# Patient Record
Sex: FEMALE | Race: BLACK OR AFRICAN AMERICAN | URBAN - METROPOLITAN AREA
[De-identification: names, ages, dates, MRNs, and addresses within clinical notes are randomized per-mention and may not be internally consistent; named-entity substitution may affect disease eponyms.]

---

## 2017-11-15 RX ORDER — ALBUTEROL SULFATE 90 UG/1
2 AEROSOL, METERED RESPIRATORY (INHALATION) EVERY 6 HOURS PRN
Qty: 1 INHALER | Refills: 2 | Status: SHIPPED | OUTPATIENT
Start: 2017-11-15 | End: 2022-11-02

## 2021-05-03 ENCOUNTER — OFFICE VISIT (OUTPATIENT)
Dept: PRIMARY CARE CLINIC | Age: 66
End: 2021-05-03
Payer: MEDICARE

## 2021-05-03 VITALS
HEIGHT: 64 IN | DIASTOLIC BLOOD PRESSURE: 63 MMHG | WEIGHT: 143 LBS | OXYGEN SATURATION: 100 % | SYSTOLIC BLOOD PRESSURE: 96 MMHG | BODY MASS INDEX: 24.41 KG/M2 | TEMPERATURE: 97.8 F | HEART RATE: 63 BPM

## 2021-05-03 DIAGNOSIS — J30.2 SEASONAL ALLERGIES: ICD-10-CM

## 2021-05-03 DIAGNOSIS — Z23 NEED FOR DIPHTHERIA-TETANUS-PERTUSSIS (TDAP) VACCINE: ICD-10-CM

## 2021-05-03 DIAGNOSIS — J45.20 MILD INTERMITTENT ASTHMA WITHOUT COMPLICATION: Primary | ICD-10-CM

## 2021-05-03 DIAGNOSIS — Z23 NEED FOR 23-POLYVALENT PNEUMOCOCCAL POLYSACCHARIDE VACCINE: ICD-10-CM

## 2021-05-03 DIAGNOSIS — G20 PARKINSON DISEASE (HCC): ICD-10-CM

## 2021-05-03 DIAGNOSIS — Z87.440 HISTORY OF RECURRENT UTIS: ICD-10-CM

## 2021-05-03 PROCEDURE — 4040F PNEUMOC VAC/ADMIN/RCVD: CPT | Performed by: FAMILY MEDICINE

## 2021-05-03 PROCEDURE — 90471 IMMUNIZATION ADMIN: CPT | Performed by: FAMILY MEDICINE

## 2021-05-03 PROCEDURE — G8420 CALC BMI NORM PARAMETERS: HCPCS | Performed by: FAMILY MEDICINE

## 2021-05-03 PROCEDURE — 99203 OFFICE O/P NEW LOW 30 MIN: CPT | Performed by: FAMILY MEDICINE

## 2021-05-03 PROCEDURE — G8399 PT W/DXA RESULTS DOCUMENT: HCPCS | Performed by: FAMILY MEDICINE

## 2021-05-03 PROCEDURE — 1090F PRES/ABSN URINE INCON ASSESS: CPT | Performed by: FAMILY MEDICINE

## 2021-05-03 PROCEDURE — 3017F COLORECTAL CA SCREEN DOC REV: CPT | Performed by: FAMILY MEDICINE

## 2021-05-03 PROCEDURE — 90715 TDAP VACCINE 7 YRS/> IM: CPT | Performed by: FAMILY MEDICINE

## 2021-05-03 PROCEDURE — 1123F ACP DISCUSS/DSCN MKR DOCD: CPT | Performed by: FAMILY MEDICINE

## 2021-05-03 PROCEDURE — G0009 ADMIN PNEUMOCOCCAL VACCINE: HCPCS | Performed by: FAMILY MEDICINE

## 2021-05-03 PROCEDURE — 1036F TOBACCO NON-USER: CPT | Performed by: FAMILY MEDICINE

## 2021-05-03 PROCEDURE — G8427 DOCREV CUR MEDS BY ELIG CLIN: HCPCS | Performed by: FAMILY MEDICINE

## 2021-05-03 PROCEDURE — 90732 PPSV23 VACC 2 YRS+ SUBQ/IM: CPT | Performed by: FAMILY MEDICINE

## 2021-05-03 RX ORDER — LORATADINE 10 MG/1
10 TABLET ORAL DAILY
COMMUNITY

## 2021-05-03 RX ORDER — ASCORBIC ACID 500 MG
500 TABLET ORAL DAILY
COMMUNITY

## 2021-05-03 SDOH — ECONOMIC STABILITY: FOOD INSECURITY: WITHIN THE PAST 12 MONTHS, YOU WORRIED THAT YOUR FOOD WOULD RUN OUT BEFORE YOU GOT MONEY TO BUY MORE.: NOT ASKED

## 2021-05-03 SDOH — ECONOMIC STABILITY: FOOD INSECURITY: WITHIN THE PAST 12 MONTHS, THE FOOD YOU BOUGHT JUST DIDN'T LAST AND YOU DIDN'T HAVE MONEY TO GET MORE.: NOT ASKED

## 2021-05-03 ASSESSMENT — ENCOUNTER SYMPTOMS
NAUSEA: 0
ABDOMINAL PAIN: 0
SORE THROAT: 0
COUGH: 0
SHORTNESS OF BREATH: 0

## 2021-05-03 ASSESSMENT — PATIENT HEALTH QUESTIONNAIRE - PHQ9
1. LITTLE INTEREST OR PLEASURE IN DOING THINGS: 0
SUM OF ALL RESPONSES TO PHQ9 QUESTIONS 1 & 2: 0

## 2021-05-03 NOTE — PROGRESS NOTES
[address] [facility] [address] PRIMARY CARE  [address] [place] [id number]  Dept: [phone]  Dept Fax: [phone]     5/3/2021      [facility]   1955     Chief Complaint   Patient presents with    New Patient     new to provider       HPI  Pt comes in today to establish care. She was previously followed by provider within our practice but it has been over 3 years since she has been seen here. She does not have any acute concerns or questions today. She is followed regularly by her neurologist twice yearly for a diagnosis of Parkinson's disease. She reports her parkinsonian symptoms remaining stable. She is compliant with her medications. Patient does reportedly have a history of recurrent UTIs, which her previous PCP in the practice was prescribing her ciprofloxacin to use as needed. PHQ Scores 5/3/2021   PHQ2 Score 0   PHQ9 Score 0     Interpretation of Total Score Depression Severity: 1-4 = Minimal depression, 5-9 = Mild depression, 10-14 = Moderate depression, 15-19 = Moderately severe depression, 20-27 = Severe depression     Prior to Visit Medications    Medication Sig Taking? Authorizing Provider   loratadine (CLARITIN) 10 MG tablet Take 10 mg by mouth daily Yes Historical Provider, MD   ascorbic acid (VITAMIN C) 500 MG tablet Take 500 mg by mouth daily Yes Historical Provider, MD   carbidopa-levodopa (SINEMET)  MG per tablet Take 1 tablet by mouth Yes Historical Provider, MD   albuterol sulfate HFA (VENTOLIN HFA) 108 (90 Base) MCG/ACT inhaler Inhale 2 puffs into the lungs every 6 hours as needed for Wheezing Yes Essie Romero MD   PRAMIPEXOLE DIHYDROCHLORIDE PO Take by mouth Dr Jose Melvin Yes Historical Provider, MD   gabapentin (NEURONTIN) 300 MG capsule Take 100 mg by mouth 3 times daily. Yes Historical Provider, MD   Multiple Vitamins-Minerals (MULTIVITAMIN PO) Take  by mouth.  Yes Historical Provider, MD   Calcium Carb-Cholecalciferol (CALCIUM 1000 + D PO) Take  by mouth. Yes Historical Provider, MD   ciprofloxacin (CIPRO) 250 MG tablet Take one tablet after intercourse to prevent bladder infection. Take 1 tablet twice a day for three days if an infection occurs. Christian Payan MD       Past Medical History:   Diagnosis Date    Asthma     Mild, seasonal    History of recurrent UTIs 5/4/2021    Parkinson disease [facility]) - diagnosed age 62 9/12/2016        Social History     Tobacco Use    Smoking status: Never Smoker    Smokeless tobacco: Never Used   Substance Use Topics    Alcohol use: Yes     Alcohol/week: 0.0 standard drinks     Comment: 1 drink per week    Drug use: No        Past Surgical History:   Procedure Laterality Date    COLONOSCOPY  11/06    Normal, repeat 11/11 due to (+) FH  [place]    COLONOSCOPY  7/15    Dr. Hazel Paniagua; one polyp, benign; repeat 7/20    FINGER SURGERY Bilateral     Trigger finger repair    LAPAROSCOPY      TONSILLECTOMY          Allergies   Allergen Reactions    Latex Itching    Penicillins Hives    Codeine Nausea And Vomiting        Family History   Problem Relation Age of Onset    Asthma Mother     Cancer Mother         Colon    Heart Attack Father     High Blood Pressure Father     Stroke Sister         Patient's past medical history, surgical history, family history, medications, and allergies  were all reviewed and updated as appropriate today. Review of Systems   Constitutional: Negative for fatigue, fever and unexpected weight change. HENT: Negative for congestion, ear pain and sore throat. Eyes: Negative for pain, itching and visual disturbance. Respiratory: Negative for cough, shortness of breath and wheezing. Cardiovascular: Negative for chest pain, palpitations and leg swelling. Gastrointestinal: Negative for abdominal pain, constipation, diarrhea, nausea and vomiting. Endocrine: Negative for cold intolerance, heat intolerance, polydipsia and polyuria.    Genitourinary: Negative for dysuria, frequency and hematuria. Musculoskeletal: Negative for arthralgias and joint swelling. Skin: Negative for rash. Neurological: Negative for dizziness and headaches. Hematological: Negative for adenopathy. BP 96/63   Pulse 63   Temp 97.8 °F (36.6 °C)   Ht 5' 4\" (1.626 m)   Wt 143 lb (64.9 kg)   SpO2 100%   BMI 24.55 kg/m²      Physical Exam  Vitals signs reviewed. Constitutional:       General: She is not in acute distress. Appearance: Normal appearance. She is well-developed and normal weight. HENT:      Head: Normocephalic and atraumatic. Right Ear: Tympanic membrane and ear canal normal. No drainage. No middle ear effusion. Tympanic membrane is not erythematous. Left Ear: Tympanic membrane and ear canal normal. No drainage. No middle ear effusion. Tympanic membrane is not erythematous. Nose: Nose normal. No rhinorrhea. Mouth/Throat:      Mouth: Mucous membranes are moist.      Pharynx: No oropharyngeal exudate or posterior oropharyngeal erythema. Eyes:      Extraocular Movements: Extraocular movements intact. Pupils: Pupils are equal, round, and reactive to light. Neck:      Musculoskeletal: Neck supple. Thyroid: No thyromegaly. Cardiovascular:      Rate and Rhythm: Normal rate and regular rhythm. Heart sounds: No murmur. Pulmonary:      Effort: Pulmonary effort is normal.      Breath sounds: Normal breath sounds. No wheezing. Abdominal:      General: Bowel sounds are normal.      Palpations: Abdomen is soft. There is no mass. Tenderness: There is no abdominal tenderness. Musculoskeletal: Normal range of motion. General: No swelling or deformity. Lymphadenopathy:      Cervical: No cervical adenopathy. Skin:     General: Skin is warm and dry. Findings: No rash. Neurological:      General: No focal deficit present. Mental Status: She is alert and oriented to person, place, and time.       Cranial Nerves: No cranial nerve deficit. Psychiatric:         Mood and Affect: Mood normal.         Behavior: Behavior is cooperative. Assessment:  Encounter Diagnoses   Name Primary?  Mild intermittent asthma without complication Yes    Seasonal allergies     Parkinson disease ([facility] - diagnosed age 62     Need for diphtheria-tetanus-pertussis (Tdap) vaccine     Need for 23-polyvalent pneumococcal polysaccharide vaccine     History of recurrent UTIs        Plan:  1. Mild intermittent asthma without complication  Chronic, stable. Continue albuterol use prn.    2. Seasonal allergies  Stable. 3. Parkinson disease ([facility] - diagnosed age 62  Reports q6 month follow up with Neuro - continue per their recs. 4. Need for diphtheria-tetanus-pertussis (Tdap) vaccine  Due today.  - Tdap (age 6y and older) IM (Boostrix)    5. Need for 23-polyvalent pneumococcal polysaccharide vaccine  Due today. - Pneumococcal polysaccharide vaccine 23-valent greater than or equal to 3yo subcutaneous/IM    6. History of recurrent UTIs    Chart review and discussion for wellness planning had with pt today. Return in about 6 months (around 11/3/2021) for [address] [place] [address]. Jose Luis Ferris, DO     Please note that this chart was generated using dragon dictation software. Although every effort was made to ensure the accuracy of this automated transcription, some errors in transcription may have occurred.

## 2021-05-04 PROBLEM — Z87.440 HISTORY OF RECURRENT UTIS: Status: ACTIVE | Noted: 2021-05-04

## 2021-05-04 ASSESSMENT — ENCOUNTER SYMPTOMS
VOMITING: 0
WHEEZING: 0
DIARRHEA: 0
EYE ITCHING: 0
CONSTIPATION: 0
EYE PAIN: 0

## 2021-06-07 RX ORDER — CIPROFLOXACIN 250 MG/1
TABLET, FILM COATED ORAL
Qty: 30 TABLET | Refills: 1 | Status: SHIPPED | OUTPATIENT
Start: 2021-06-07

## 2021-06-07 NOTE — TELEPHONE ENCOUNTER
LAST 5/3/21  NEXT 11/1/21      Susan FOLEY [facility] Support  Subject: Refill Request     QUESTIONS   Name of Medication? ciprofloxacin (CIPRO) 250 MG tablet   Patient-reported dosage and instructions? 250 mg / as needed with   infection   How many days do you have left? 0   [id number]   Pharmacy phone number (if available)? [phone]   Additional Information for Provider? URGENT OUT OF MEDS   ---------------------------------------------------------------------------   --------------   CALL BACK INFO   What is the best way for the office to contact you? OK to leave message on   voicemail   Preferred Call Back Phone Number?  [phone]

## 2021-10-25 ASSESSMENT — LIFESTYLE VARIABLES
HOW OFTEN DURING THE LAST YEAR HAVE YOU FOUND THAT YOU WERE NOT ABLE TO STOP DRINKING ONCE YOU HAD STARTED: NEVER
HOW OFTEN DURING THE LAST YEAR HAVE YOU NEEDED AN ALCOHOLIC DRINK FIRST THING IN THE MORNING TO GET YOURSELF GOING AFTER A NIGHT OF HEAVY DRINKING: 0
HOW OFTEN DURING THE LAST YEAR HAVE YOU FAILED TO DO WHAT WAS NORMALLY EXPECTED FROM YOU BECAUSE OF DRINKING: 0
HOW OFTEN DURING THE LAST YEAR HAVE YOU FAILED TO DO WHAT WAS NORMALLY EXPECTED FROM YOU BECAUSE OF DRINKING: NEVER
HOW OFTEN DURING THE LAST YEAR HAVE YOU BEEN UNABLE TO REMEMBER WHAT HAPPENED THE NIGHT BEFORE BECAUSE YOU HAD BEEN DRINKING: 0
HOW OFTEN DURING THE LAST YEAR HAVE YOU BEEN UNABLE TO REMEMBER WHAT HAPPENED THE NIGHT BEFORE BECAUSE YOU HAD BEEN DRINKING: NEVER
HOW OFTEN DO YOU HAVE SIX OR MORE DRINKS ON ONE OCCASION: NEVER
HAVE YOU OR SOMEONE ELSE BEEN INJURED AS A RESULT OF YOUR DRINKING: NO
HOW OFTEN DURING THE LAST YEAR HAVE YOU NEEDED AN ALCOHOLIC DRINK FIRST THING IN THE MORNING TO GET YOURSELF GOING AFTER A NIGHT OF HEAVY DRINKING: NEVER
HOW MANY STANDARD DRINKS CONTAINING ALCOHOL DO YOU HAVE ON A TYPICAL DAY: 0
HOW OFTEN DO YOU HAVE A DRINK CONTAINING ALCOHOL: 1
AUDIT-C TOTAL SCORE: 1
HAVE YOU OR SOMEONE ELSE BEEN INJURED AS A RESULT OF YOUR DRINKING: 0
HOW OFTEN DURING THE LAST YEAR HAVE YOU FOUND THAT YOU WERE NOT ABLE TO STOP DRINKING ONCE YOU HAD STARTED: 0
HAS A RELATIVE, FRIEND, DOCTOR, OR ANOTHER HEALTH PROFESSIONAL EXPRESSED CONCERN ABOUT YOUR DRINKING OR SUGGESTED YOU CUT DOWN: NO
AUDIT TOTAL SCORE: 1
HOW MANY STANDARD DRINKS CONTAINING ALCOHOL DO YOU HAVE ON A TYPICAL DAY: ONE OR TWO
HOW OFTEN DO YOU HAVE SIX OR MORE DRINKS ON ONE OCCASION: 0
HAS A RELATIVE, FRIEND, DOCTOR, OR ANOTHER HEALTH PROFESSIONAL EXPRESSED CONCERN ABOUT YOUR DRINKING OR SUGGESTED YOU CUT DOWN: 0
AUDIT-C TOTAL SCORE: 0
HOW OFTEN DURING THE LAST YEAR HAVE YOU HAD A FEELING OF GUILT OR REMORSE AFTER DRINKING: NEVER
AUDIT TOTAL SCORE: 0
HOW OFTEN DURING THE LAST YEAR HAVE YOU HAD A FEELING OF GUILT OR REMORSE AFTER DRINKING: 0
HOW OFTEN DO YOU HAVE A DRINK CONTAINING ALCOHOL: MONTHLY OR LESS

## 2021-10-25 ASSESSMENT — PATIENT HEALTH QUESTIONNAIRE - PHQ9
2. FEELING DOWN, DEPRESSED OR HOPELESS: 0
SUM OF ALL RESPONSES TO PHQ9 QUESTIONS 1 & 2: 0
SUM OF ALL RESPONSES TO PHQ QUESTIONS 1-9: 0
SUM OF ALL RESPONSES TO PHQ QUESTIONS 1-9: 0
1. LITTLE INTEREST OR PLEASURE IN DOING THINGS: 0
SUM OF ALL RESPONSES TO PHQ QUESTIONS 1-9: 0

## 2021-11-01 ENCOUNTER — OFFICE VISIT (OUTPATIENT)
Dept: PRIMARY CARE CLINIC | Age: 66
End: 2021-11-01
Payer: MEDICARE

## 2021-11-01 VITALS
WEIGHT: 137 LBS | SYSTOLIC BLOOD PRESSURE: 138 MMHG | HEART RATE: 52 BPM | HEIGHT: 64 IN | TEMPERATURE: 97.2 F | BODY MASS INDEX: 23.39 KG/M2 | DIASTOLIC BLOOD PRESSURE: 77 MMHG | OXYGEN SATURATION: 100 %

## 2021-11-01 DIAGNOSIS — M85.89 OSTEOPENIA OF MULTIPLE SITES: ICD-10-CM

## 2021-11-01 DIAGNOSIS — Z23 FLU VACCINE NEED: ICD-10-CM

## 2021-11-01 DIAGNOSIS — M17.12 PRIMARY OSTEOARTHRITIS OF LEFT KNEE: ICD-10-CM

## 2021-11-01 DIAGNOSIS — G20 PARKINSON DISEASE (HCC): ICD-10-CM

## 2021-11-01 DIAGNOSIS — Z78.0 POST-MENOPAUSAL: ICD-10-CM

## 2021-11-01 DIAGNOSIS — Z80.0 FAMILY HISTORY OF COLON CANCER IN MOTHER: ICD-10-CM

## 2021-11-01 DIAGNOSIS — Z00.00 ROUTINE GENERAL MEDICAL EXAMINATION AT A HEALTH CARE FACILITY: ICD-10-CM

## 2021-11-01 DIAGNOSIS — Z12.4 SCREENING FOR CERVICAL CANCER: ICD-10-CM

## 2021-11-01 DIAGNOSIS — Z00.00 ROUTINE GENERAL MEDICAL EXAMINATION AT A HEALTH CARE FACILITY: Primary | ICD-10-CM

## 2021-11-01 DIAGNOSIS — Z87.440 HISTORY OF RECURRENT UTIS: ICD-10-CM

## 2021-11-01 DIAGNOSIS — J45.20 MILD INTERMITTENT ASTHMA WITHOUT COMPLICATION: ICD-10-CM

## 2021-11-01 LAB
A/G RATIO: 2.1 (ref 1.1–2.2)
ALBUMIN SERPL-MCNC: 4.7 G/DL (ref 3.4–5)
ALP BLD-CCNC: 94 U/L (ref 40–129)
ALT SERPL-CCNC: 6 U/L (ref 10–40)
ANION GAP SERPL CALCULATED.3IONS-SCNC: 12 MMOL/L (ref 3–16)
AST SERPL-CCNC: 23 U/L (ref 15–37)
BASOPHILS ABSOLUTE: 0 K/UL (ref 0–0.2)
BASOPHILS RELATIVE PERCENT: 1.1 %
BILIRUB SERPL-MCNC: 0.5 MG/DL (ref 0–1)
BUN BLDV-MCNC: 16 MG/DL (ref 7–20)
CALCIUM SERPL-MCNC: 9.7 MG/DL (ref 8.3–10.6)
CHLORIDE BLD-SCNC: 103 MMOL/L (ref 99–110)
CHOLESTEROL, FASTING: 191 MG/DL (ref 0–199)
CO2: 25 MMOL/L (ref 21–32)
CREAT SERPL-MCNC: 0.6 MG/DL (ref 0.6–1.2)
EOSINOPHILS ABSOLUTE: 0.1 K/UL (ref 0–0.6)
EOSINOPHILS RELATIVE PERCENT: 4.5 %
GFR AFRICAN AMERICAN: >60
GFR NON-AFRICAN AMERICAN: >60
GLUCOSE BLD-MCNC: 83 MG/DL (ref 70–99)
HCT VFR BLD CALC: 40.4 % (ref 36–48)
HDLC SERPL-MCNC: 74 MG/DL (ref 40–60)
HEMOGLOBIN: 13.1 G/DL (ref 12–16)
LDL CHOLESTEROL CALCULATED: 110 MG/DL
LYMPHOCYTES ABSOLUTE: 1.4 K/UL (ref 1–5.1)
LYMPHOCYTES RELATIVE PERCENT: 45.2 %
MCH RBC QN AUTO: 30.9 PG (ref 26–34)
MCHC RBC AUTO-ENTMCNC: 32.6 G/DL (ref 31–36)
MCV RBC AUTO: 94.9 FL (ref 80–100)
MONOCYTES ABSOLUTE: 0.2 K/UL (ref 0–1.3)
MONOCYTES RELATIVE PERCENT: 6.6 %
NEUTROPHILS ABSOLUTE: 1.3 K/UL (ref 1.7–7.7)
NEUTROPHILS RELATIVE PERCENT: 42.6 %
PDW BLD-RTO: 13.5 % (ref 12.4–15.4)
PLATELET # BLD: 172 K/UL (ref 135–450)
PMV BLD AUTO: 8.1 FL (ref 5–10.5)
POTASSIUM SERPL-SCNC: 4.3 MMOL/L (ref 3.5–5.1)
RBC # BLD: 4.25 M/UL (ref 4–5.2)
SODIUM BLD-SCNC: 140 MMOL/L (ref 136–145)
TOTAL PROTEIN: 6.9 G/DL (ref 6.4–8.2)
TRIGLYCERIDE, FASTING: 37 MG/DL (ref 0–150)
VLDLC SERPL CALC-MCNC: 7 MG/DL
WBC # BLD: 3 K/UL (ref 4–11)

## 2021-11-01 PROCEDURE — G8484 FLU IMMUNIZE NO ADMIN: HCPCS | Performed by: FAMILY MEDICINE

## 2021-11-01 PROCEDURE — G0438 PPPS, INITIAL VISIT: HCPCS | Performed by: FAMILY MEDICINE

## 2021-11-01 PROCEDURE — 1123F ACP DISCUSS/DSCN MKR DOCD: CPT | Performed by: FAMILY MEDICINE

## 2021-11-01 PROCEDURE — 3017F COLORECTAL CA SCREEN DOC REV: CPT | Performed by: FAMILY MEDICINE

## 2021-11-01 PROCEDURE — G0008 ADMIN INFLUENZA VIRUS VAC: HCPCS | Performed by: FAMILY MEDICINE

## 2021-11-01 PROCEDURE — 90694 VACC AIIV4 NO PRSRV 0.5ML IM: CPT | Performed by: FAMILY MEDICINE

## 2021-11-01 PROCEDURE — 4040F PNEUMOC VAC/ADMIN/RCVD: CPT | Performed by: FAMILY MEDICINE

## 2021-11-01 NOTE — PROGRESS NOTES
Vaccine Information Sheet, \"Influenza - Inactivated\"  given to [employer], or parent/legal guardian of  [employer] and verbalized understanding. Patient responses:    Have you ever had a reaction to a flu vaccine? No  Do you have any current illness? No  Have you ever had Guillian Charlotte Syndrome? No  Do you have a serious allergy to any of the follow: Neomycin, Polymyxin, Thimerosal, eggs or egg products? No    Flu vaccine given per order. Please see immunization tab. Risks and benefits explained. Current VIS given.

## 2021-11-01 NOTE — PROGRESS NOTES
Medicare Annual Wellness Visit  Name: Deborah Raymond Date: 2021   MRN: <[id number]> Sex: Female   Age: 73 y.o. Ethnicity: Non- / Non    : 1955 Race: [facility] / Bobby Griffith MENA Hernandez is here for Medicare AWV    Screenings for behavioral, psychosocial and functional/safety risks, and cognitive dysfunction are all negative except as indicated below. These results, as well as other patient data from the [address] form, are documented in Flowsheets linked to this Encounter. Has been taking Meloxicam daily prn for 3-4 months. Started by [facility] for arthritis - L knee. Given injection at that time as well. Mild improvements. No acute concerns. Overall, doing well. Allergies   Allergen Reactions    Latex Itching    Penicillins Hives    Codeine Nausea And Vomiting         Prior to Visit Medications    Medication Sig Taking? Authorizing Provider   ciprofloxacin (CIPRO) 250 MG tablet Take one tablet after intercourse to prevent bladder infection. Take 1 tablet twice a day for three days if an infection occurs. Yes Hussein Munroe,    loratadine (CLARITIN) 10 MG tablet Take 10 mg by mouth daily Yes Historical Provider, MD   ascorbic acid (VITAMIN C) 500 MG tablet Take 500 mg by mouth daily Yes Historical Provider, MD   carbidopa-levodopa (SINEMET)  MG per tablet Take 1 tablet by mouth Yes Historical Provider, MD   albuterol sulfate HFA (VENTOLIN HFA) 108 (90 Base) MCG/ACT inhaler Inhale 2 puffs into the lungs every 6 hours as needed for Wheezing Yes Bruno Mayer MD   PRAMIPEXOLE DIHYDROCHLORIDE PO Take by mouth Dr Mary Valladraes Yes Historical Provider, MD   gabapentin (NEURONTIN) 300 MG capsule Take 100 mg by mouth 3 times daily. Yes Historical Provider, MD   Multiple Vitamins-Minerals (MULTIVITAMIN PO) Take  by mouth. Yes Historical Provider, MD   Calcium Carb-Cholecalciferol (CALCIUM 1000 + D PO) Take  by mouth.  Yes Historical Provider, MD         Past Medical History:   Diagnosis Date    Asthma     Mild, seasonal    Degenerative arthritis of left knee 11/1/2021    History of recurrent UTIs 5/4/2021    Parkinson disease [facility]) - diagnosed age 62 9/12/2016       Past Surgical History:   Procedure Laterality Date    COLONOSCOPY  11/06    Normal, repeat 11/11 due to (+) FH  [place]    COLONOSCOPY  7/15    Dr. Ioana Fernandez; one polyp, benign; repeat 7/20    FINGER SURGERY Bilateral     Trigger finger repair    LAPAROSCOPY      TONSILLECTOMY           Family History   Problem Relation Age of Onset    Asthma Mother     Cancer Mother         Colon    Heart Attack Father     High Blood Pressure Father     Stroke Sister        CareTeam (Including outside providers/suppliers regularly involved in providing care):   Patient Care Team:  Amrita Ford DO as PCP - General (Family Medicine)  Amrita Ford DO as PCP - [facility] Empaneled Provider  Francesco Angeles (Neurology)  Jayden Menon MD as Surgeon (Gastroenterology)    Wt Readings from Last 3 Encounters:   11/01/21 137 lb (62.1 kg)   05/03/21 143 lb (64.9 kg)   09/12/16 143 lb 12.8 oz (65.2 kg)     Vitals:    11/01/21 0925   BP: 138/77   Pulse: 52   Temp: 97.2 °F (36.2 °C)   SpO2: 100%   Weight: 137 lb (62.1 kg)   Height: 5' 4\" (1.626 m)     Body mass index is 23.52 kg/m². Based upon direct observation of the patient, evaluation of cognition reveals recent and remote memory intact.     General Appearance: alert and oriented to person, place and time, well developed and well- nourished, in no acute distress  Skin: warm and dry, no rash or erythema  Head: normocephalic and atraumatic  Eyes: pupils equal, round, and reactive to light, extraocular eye movements intact, conjunctivae normal  ENT: tympanic membrane, external ear and ear canal normal bilaterally, nose without deformity, nasal mucosa and turbinates normal without polyps  Neck: supple and non-tender without mass, no 11/01/2021    Pneumococcal Polysaccharide ([id number]) 05/03/2021    Tdap (Boostrix, Adacel) 11/15/2010, 05/03/2021        Health Maintenance   Topic Date Due    Annual Wellness Visit (AWV)  Never done    COVID-19 Vaccine (3 - Pfizer booster) 09/17/2021    Breast cancer screen  02/05/2023    Colon cancer screen colonoscopy  07/17/2025    Lipid screen  11/01/2026    Cervical cancer screen  11/01/2026    DTaP/Tdap/Td vaccine (3 - Td or Tdap) 05/03/2031    DEXA (modify frequency per FRAX score)  Completed    Flu vaccine  Completed    Pneumococcal 65+ years Vaccine  Completed    Hepatitis C screen  Completed    HIV screen  Completed    Hepatitis A vaccine  Aged Out    Hepatitis B vaccine  Aged Out    Hib vaccine  Aged Out    Meningococcal (ACWY) vaccine  Aged Out     Recommendations for [place] Due: see orders and patient instructions/AVS.  . Recommended screening schedule for the next 5-10 years is provided to the patient in written form: see Patient Instructions/AVS.    Jayson Watts was seen today for medicare awv. Diagnoses and all orders for this visit:    Routine general medical examination at a health care facility  General wellness exam. Reviewed chart for past hx and updated today. Counseled on age appropriate health guidance and discussed screening recommendations. Vaccinations reviewed and discussed. All questions answered  -     CBC Auto Differential; Future  -     Comprehensive Metabolic Panel, Fasting; Future  -     Lipid, Fasting; Future    Post-menopausal  Postmenopausal female who is due for routine DEXA screening. Discussed reasoning for screening and why she is indicated. All questions answered regarding this. Patient would like to move forward with order now. I have counseled patient to maintain good physical activity and use over-the-counter vitamin D/calcium supplements.   -     DEXA BONE DENSITY AXIAL SKELETON; Future    Osteopenia of multiple sites    Screening for cervical cancer  -     PAP SMEAR  -     Human papillomavirus (HPV) DNA probe thin prep high risk    Flu vaccine need  -     INFLUENZA, QUADV, ADJUVANTED, 72 YRS =, IM, PF, PREFILL SYR, 0.5ML (FLUAD)    Family history of colon cancer in mother    Parkinson disease [facility]) - diagnosed age 62    History of recurrent UTIs    Mild intermittent asthma without complication    Primary osteoarthritis of left knee              Alan Marks, DO    Please note that this chart was generated using dragon dictation software. Although every effort was made to ensure the accuracy of this automated transcription, some errors in transcription may have occurred.

## 2021-11-01 NOTE — PATIENT INSTRUCTIONS
Personalized Preventive Plan for [place] [facility] - 11/1/2021  Medicare offers a range of preventive health benefits. Some of the tests and screenings are paid in full while other may be subject to a deductible, co-insurance, and/or copay. Some of these benefits include a comprehensive review of your medical history including lifestyle, illnesses that may run in your family, and various assessments and screenings as appropriate. After reviewing your medical record and screening and assessments performed today your provider may have ordered immunizations, labs, imaging, and/or referrals for you. A list of these orders (if applicable) as well as your Preventive Care list are included within your After Visit Summary for your review. Other Preventive Recommendations:    · A preventive eye exam performed by an eye specialist is recommended every 1-2 years to screen for glaucoma; cataracts, macular degeneration, and other eye disorders. · A preventive dental visit is recommended every 6 months. · Try to get at least 150 minutes of exercise per week or 10,000 steps per day on a pedometer . · Order or download the FREE \"Exercise & Physical Activity: Your Everyday Guide\" from [employer]. Call 5-510.183.6076 or search [employer] online. · You need 5420-0770 mg of calcium and 6201-9382 IU of vitamin D per day. It is possible to meet your calcium requirement with diet alone, but a vitamin D supplement is usually necessary to meet this goal.  · When exposed to the sun, use a sunscreen that protects against both UVA and UVB radiation with an SPF of 30 or greater. Reapply every 2 to 3 hours or after sweating, drying off with a towel, or swimming. · Always wear a seat belt when traveling in a car. Always wear a helmet when riding a bicycle or motorcycle.

## 2021-11-03 LAB
HPV COMMENT: NORMAL
HPV TYPE 16: NOT DETECTED
HPV TYPE 18: NOT DETECTED
HPVOH (OTHER TYPES): NOT DETECTED

## 2022-09-26 ENCOUNTER — OFFICE VISIT (OUTPATIENT)
Dept: PRIMARY CARE CLINIC | Age: 67
End: 2022-09-26
Payer: MEDICARE

## 2022-09-26 VITALS
BODY MASS INDEX: 21.51 KG/M2 | SYSTOLIC BLOOD PRESSURE: 113 MMHG | HEART RATE: 58 BPM | OXYGEN SATURATION: 99 % | DIASTOLIC BLOOD PRESSURE: 54 MMHG | TEMPERATURE: 97.4 F | HEIGHT: 64 IN | WEIGHT: 126 LBS

## 2022-09-26 DIAGNOSIS — M79.18 LEFT BUTTOCK PAIN: ICD-10-CM

## 2022-09-26 DIAGNOSIS — Z23 FLU VACCINE NEED: ICD-10-CM

## 2022-09-26 DIAGNOSIS — G44.209 ACUTE NON INTRACTABLE TENSION-TYPE HEADACHE: Primary | ICD-10-CM

## 2022-09-26 DIAGNOSIS — S13.4XXA WHIPLASH INJURY TO NECK, INITIAL ENCOUNTER: ICD-10-CM
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
## 2022-09-26 PROCEDURE — 1036F TOBACCO NON-USER: CPT | Performed by: FAMILY MEDICINE

## 2022-09-26 PROCEDURE — G8420 CALC BMI NORM PARAMETERS: HCPCS | Performed by: FAMILY MEDICINE

## 2022-09-26 PROCEDURE — 1090F PRES/ABSN URINE INCON ASSESS: CPT | Performed by: FAMILY MEDICINE

## 2022-09-26 PROCEDURE — 99213 OFFICE O/P EST LOW 20 MIN: CPT | Performed by: FAMILY MEDICINE

## 2022-09-26 PROCEDURE — 90694 VACC AIIV4 NO PRSRV 0.5ML IM: CPT | Performed by: FAMILY MEDICINE

## 2022-09-26 PROCEDURE — G0008 ADMIN INFLUENZA VIRUS VAC: HCPCS | Performed by: FAMILY MEDICINE

## 2022-09-26 PROCEDURE — G8427 DOCREV CUR MEDS BY ELIG CLIN: HCPCS | Performed by: FAMILY MEDICINE

## 2022-09-26 PROCEDURE — 3017F COLORECTAL CA SCREEN DOC REV: CPT | Performed by: FAMILY MEDICINE

## 2022-09-26 PROCEDURE — 1123F ACP DISCUSS/DSCN MKR DOCD: CPT | Performed by: FAMILY MEDICINE

## 2022-09-26 PROCEDURE — G8399 PT W/DXA RESULTS DOCUMENT: HCPCS | Performed by: FAMILY MEDICINE

## 2022-09-26 RX ORDER — MELOXICAM 15 MG/1
15 TABLET ORAL DAILY
Qty: 14 TABLET | Refills: 0 | Status: SHIPPED | OUTPATIENT
Start: 2022-09-26 | End: 2022-11-02 | Stop reason: ALTCHOICE

## 2022-09-26 SDOH — ECONOMIC STABILITY: FOOD INSECURITY: WITHIN THE PAST 12 MONTHS, YOU WORRIED THAT YOUR FOOD WOULD RUN OUT BEFORE YOU GOT MONEY TO BUY MORE.: NEVER TRUE

## 2022-09-26 SDOH — ECONOMIC STABILITY: FOOD INSECURITY: WITHIN THE PAST 12 MONTHS, THE FOOD YOU BOUGHT JUST DIDN'T LAST AND YOU DIDN'T HAVE MONEY TO GET MORE.: NEVER TRUE

## 2022-09-26 ASSESSMENT — PATIENT HEALTH QUESTIONNAIRE - PHQ9
SUM OF ALL RESPONSES TO PHQ9 QUESTIONS 1 & 2: 0
SUM OF ALL RESPONSES TO PHQ QUESTIONS 1-9: 0
1. LITTLE INTEREST OR PLEASURE IN DOING THINGS: 0
SUM OF ALL RESPONSES TO PHQ QUESTIONS 1-9: 0
SUM OF ALL RESPONSES TO PHQ QUESTIONS 1-9: 0
2. FEELING DOWN, DEPRESSED OR HOPELESS: 0
SUM OF ALL RESPONSES TO PHQ QUESTIONS 1-9: 0

## 2022-09-26 ASSESSMENT — SOCIAL DETERMINANTS OF HEALTH (SDOH): HOW HARD IS IT FOR YOU TO PAY FOR THE VERY BASICS LIKE FOOD, HOUSING, MEDICAL CARE, AND HEATING?: NOT HARD AT ALL
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
## 2022-09-27 ASSESSMENT — ENCOUNTER SYMPTOMS
SHORTNESS OF BREATH: 0
CONSTIPATION: 0
WHEEZING: 0
ABDOMINAL PAIN: 0
DIARRHEA: 0
EYE ITCHING: 0
SORE THROAT: 0
VOMITING: 0
NAUSEA: 0
BACK PAIN: 1
COUGH: 0
EYE PAIN: 0

## 2022-11-01 SDOH — HEALTH STABILITY: PHYSICAL HEALTH: ON AVERAGE, HOW MANY MINUTES DO YOU ENGAGE IN EXERCISE AT THIS LEVEL?: 60 MIN

## 2022-11-01 SDOH — HEALTH STABILITY: PHYSICAL HEALTH: ON AVERAGE, HOW MANY DAYS PER WEEK DO YOU ENGAGE IN MODERATE TO STRENUOUS EXERCISE (LIKE A BRISK WALK)?: 6 DAYS

## 2022-11-01 ASSESSMENT — PATIENT HEALTH QUESTIONNAIRE - PHQ9
SUM OF ALL RESPONSES TO PHQ9 QUESTIONS 1 & 2: 1
SUM OF ALL RESPONSES TO PHQ QUESTIONS 1-9: 1
2. FEELING DOWN, DEPRESSED OR HOPELESS: 1
SUM OF ALL RESPONSES TO PHQ QUESTIONS 1-9: 1
1. LITTLE INTEREST OR PLEASURE IN DOING THINGS: 0

## 2022-11-01 ASSESSMENT — LIFESTYLE VARIABLES
HOW OFTEN DO YOU HAVE A DRINK CONTAINING ALCOHOL: MONTHLY OR LESS
HOW MANY STANDARD DRINKS CONTAINING ALCOHOL DO YOU HAVE ON A TYPICAL DAY: 1 OR 2
HOW OFTEN DO YOU HAVE SIX OR MORE DRINKS ON ONE OCCASION: 1
HOW OFTEN DO YOU HAVE A DRINK CONTAINING ALCOHOL: 2
HOW MANY STANDARD DRINKS CONTAINING ALCOHOL DO YOU HAVE ON A TYPICAL DAY: 1

## 2022-11-02 ENCOUNTER — OFFICE VISIT (OUTPATIENT)
Dept: PRIMARY CARE CLINIC | Age: 67
End: 2022-11-02
Payer: MEDICARE

## 2022-11-02 VITALS
HEIGHT: 64 IN | BODY MASS INDEX: 21.31 KG/M2 | TEMPERATURE: 97 F | HEART RATE: 48 BPM | SYSTOLIC BLOOD PRESSURE: 130 MMHG | WEIGHT: 124.8 LBS | DIASTOLIC BLOOD PRESSURE: 65 MMHG

## 2022-11-02 DIAGNOSIS — M85.89 OSTEOPENIA OF MULTIPLE SITES: ICD-10-CM

## 2022-11-02 DIAGNOSIS — Z00.00 MEDICARE ANNUAL WELLNESS VISIT, SUBSEQUENT: Primary | ICD-10-CM

## 2022-11-02 DIAGNOSIS — Z80.0 FAMILY HISTORY OF COLON CANCER IN MOTHER: ICD-10-CM

## 2022-11-02 DIAGNOSIS — G44.209 ACUTE NON INTRACTABLE TENSION-TYPE HEADACHE: ICD-10-CM

## 2022-11-02 DIAGNOSIS — Z78.0 POST-MENOPAUSE: ICD-10-CM

## 2022-11-02 DIAGNOSIS — Z00.00 MEDICARE ANNUAL WELLNESS VISIT, SUBSEQUENT: ICD-10-CM

## 2022-11-02 DIAGNOSIS — R00.1 BRADYCARDIA: ICD-10-CM

## 2022-11-02 DIAGNOSIS — G20 PARKINSON DISEASE (HCC): ICD-10-CM

## 2022-11-02 PROCEDURE — G8427 DOCREV CUR MEDS BY ELIG CLIN: HCPCS | Performed by: FAMILY MEDICINE

## 2022-11-02 PROCEDURE — G0439 PPPS, SUBSEQ VISIT: HCPCS | Performed by: FAMILY MEDICINE

## 2022-11-02 PROCEDURE — 93000 ELECTROCARDIOGRAM COMPLETE: CPT | Performed by: FAMILY MEDICINE

## 2022-11-02 PROCEDURE — 1123F ACP DISCUSS/DSCN MKR DOCD: CPT | Performed by: FAMILY MEDICINE

## 2022-11-02 PROCEDURE — 1090F PRES/ABSN URINE INCON ASSESS: CPT | Performed by: FAMILY MEDICINE

## 2022-11-02 PROCEDURE — 99213 OFFICE O/P EST LOW 20 MIN: CPT | Performed by: FAMILY MEDICINE

## 2022-11-02 PROCEDURE — 3017F COLORECTAL CA SCREEN DOC REV: CPT | Performed by: FAMILY MEDICINE

## 2022-11-02 PROCEDURE — G8399 PT W/DXA RESULTS DOCUMENT: HCPCS | Performed by: FAMILY MEDICINE

## 2022-11-02 PROCEDURE — G8420 CALC BMI NORM PARAMETERS: HCPCS | Performed by: FAMILY MEDICINE

## 2022-11-02 PROCEDURE — G8484 FLU IMMUNIZE NO ADMIN: HCPCS | Performed by: FAMILY MEDICINE

## 2022-11-02 PROCEDURE — 1036F TOBACCO NON-USER: CPT | Performed by: FAMILY MEDICINE

## 2022-11-02 NOTE — PATIENT INSTRUCTIONS
Personalized Preventive Plan for Mary Wilson [id number] - 11/2/2022  Medicare offers a range of preventive health benefits. Some of the tests and screenings are paid in full while other may be subject to a deductible, co-insurance, and/or copay. Some of these benefits include a comprehensive review of your medical history including lifestyle, illnesses that may run in your family, and various assessments and screenings as appropriate. After reviewing your medical record and screening and assessments performed today your provider may have ordered immunizations, labs, imaging, and/or referrals for you. A list of these orders (if applicable) as well as your Preventive Care list are included within your After Visit Summary for your review. Other Preventive Recommendations:    A preventive eye exam performed by an eye specialist is recommended every 1-2 years to screen for glaucoma; cataracts, macular degeneration, and other eye disorders. A preventive dental visit is recommended every 6 months. Try to get at least 150 minutes of exercise per week or 10,000 steps per day on a pedometer . Order or download the FREE \"Exercise & Physical Activity: Your Everyday Guide\" from [employer]. Call 9-749.628.5416 or search [employer] online. You need 4481-5451 mg of calcium and 6553-7628 IU of vitamin D per day. It is possible to meet your calcium requirement with diet alone, but a vitamin D supplement is usually necessary to meet this goal.  When exposed to the sun, use a sunscreen that protects against both UVA and UVB radiation with an SPF of 30 or greater. Reapply every 2 to 3 hours or after sweating, drying off with a towel, or swimming. Always wear a seat belt when traveling in a car. Always wear a helmet when riding a bicycle or motorcycle.

## 2022-11-02 NOTE — PROGRESS NOTES
[facility]     Outpatient Cardiology         Patient Name:  Briseida BELLE  Requesting Physician: Janene Amaya DO  Primary Care Physician: Janene Amaya DO    Reason for Consultation/Chief Complaint:   Chief Complaint   Patient presents with    New Patient         HPI:     78year old female with  Parkinson's Disease referred by PCP for evaluation of bradycardia. Patient denies chest pain, shortness of breath, orthopnea, PND, syncope and presyncope. She is fairly active. She states that she occasionally feels bad with taking Parkinson's medications. Reviewing her medical records she has been chronically bradycardic with heart rates in the 50s. EKG today reveals sinus bradycardia with heart rate in the 40s. Histories:     Past Medical History:   has a past medical history of Asthma, Bradycardia, Degenerative arthritis of left knee, History of recurrent UTIs, Parkinson disease ([facility] [address] - diagnosed age 62, and Urinary incontinence. Surgical History:   has a past surgical history that includes laparoscopy; Tonsillectomy; Colonoscopy (11/06); Colonoscopy (7/15); and Finger surgery (Bilateral). Social History:   reports that she has never smoked. She has never used smokeless tobacco. She reports current alcohol use. She reports that she does not use drugs. Family History:  No evidence for sudden cardiac death or premature CAD    Medications:     Home Medications:  Were reviewed and are listed in nursing record. and/or listed below    Prior to Admission medications    Medication Sig Start Date End Date Taking? Authorizing Provider   ciprofloxacin (CIPRO) 250 MG tablet Take one tablet after intercourse to prevent bladder infection. Take 1 tablet twice a day for three days if an infection occurs.  6/7/21  Yes Edenilson Munroe DO   loratadine (CLARITIN) 10 MG tablet Take 10 mg by mouth daily   Yes Historical Provider, MD   ascorbic acid (VITAMIN C) 500 MG tablet Take 500 mg by mouth daily   Yes Historical Provider, MD   carbidopa-levodopa (SINEMET)  MG per tablet Take 1 tablet by mouth 12/17/20  Yes Historical Provider, MD   PRAMIPEXOLE DIHYDROCHLORIDE PO Take by mouth Dr Prosper Balderas   Yes Historical Provider, MD   gabapentin (NEURONTIN) 300 MG capsule Take 100 mg by mouth 3 times daily. Yes Historical Provider, MD   Multiple Vitamins-Minerals (MULTIVITAMIN PO) Take  by mouth. Yes Historical Provider, MD   Calcium Carb-Cholecalciferol (CALCIUM 1000 + D PO) Take  by mouth. Yes Historical Provider, MD   albuterol sulfate HFA (VENTOLIN HFA) 108 (90 Base) MCG/ACT inhaler Inhale 2 puffs into the lungs every 6 hours as needed for Wheezing 11/15/17 11/2/22  Corky Severin, MD        Allergy:     Latex, Penicillins, and Codeine     Review of Systems:     Review of Systems   Constitutional:  Negative for chills and fever. Respiratory:          See HPI   Cardiovascular:         See HPI. Gastrointestinal:  Negative for abdominal pain and vomiting. Endocrine: Negative. Genitourinary: Negative. Skin: Negative. Psychiatric/Behavioral: Negative. All other systems reviewed and are negative. Physical Examination:     Vitals:    11/03/22 1000   BP: 128/76   Pulse: 58   SpO2: 99%   Weight: 127 lb 6.4 oz (57.8 kg)   Height: 5' 4\" (1.626 m)       Wt Readings from Last 3 Encounters:   11/03/22 127 lb 6.4 oz (57.8 kg)   11/02/22 124 lb 12.8 oz (56.6 kg)   09/26/22 126 lb (57.2 kg)       Physical Exam  Constitutional:       Appearance: Normal appearance. HENT:      Head: Normocephalic and atraumatic. Nose: Nose normal.   Eyes:      Conjunctiva/sclera: Conjunctivae normal.   Cardiovascular:      Rate and Rhythm: Regular rhythm. Bradycardia present. Heart sounds: Normal heart sounds. Pulmonary:      Effort: Pulmonary effort is normal.      Breath sounds: Normal breath sounds. Abdominal:      Palpations: Abdomen is soft. Musculoskeletal:      Cervical back: Neck supple. Skin:     General: Skin is warm and dry. Neurological:      General: No focal deficit present. Mental Status: She is alert.          Labs:     Lab Results   Component Value Date    WBC 3.0 (L) 11/01/2021    HGB 13.1 11/01/2021    HCT 40.4 11/01/2021    MCV 94.9 11/01/2021     11/01/2021     Lab Results   Component Value Date     11/01/2021    K 4.3 11/01/2021     11/01/2021    CO2 25 11/01/2021    BUN 16 11/01/2021    CREATININE 0.6 11/01/2021    GLUCOSE 83 11/01/2021    CALCIUM 9.7 11/01/2021    PROT 6.9 11/01/2021    LABALBU 4.7 11/01/2021    BILITOT 0.5 11/01/2021    ALKPHOS 94 11/01/2021    AST 23 11/01/2021    ALT 6 (L) 11/01/2021    LABGLOM >60 11/01/2021    GFRAA >60 11/01/2021    AGRATIO 2.1 11/01/2021         Lab Results   Component Value Date    CHOL 178 08/29/2016    CHOL 184 08/24/2015    CHOL 161 04/14/2014     Lab Results   Component Value Date    TRIG 55 08/29/2016    TRIG 35 08/24/2015    TRIG 32 04/14/2014     Lab Results   Component Value Date    HDL 74 (H) 11/01/2021    HDL 65 (H) 08/29/2016    HDL 66 (H) 08/24/2015     Lab Results   Component Value Date    LDLCALC 110 (H) 11/01/2021    LDLCALC 102 (H) 08/29/2016    LDLCALC 111 (H) 08/24/2015     Lab Results   Component Value Date    LABVLDL 7 11/01/2021    LABVLDL 11 08/29/2016    LABVLDL 7 08/24/2015     No results found for: CHOLHDLRATIO    No results found for: INR, PROTIME    The 10-year ASCVD risk score (Nel ROSE, et al., 2019) is: 7.9%    Values used to calculate the score:      Age: 78 years      Sex: Female      Is Non- : Yes      Diabetic: No      Tobacco smoker: No      Systolic Blood Pressure: 827 mmHg      Is BP treated: No      HDL Cholesterol: 74 mg/dL      Total Cholesterol: 191 mg/dL      Imaging:       ECG (if available, Personally interpreted):    Last Monitor/Holter (if available):    Last Stress (if available):    Last Cath (if available):    Last TTE/FAMILIA(if available):    Last CMR  (if available):    Last Coronary Artery Calcium Score: Ankle-brachial index:    Carotid ultrasound screening:    Abdominal aortic aneurysm screening:    Assessment / Plan:     1. Bradycardia    2. Parkinson disease [facility]) - diagnosed age 64       27 -day event monitor to exclude significant bradyarrhythmias  Echocardiogram to evaluate for structural abnormalities  Follow-up after testing complete      Orders Placed This Encounter   Procedures    Cardiac event monitor    ECHO Complete 2D W Doppler W Color         The note was completed using EMR and Dragon dictation system. Every effort was made to ensure accuracy; however, inadvertent computerized transcription errors may be present. All questions and concerns were addressed to the patient. I would like to thank you for providing me the opportunity to participate in the care of your patient. If you have any questions, please do not hesitate to contact me.      Evan Hastings MD, [facility]  [facility] [address]  [address] [place] [id number]  Main Office Phone: [phone]  Fax: [phone]
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
## 2022-11-03 ENCOUNTER — OFFICE VISIT (OUTPATIENT)
Dept: CARDIOLOGY CLINIC | Age: 67
End: 2022-11-03
Payer: MEDICARE

## 2022-11-03 VITALS
HEIGHT: 64 IN | BODY MASS INDEX: 21.75 KG/M2 | HEART RATE: 58 BPM | OXYGEN SATURATION: 99 % | WEIGHT: 127.4 LBS | DIASTOLIC BLOOD PRESSURE: 76 MMHG | SYSTOLIC BLOOD PRESSURE: 128 MMHG

## 2022-11-03 DIAGNOSIS — R00.1 BRADYCARDIA: Primary | ICD-10-CM

## 2022-11-03 DIAGNOSIS — G20 PARKINSON DISEASE (HCC): ICD-10-CM

## 2022-11-03 PROCEDURE — G8484 FLU IMMUNIZE NO ADMIN: HCPCS | Performed by: INTERNAL MEDICINE

## 2022-11-03 PROCEDURE — 99204 OFFICE O/P NEW MOD 45 MIN: CPT | Performed by: INTERNAL MEDICINE

## 2022-11-03 PROCEDURE — G8420 CALC BMI NORM PARAMETERS: HCPCS | Performed by: INTERNAL MEDICINE

## 2022-11-03 PROCEDURE — 1090F PRES/ABSN URINE INCON ASSESS: CPT | Performed by: INTERNAL MEDICINE

## 2022-11-03 PROCEDURE — G8427 DOCREV CUR MEDS BY ELIG CLIN: HCPCS | Performed by: INTERNAL MEDICINE

## 2022-11-03 ASSESSMENT — ENCOUNTER SYMPTOMS
ABDOMINAL PAIN: 0
VOMITING: 0

## 2022-11-07 ENCOUNTER — PROCEDURE VISIT (OUTPATIENT)
Dept: CARDIOLOGY CLINIC | Age: 67
End: 2022-11-07
Payer: MEDICARE

## 2022-11-07 DIAGNOSIS — R00.1 BRADYCARDIA: ICD-10-CM

## 2022-11-07 LAB
LV EF: 58 %
LVEF MODALITY: NORMAL

## 2022-11-07 PROCEDURE — 93306 TTE W/DOPPLER COMPLETE: CPT | Performed by: INTERNAL MEDICINE

## 2022-11-17 ENCOUNTER — HOSPITAL ENCOUNTER (OUTPATIENT)
Dept: GENERAL RADIOLOGY | Age: 67
Discharge: HOME | End: 2022-11-17
Payer: MEDICARE

## 2022-11-17 DIAGNOSIS — M85.89 OSTEOPENIA OF MULTIPLE SITES: ICD-10-CM

## 2022-11-17 DIAGNOSIS — Z78.0 POST-MENOPAUSE: ICD-10-CM

## 2022-11-17 PROCEDURE — 77080 DXA BONE DENSITY AXIAL: CPT

## 2022-11-18 DIAGNOSIS — S13.4XXA WHIPLASH INJURY TO NECK, INITIAL ENCOUNTER: ICD-10-CM

## 2022-11-18 DIAGNOSIS — M79.18 LEFT BUTTOCK PAIN: ICD-10-CM

## 2022-11-18 DIAGNOSIS — M81.0 AGE-RELATED OSTEOPOROSIS WITHOUT CURRENT PATHOLOGICAL FRACTURE: ICD-10-CM

## 2022-11-18 DIAGNOSIS — G44.209 ACUTE NON INTRACTABLE TENSION-TYPE HEADACHE: ICD-10-CM

## 2022-11-18 RX ORDER — MELOXICAM 15 MG/1
15 TABLET ORAL DAILY PRN
Qty: 30 TABLET | Refills: 0 | Status: SHIPPED | OUTPATIENT
Start: 2022-11-18 | End: 2022-12-18
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
## 2022-11-18 NOTE — RESULT ENCOUNTER NOTE
Let pt know her bone health has worsened significantly. I want to have her see our osteoporosis specialist - referral is in. Provide number to call and get scheduled. Lets also just have her set a f/u with me in about 3 months so we can catch up on everything.      [facility]opor[facility] - MD Jami Jorge, [address]   [place], [address]   Ph: [phone]

## 2022-11-20 DIAGNOSIS — M79.18 LEFT BUTTOCK PAIN: ICD-10-CM

## 2022-11-20 DIAGNOSIS — G44.209 ACUTE NON INTRACTABLE TENSION-TYPE HEADACHE: ICD-10-CM

## 2022-11-20 DIAGNOSIS — S13.4XXA WHIPLASH INJURY TO NECK, INITIAL ENCOUNTER: ICD-10-CM

## 2022-11-21 ENCOUNTER — TELEPHONE (OUTPATIENT)
Dept: PRIMARY CARE CLINIC | Age: 67
End: 2022-11-21

## 2022-11-21 ENCOUNTER — TELEPHONE (OUTPATIENT)
Dept: ENDOCRINOLOGY | Age: 67
End: 2022-11-21

## 2022-11-21 RX ORDER — MELOXICAM 15 MG/1
15 TABLET ORAL DAILY PRN
Qty: 90 TABLET | OUTPATIENT
Start: 2022-11-21 | End: 2022-12-21

## 2022-11-21 NOTE — TELEPHONE ENCOUNTER
Patient had asked last week about her blood tests results. They were not in Epic  I had the lab fax them   They are scanned into media. Patient would like the results.

## 2022-11-21 NOTE — TELEPHONE ENCOUNTER
Medication:   Requested Prescriptions     Pending Prescriptions Disp Refills    meloxicam (MOBIC) 15 MG tablet [Pharmacy Med Name: MELOXICAM 15MG TABLETS] 90 tablet      Sig: TAKE 1 TABLET BY MOUTH DAILY AS NEEDED FOR PAIN         Last appt: 11/2/2022   Next appt: 2/16/2023  Patient is requesting a 90 day supply for medication.

## 2022-12-09 NOTE — PROGRESS NOTES
[address] [facility]     Outpatient Cardiology         Patient Name:  Mary TREVINOYULCHEMO  Requesting Physician: Merrick Santana DO  Primary Care Physician: Merrick Santana DO    Reason for Consultation/Chief Complaint:   F/u Bradycardia    HPI:     78year old female with  Parkinson's Disease here for 1 month follow up. Chronically bradycardic with heart rates in the 50s. Reviewed results from 28 day Event monitor, SB, no significant pauses; remains asymptomatic    Echo 11/7/22, EF 55-60%  28 day Event Monitor, 11/03/22-12/01/22  SR, Avg HR 57 bpm, Max HR 99 bpm, Min HR 35 bpm  0 AF, 0 AV block, 0 pauses, 0 VT  16 episodes SVT, longest episode SVT 9 beats. PVC burden at 1.74%  PSVC burden at 0.27%         Histories:     Past Medical History:   has a past medical history of Age-related osteoporosis without current pathological fracture, Asthma, Bradycardia, Degenerative arthritis of left knee, History of recurrent UTIs, Parkinson disease ([facility] [address] - diagnosed age 62, and Urinary incontinence. Surgical History:   has a past surgical history that includes laparoscopy; Tonsillectomy; Colonoscopy (11/06); Colonoscopy (7/15); and Finger surgery (Bilateral). Social History:   reports that she has never smoked. She has never used smokeless tobacco. She reports current alcohol use. She reports that she does not use drugs. Family History:  No evidence for sudden cardiac death or premature CAD    Medications:     Home Medications:  Were reviewed and are listed in nursing record. and/or listed below    Prior to Admission medications    Medication Sig Start Date End Date Taking? Authorizing Provider   meloxicam (MOBIC) 15 MG tablet Take 1 tablet by mouth daily as needed for Pain 11/18/22 12/18/22 Yes Merrick Santana DO   ciprofloxacin (CIPRO) 250 MG tablet Take one tablet after intercourse to prevent bladder infection. Take 1 tablet twice a day for three days if an infection occurs. 6/7/21  Yes David Munroe DO   loratadine (CLARITIN) 10 MG tablet Take 10 mg by mouth daily   Yes Historical Provider, MD   ascorbic acid (VITAMIN C) 500 MG tablet Take 500 mg by mouth daily   Yes Historical Provider, MD   carbidopa-levodopa (SINEMET)  MG per tablet Take 1 tablet by mouth 12/17/20  Yes Historical Provider, MD   albuterol sulfate HFA (VENTOLIN HFA) 108 (90 Base) MCG/ACT inhaler Inhale 2 puffs into the lungs every 6 hours as needed for Wheezing 11/15/17 12/15/22 Yes Hayley Briceno MD   PRAMIPEXOLE DIHYDROCHLORIDE PO Take by mouth Dr Al Dunham   Yes Historical Provider, MD   gabapentin (NEURONTIN) 300 MG capsule Take 100 mg by mouth 3 times daily. Yes Historical Provider, MD   Multiple Vitamins-Minerals (MULTIVITAMIN PO) Take  by mouth. Yes Historical Provider, MD   Calcium Carb-Cholecalciferol (CALCIUM 1000 + D PO) Take  by mouth. Yes Historical Provider, MD        Allergy:     Latex, Penicillins, and Codeine     Review of Systems:     Review of Systems    Physical Examination:     Vitals:    12/15/22 0918   BP: 124/68   Pulse: 62   SpO2: 97%   Weight: 129 lb 3.2 oz (58.6 kg)   Height: 5' 4\" (1.626 m)         Wt Readings from Last 3 Encounters:   12/15/22 129 lb 3.2 oz (58.6 kg)   11/03/22 127 lb 6.4 oz (57.8 kg)   11/02/22 124 lb 12.8 oz (56.6 kg)       Physical Exam  Constitutional:       Appearance: Normal appearance. HENT:      Head: Normocephalic and atraumatic. Nose: Nose normal.   Eyes:      Conjunctiva/sclera: Conjunctivae normal.   Cardiovascular:      Rate and Rhythm: Regular rhythm. Bradycardia present. Heart sounds: Normal heart sounds. Pulmonary:      Effort: Pulmonary effort is normal.      Breath sounds: Normal breath sounds. Abdominal:      Palpations: Abdomen is soft. Musculoskeletal:      Cervical back: Neck supple. Skin:     General: Skin is warm and dry. Neurological:      General: No focal deficit present.       Mental Status: She is alert. Labs:     Lab Results   Component Value Date    WBC 3.0 (L) 11/01/2021    HGB 13.1 11/01/2021    HCT 40.4 11/01/2021    MCV 94.9 11/01/2021     11/01/2021     Lab Results   Component Value Date     11/01/2021    K 4.3 11/01/2021     11/01/2021    CO2 25 11/01/2021    BUN 16 11/01/2021    CREATININE 0.6 11/01/2021    GLUCOSE 83 11/01/2021    CALCIUM 9.7 11/01/2021    PROT 6.9 11/01/2021    LABALBU 4.7 11/01/2021    BILITOT 0.5 11/01/2021    ALKPHOS 94 11/01/2021    AST 23 11/01/2021    ALT 6 (L) 11/01/2021    LABGLOM >60 11/01/2021    GFRAA >60 11/01/2021    AGRATIO 2.1 11/01/2021         Lab Results   Component Value Date    CHOL 178 08/29/2016    CHOL 184 08/24/2015    CHOL 161 04/14/2014     Lab Results   Component Value Date    TRIG 55 08/29/2016    TRIG 35 08/24/2015    TRIG 32 04/14/2014     Lab Results   Component Value Date    HDL 74 (H) 11/01/2021    HDL 65 (H) 08/29/2016    HDL 66 (H) 08/24/2015     Lab Results   Component Value Date    LDLCALC 110 (H) 11/01/2021    LDLCALC 102 (H) 08/29/2016    LDLCALC 111 (H) 08/24/2015     Lab Results   Component Value Date    LABVLDL 7 11/01/2021    LABVLDL 11 08/29/2016    LABVLDL 7 08/24/2015     No results found for: CHOLHDLRATIO    No results found for: INR, PROTIME    The 10-year ASCVD risk score (Nel ROSE, et al., 2019) is: 7.3%    Values used to calculate the score:      Age: 78 years      Sex: Female      Is Non- : Yes      Diabetic: No      Tobacco smoker: No      Systolic Blood Pressure: 649 mmHg      Is BP treated: No      HDL Cholesterol: 74 mg/dL      Total Cholesterol: 191 mg/dL      Imaging:       ECG (if available, Personally interpreted): 11/2/22  SB, 44 - Borderline rhythm    Last Monitor/Holter : 28 day event monitor, 11/03/22-12/01/22  SR, Avg HR 57 bpm, Max HR 99 bpm, Min HR 35 bpm  0 AF, 0 AV block, 0 pauses, 0 VT  16 episodes SVT, longest episode SVT 9 beats.   PVC burden at 1.74%  PSVC burden at 0.27%      Last Stress (if available):    Last Cath (if available):    Last TTE: 11/7/22   Summary   Normal left ventricle size, wall thickness, and systolic function with an estimated ejection fraction of 55-60%. No regional wall motion abnormalities are seen. Diastolic filling parameters suggests normal diastolic function. Mild mitral valve   Mild-moderate tricuspid valve regurgitation present. Estimated pulmonary artery systolic pressure is 34 mmHg assuming a right   atrial pressure of 3 mmHg. Findings   Left Ventricle   Normal left ventricle size, wall thickness, and systolic function with an   estimated ejection fraction of 55-60%. No regional wall motion abnormalities   are seen. Diastolic filling parameters suggests normal diastolic function. Mitral Valve   The mitral valve is normal in structure and function. Mild mitral regurgitation is present. No evidence of mitral valve stenosis. Left Atrium   Left atrium is of normal size. Aortic Valve   The aortic valve appears tricuspid. The aortic valve is structurally normal.   No aortic valve regurgitation. No evidence of aortic valve stenosis. Aorta   The aortic root is normal in size. Right Ventricle   Normal right ventricular size and function. TAPSE 2.46 cm   RV S velocity 14.5 cm/s   Tricuspid Valve   Tricuspid valve is structurally normal.   Mild=moderate tricuspid regurgitation. Estimated pulmonary artery systolic pressure is 34 mmHg assuming a right   atrial pressure of 3 mmHg. No evidence of tricuspid stenosis. Right Atrium   The right atrium is normal in size. Pulmonic Valve   The pulmonic valve is not well visualized. Mild pulmonic regurgitation present. No evidence of pulmonic valve stenosis. Pericardial Effusion   No evidence of any pericardial effusion. Pleural Effusion   There is no pleural effusion.    Miscellaneous   Inferior vena cava appears normal .    Last CMR  (if available):    Last Coronary Artery Calcium Score: Ankle-brachial index:    Carotid ultrasound screening:    Abdominal aortic aneurysm screening:    Assessment / Plan:     1. Bradycardia    2. Parkinson disease [facility]) - diagnosed age 62       Reviewed 29 Day event monitor; remains asymptomatic  LV function normal   Mild  moderate TR by echo  Continue to follow clinically  RTC 1 year      The note was completed using EMR and Dragon dictation system. Every effort was made to ensure accuracy; however, inadvertent computerized transcription errors may be present. All questions and concerns were addressed to the patient. I would like to thank you for providing me the opportunity to participate in the care of your patient. If you have any questions, please do not hesitate to contact me. Robert Burns MD, [facility] - [address] Office Phone: [phone]  Fax: [phone]    Physician Attestation:  The scribes documentation has been prepared under my direction and personally reviewed by me in its entirety. I confirm the note above accurately reflects all work, treatment, procedures, and medical decision making performed by me.     Electronically signed by Robert Burns MD on 12/15/2022 at 8:14 PM

## 2022-12-15 ENCOUNTER — OFFICE VISIT (OUTPATIENT)
Dept: CARDIOLOGY CLINIC | Age: 67
End: 2022-12-15

## 2022-12-15 VITALS
SYSTOLIC BLOOD PRESSURE: 124 MMHG | HEART RATE: 62 BPM | OXYGEN SATURATION: 97 % | HEIGHT: 64 IN | DIASTOLIC BLOOD PRESSURE: 68 MMHG | WEIGHT: 129.2 LBS | BODY MASS INDEX: 22.06 KG/M2

## 2022-12-15 DIAGNOSIS — G20 PARKINSON DISEASE (HCC): ICD-10-CM

## 2022-12-15 DIAGNOSIS — R00.1 BRADYCARDIA: Primary | ICD-10-CM

## 2022-12-20 DIAGNOSIS — G44.209 ACUTE NON INTRACTABLE TENSION-TYPE HEADACHE: ICD-10-CM

## 2022-12-20 DIAGNOSIS — S13.4XXA WHIPLASH INJURY TO NECK, INITIAL ENCOUNTER: ICD-10-CM

## 2022-12-20 DIAGNOSIS — M79.18 LEFT BUTTOCK PAIN: ICD-10-CM

## 2022-12-22 DIAGNOSIS — R00.1 BRADYCARDIA: Primary | ICD-10-CM

## 2022-12-22 RX ORDER — MELOXICAM 15 MG/1
15 TABLET ORAL DAILY PRN
Qty: 30 TABLET | Refills: 0 | OUTPATIENT
Start: 2022-12-22 | End: 2023-01-21

## 2023-02-16 ENCOUNTER — OFFICE VISIT (OUTPATIENT)
Dept: PRIMARY CARE CLINIC | Age: 68
End: 2023-02-16
Payer: MEDICARE

## 2023-02-16 VITALS
TEMPERATURE: 97.7 F | WEIGHT: 128.4 LBS | BODY MASS INDEX: 22.04 KG/M2 | DIASTOLIC BLOOD PRESSURE: 61 MMHG | SYSTOLIC BLOOD PRESSURE: 114 MMHG | HEART RATE: 50 BPM

## 2023-02-16 DIAGNOSIS — S13.4XXA NECK PAIN WITH NECK STIFFNESS AFTER WHIPLASH INJURY TO NECK: ICD-10-CM

## 2023-02-16 DIAGNOSIS — Z80.0 FAMILY HISTORY OF COLON CANCER IN MOTHER: ICD-10-CM

## 2023-02-16 DIAGNOSIS — R00.1 BRADYCARDIA: ICD-10-CM

## 2023-02-16 DIAGNOSIS — G44.209 ACUTE NON INTRACTABLE TENSION-TYPE HEADACHE: Primary | ICD-10-CM

## 2023-02-16 DIAGNOSIS — M81.0 AGE-RELATED OSTEOPOROSIS WITHOUT CURRENT PATHOLOGICAL FRACTURE: ICD-10-CM

## 2023-02-16 PROCEDURE — G8484 FLU IMMUNIZE NO ADMIN: HCPCS | Performed by: FAMILY MEDICINE

## 2023-02-16 PROCEDURE — 3017F COLORECTAL CA SCREEN DOC REV: CPT | Performed by: FAMILY MEDICINE

## 2023-02-16 PROCEDURE — 99213 OFFICE O/P EST LOW 20 MIN: CPT | Performed by: FAMILY MEDICINE

## 2023-02-16 PROCEDURE — G8420 CALC BMI NORM PARAMETERS: HCPCS | Performed by: FAMILY MEDICINE

## 2023-02-16 PROCEDURE — 1090F PRES/ABSN URINE INCON ASSESS: CPT | Performed by: FAMILY MEDICINE

## 2023-02-16 PROCEDURE — G8427 DOCREV CUR MEDS BY ELIG CLIN: HCPCS | Performed by: FAMILY MEDICINE

## 2023-02-16 PROCEDURE — 1036F TOBACCO NON-USER: CPT | Performed by: FAMILY MEDICINE

## 2023-02-16 PROCEDURE — G8399 PT W/DXA RESULTS DOCUMENT: HCPCS | Performed by: FAMILY MEDICINE

## 2023-02-16 PROCEDURE — 1123F ACP DISCUSS/DSCN MKR DOCD: CPT | Performed by: FAMILY MEDICINE

## 2023-02-16 SDOH — ECONOMIC STABILITY: FOOD INSECURITY: WITHIN THE PAST 12 MONTHS, YOU WORRIED THAT YOUR FOOD WOULD RUN OUT BEFORE YOU GOT MONEY TO BUY MORE.: NEVER TRUE

## 2023-02-16 SDOH — ECONOMIC STABILITY: FOOD INSECURITY: WITHIN THE PAST 12 MONTHS, THE FOOD YOU BOUGHT JUST DIDN'T LAST AND YOU DIDN'T HAVE MONEY TO GET MORE.: NEVER TRUE

## 2023-02-16 SDOH — ECONOMIC STABILITY: INCOME INSECURITY: HOW HARD IS IT FOR YOU TO PAY FOR THE VERY BASICS LIKE FOOD, HOUSING, MEDICAL CARE, AND HEATING?: NOT HARD AT ALL

## 2023-02-16 SDOH — ECONOMIC STABILITY: HOUSING INSECURITY
IN THE LAST 12 MONTHS, WAS THERE A TIME WHEN YOU DID NOT HAVE A STEADY PLACE TO SLEEP OR SLEPT IN A SHELTER (INCLUDING NOW)?: NO

## 2023-02-16 ASSESSMENT — ENCOUNTER SYMPTOMS
CONSTIPATION: 0
EYE PAIN: 0
ABDOMINAL PAIN: 0
DIARRHEA: 0
EYE ITCHING: 0
NAUSEA: 0
VOMITING: 0
COUGH: 0
SORE THROAT: 0
SHORTNESS OF BREATH: 0
WHEEZING: 0

## 2023-02-16 ASSESSMENT — PATIENT HEALTH QUESTIONNAIRE - PHQ9
SUM OF ALL RESPONSES TO PHQ QUESTIONS 1-9: 0
1. LITTLE INTEREST OR PLEASURE IN DOING THINGS: 0
SUM OF ALL RESPONSES TO PHQ QUESTIONS 1-9: 0
SUM OF ALL RESPONSES TO PHQ QUESTIONS 1-9: 0
SUM OF ALL RESPONSES TO PHQ9 QUESTIONS 1 & 2: 0
SUM OF ALL RESPONSES TO PHQ QUESTIONS 1-9: 0
2. FEELING DOWN, DEPRESSED OR HOPELESS: 0
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
## 2023-03-06 ENCOUNTER — TELEPHONE (OUTPATIENT)
Dept: ENDOCRINOLOGY | Age: 68
End: 2023-03-06

## 2023-05-18 ENCOUNTER — TELEPHONE (OUTPATIENT)
Dept: ENDOCRINOLOGY | Age: 68
End: 2023-05-18

## 2023-06-05 ENCOUNTER — TELEPHONE (OUTPATIENT)
Dept: CARDIOLOGY CLINIC | Age: 68
End: 2023-06-05

## 2023-06-05 NOTE — TELEPHONE ENCOUNTER
Patient scheduled for appt 6/14/23    CARDIAC CLEARANCE     What type of procedure are you having? Left knee replacement     Which physician is performing your procedure? Dr. Belen Reardon    When is your procedure scheduled for? Unknown until seen    Where are you having this procedure? [facility]     Are you taking Blood Thinners? no   If so what? (Name/dose/frequesncy)    Does the surgeon want you to stop your blood thinner? If so for how long?     Phone Number and Contact Name for Physicians office: Mini Rose [phone]    Fax number to send information: [phone]

## 2023-06-14 PROBLEM — R94.31 ABNORMAL EKG: Status: ACTIVE | Noted: 2023-06-14

## 2023-06-14 PROBLEM — Z01.818 PRE-OP EXAM: Status: ACTIVE | Noted: 2023-06-14

## 2023-06-14 PROBLEM — Z01.810 PRE-OPERATIVE CARDIOVASCULAR EXAMINATION: Status: ACTIVE | Noted: 2023-06-14

## 2023-06-23 ENCOUNTER — HOSPITAL ENCOUNTER (OUTPATIENT)
Dept: NON INVASIVE DIAGNOSTICS | Age: 68
Discharge: HOME | End: 2023-06-23
Payer: MEDICARE

## 2023-06-23 DIAGNOSIS — Z01.818 PREOPERATIVE CLEARANCE: ICD-10-CM

## 2023-06-23 DIAGNOSIS — R94.31 ABNORMAL EKG: ICD-10-CM

## 2023-06-23 LAB
LV EF: 69 %
LVEF MODALITY: NORMAL

## 2023-06-23 PROCEDURE — 3430000000 HC RX DIAGNOSTIC RADIOPHARMACEUTICAL: Performed by: INTERNAL MEDICINE

## 2023-06-23 PROCEDURE — 78452 HT MUSCLE IMAGE SPECT MULT: CPT

## 2023-06-23 PROCEDURE — 93017 CV STRESS TEST TRACING ONLY: CPT

## 2023-06-23 PROCEDURE — A9502 TC99M TETROFOSMIN: HCPCS | Performed by: INTERNAL MEDICINE

## 2023-06-23 PROCEDURE — 6360000002 HC RX W HCPCS: Performed by: INTERNAL MEDICINE

## 2023-06-23 RX ORDER — REGADENOSON 0.08 MG/ML
0.4 INJECTION, SOLUTION INTRAVENOUS
Status: COMPLETED | OUTPATIENT
Start: 2023-06-23 | End: 2023-06-23

## 2023-06-23 RX ADMIN — TETROFOSMIN 30 MILLICURIE: 1.38 INJECTION, POWDER, LYOPHILIZED, FOR SOLUTION INTRAVENOUS at 11:06

## 2023-06-23 RX ADMIN — TETROFOSMIN 10 MILLICURIE: 1.38 INJECTION, POWDER, LYOPHILIZED, FOR SOLUTION INTRAVENOUS at 09:51

## 2023-06-23 RX ADMIN — REGADENOSON 0.4 MG: 0.08 INJECTION, SOLUTION INTRAVENOUS at 11:06

## 2023-06-28 ENCOUNTER — TELEPHONE (OUTPATIENT)
Dept: CARDIOLOGY CLINIC | Age: 68
End: 2023-06-28

## 2023-07-05 ENCOUNTER — OFFICE VISIT (OUTPATIENT)
Dept: PRIMARY CARE CLINIC | Age: 68
End: 2023-07-05

## 2023-07-05 VITALS
HEART RATE: 52 BPM | WEIGHT: 122.2 LBS | SYSTOLIC BLOOD PRESSURE: 130 MMHG | DIASTOLIC BLOOD PRESSURE: 70 MMHG | BODY MASS INDEX: 20.98 KG/M2

## 2023-07-05 DIAGNOSIS — M19.90 ARTHRITIS: ICD-10-CM

## 2023-07-05 DIAGNOSIS — Z01.818 PRE-OP EVALUATION: ICD-10-CM

## 2023-07-05 DIAGNOSIS — R00.1 BRADYCARDIA: ICD-10-CM

## 2023-07-05 DIAGNOSIS — H25.9 AGE-RELATED CATARACT OF BOTH EYES, UNSPECIFIED AGE-RELATED CATARACT TYPE: ICD-10-CM

## 2023-07-05 DIAGNOSIS — H25.9 AGE-RELATED CATARACT OF BOTH EYES, UNSPECIFIED AGE-RELATED CATARACT TYPE: Primary | ICD-10-CM

## 2023-07-05 ASSESSMENT — ENCOUNTER SYMPTOMS
SHORTNESS OF BREATH: 0
COUGH: 0
ABDOMINAL PAIN: 0

## 2023-07-05 NOTE — PROGRESS NOTES
[address] [facility] PRIMARY CARE  [address] [facility] [address]  Dept: [phone]  Dept Fax: [phone]     7/5/2023      Martin Lady Juan Ramon Domingo   1955     Chief Complaint   Patient presents with    Pre-op Exam     Cataract surgery on 7/13/23 at [facility] with Dr. Mily Loo       HPI     Patient presents for pre-op. Will have cataract surgery for right eye on 7/13/23 at [facility]. She will also have a left total knee replacement on 8/1/23 at [facility]. History of OA.      PHQ Scores 2/16/2023 11/1/2022 9/26/2022 10/25/2021 5/3/2021   PHQ2 Score 0 1 0 0 0   PHQ9 Score 0 1 0 0 0     Interpretation of Total Score Depression Severity: 1-4 = Minimal depression, 5-9 = Mild depression, 10-14 = Moderate depression, 15-19 = Moderately severe depression, 20-27 = Severe depression     Prior to Visit Medications    Medication Sig Taking? Authorizing Provider   meloxicam (MOBIC) 15 MG tablet Take 1 tablet by mouth daily as needed for Pain  Nevin Trevino,    ciprofloxacin (CIPRO) 250 MG tablet Take one tablet after intercourse to prevent bladder infection. Take 1 tablet twice a day for three days if an infection occurs. Chuy Munroe,    loratadine (CLARITIN) 10 MG tablet Take 1 tablet by mouth daily  Historical Provider, MD   ascorbic acid (VITAMIN C) 500 MG tablet Take 1 tablet by mouth daily  Historical Provider, MD   carbidopa-levodopa (SINEMET)  MG per tablet Take 1 tablet by mouth  Historical Provider, MD   albuterol sulfate HFA (VENTOLIN HFA) 108 (90 Base) MCG/ACT inhaler Inhale 2 puffs into the lungs every 6 hours as needed for Livan Betancourt MD   PRAMIPEXOLE DIHYDROCHLORIDE PO Take by mouth Dr Glaser Doing Provider, MD   gabapentin (NEURONTIN) 300 MG capsule Take 100 mg by mouth 3 times daily. Historical Provider, MD   Multiple Vitamins-Minerals (MULTIVITAMIN PO) Take  by mouth.   Historical Provider, MD   Calcium Carb-Cholecalciferol (CALCIUM

## 2023-07-06 LAB
ANION GAP SERPL CALCULATED.3IONS-SCNC: 14 MMOL/L (ref 3–16)
APTT BLD: 28.8 SEC (ref 22.7–35.9)
BASOPHILS # BLD: 0 K/UL (ref 0–0.2)
BASOPHILS NFR BLD: 0.5 %
BUN SERPL-MCNC: 16 MG/DL (ref 7–20)
CALCIUM SERPL-MCNC: 9.5 MG/DL (ref 8.3–10.6)
CHLORIDE SERPL-SCNC: 104 MMOL/L (ref 99–110)
CO2 SERPL-SCNC: 24 MMOL/L (ref 21–32)
CREAT SERPL-MCNC: 0.7 MG/DL (ref 0.6–1.2)
DEPRECATED RDW RBC AUTO: 13.6 % (ref 12.4–15.4)
EOSINOPHIL # BLD: 0.3 K/UL (ref 0–0.6)
EOSINOPHIL NFR BLD: 6.7 %
GFR SERPLBLD CREATININE-BSD FMLA CKD-EPI: >60 ML/MIN/{1.73_M2}
GLUCOSE SERPL-MCNC: 107 MG/DL (ref 70–99)
HCT VFR BLD AUTO: 37.1 % (ref 36–48)
HGB BLD-MCNC: 12.3 G/DL (ref 12–16)
INR PPP: 1.12 (ref 0.84–1.16)
LYMPHOCYTES # BLD: 1.6 K/UL (ref 1–5.1)
LYMPHOCYTES NFR BLD: 35.2 %
MCH RBC QN AUTO: 30.5 PG (ref 26–34)
MCHC RBC AUTO-ENTMCNC: 33.2 G/DL (ref 31–36)
MCV RBC AUTO: 92.1 FL (ref 80–100)
MONOCYTES # BLD: 0.4 K/UL (ref 0–1.3)
MONOCYTES NFR BLD: 8.2 %
NEUTROPHILS # BLD: 2.3 K/UL (ref 1.7–7.7)
NEUTROPHILS NFR BLD: 49.4 %
PLATELET # BLD AUTO: 203 K/UL (ref 135–450)
PMV BLD AUTO: 7.8 FL (ref 5–10.5)
POTASSIUM SERPL-SCNC: 3.9 MMOL/L (ref 3.5–5.1)
PROTHROMBIN TIME: 14.4 SEC (ref 11.5–14.8)
RBC # BLD AUTO: 4.03 M/UL (ref 4–5.2)
SODIUM SERPL-SCNC: 142 MMOL/L (ref 136–145)
WBC # BLD AUTO: 4.7 K/UL (ref 4–11)

## 2023-07-14 PROBLEM — Z01.810 PRE-OPERATIVE CARDIOVASCULAR EXAMINATION: Status: RESOLVED | Noted: 2023-06-14 | Resolved: 2023-07-14

## 2023-07-14 PROBLEM — Z01.818 PRE-OP EXAM: Status: RESOLVED | Noted: 2023-06-14 | Resolved: 2023-07-14

## 2023-08-01 ENCOUNTER — TELEPHONE (OUTPATIENT)
Dept: PRIMARY CARE CLINIC | Age: 68
End: 2023-08-01

## 2023-10-13 ENCOUNTER — OFFICE VISIT (OUTPATIENT)
Dept: PRIMARY CARE CLINIC | Age: 68
End: 2023-10-13

## 2023-10-13 VITALS
WEIGHT: 119 LBS | OXYGEN SATURATION: 96 % | HEART RATE: 69 BPM | DIASTOLIC BLOOD PRESSURE: 53 MMHG | TEMPERATURE: 97.8 F | SYSTOLIC BLOOD PRESSURE: 108 MMHG | BODY MASS INDEX: 20.43 KG/M2

## 2023-10-13 DIAGNOSIS — J20.9 ACUTE BRONCHITIS, UNSPECIFIED ORGANISM: Primary | ICD-10-CM

## 2023-10-13 DIAGNOSIS — Z96.652 S/P TOTAL KNEE REPLACEMENT, LEFT: ICD-10-CM

## 2023-10-13 RX ORDER — AZITHROMYCIN 250 MG/1
250 TABLET, FILM COATED ORAL SEE ADMIN INSTRUCTIONS
Qty: 6 TABLET | Refills: 0 | Status: SHIPPED | OUTPATIENT
Start: 2023-10-13 | End: 2023-10-18

## 2023-10-13 RX ORDER — PREDNISONE 20 MG/1
40 TABLET ORAL DAILY
Qty: 10 TABLET | Refills: 0 | Status: SHIPPED | OUTPATIENT
Start: 2023-10-13 | End: 2023-10-18

## 2023-10-13 RX ORDER — RIBOFLAVIN (VITAMIN B2) 400 MG
400 TABLET ORAL DAILY
COMMUNITY
Start: 2023-03-09

## 2023-10-13 RX ORDER — LEVODOPA AND CARBIDOPA 195; 48.75 MG/1; MG/1
1 CAPSULE, EXTENDED RELEASE ORAL
COMMUNITY
Start: 2023-09-18

## 2023-10-13 NOTE — PATIENT INSTRUCTIONS
[address] [facility] Laboratory Locations - No appointment necessary. ? indicates the location is open Saturdays in addition to Monday through Friday. Call your preferred location for test preparation, business hours and other information you need. [employer] accepts [employer]. [facility]    ? [place] [id number] [facility]. [facility], [address]    Ph: [address] [facility], [address]    Ph: [phone]   ? [address]    [place], [address] [facility]    Ph: [address] Liliane Link, [address]    Ph: [phone] ? [place]   [address]   Ph: [phone]  ? [address] [facility], [address] [facility]    Ph: [place] [address] [facility], [address] [facility]   Ph: [phone]    NORTH    ? [place], [place] [address]    Ph: [phone]  [facility] [place]   [address] [facility], [facility] [place], [place] [address]e   Ph: Daniel Crawley. [place], [address] [facility]: [address]    Ph: [address] [facility].  [facility] [place], [place] [address]    Ph: [phone]

## 2023-11-03 ENCOUNTER — OFFICE VISIT (OUTPATIENT)
Dept: PRIMARY CARE CLINIC | Age: 68
End: 2023-11-03

## 2023-11-03 VITALS
TEMPERATURE: 97.8 F | BODY MASS INDEX: 20.01 KG/M2 | WEIGHT: 117.2 LBS | HEART RATE: 60 BPM | SYSTOLIC BLOOD PRESSURE: 107 MMHG | HEIGHT: 64 IN | DIASTOLIC BLOOD PRESSURE: 45 MMHG

## 2023-11-03 DIAGNOSIS — G20.A1 PARKINSON'S DISEASE, UNSPECIFIED WHETHER DYSKINESIA PRESENT, UNSPECIFIED WHETHER MANIFESTATIONS FLUCTUATE: ICD-10-CM

## 2023-11-03 DIAGNOSIS — Z12.31 BREAST CANCER SCREENING BY MAMMOGRAM: ICD-10-CM

## 2023-11-03 DIAGNOSIS — Z12.11 SCREENING FOR COLON CANCER: ICD-10-CM

## 2023-11-03 DIAGNOSIS — M81.0 AGE-RELATED OSTEOPOROSIS WITHOUT CURRENT PATHOLOGICAL FRACTURE: ICD-10-CM

## 2023-11-03 DIAGNOSIS — Z80.0 FAMILY HISTORY OF COLON CANCER IN MOTHER: ICD-10-CM

## 2023-11-03 DIAGNOSIS — Z23 NEED FOR PNEUMOCOCCAL VACCINE: ICD-10-CM

## 2023-11-03 DIAGNOSIS — Z23 FLU VACCINE NEED: ICD-10-CM

## 2023-11-03 DIAGNOSIS — Z00.00 MEDICARE ANNUAL WELLNESS VISIT, SUBSEQUENT: Primary | ICD-10-CM

## 2023-11-03 SDOH — HEALTH STABILITY: PHYSICAL HEALTH: ON AVERAGE, HOW MANY DAYS PER WEEK DO YOU ENGAGE IN MODERATE TO STRENUOUS EXERCISE (LIKE A BRISK WALK)?: 5 DAYS

## 2023-11-03 SDOH — HEALTH STABILITY: PHYSICAL HEALTH: ON AVERAGE, HOW MANY MINUTES DO YOU ENGAGE IN EXERCISE AT THIS LEVEL?: 30 MIN

## 2023-11-03 ASSESSMENT — PATIENT HEALTH QUESTIONNAIRE - PHQ9
SUM OF ALL RESPONSES TO PHQ QUESTIONS 1-9: 1
SUM OF ALL RESPONSES TO PHQ QUESTIONS 1-9: 1
2. FEELING DOWN, DEPRESSED OR HOPELESS: 1
SUM OF ALL RESPONSES TO PHQ QUESTIONS 1-9: 1
SUM OF ALL RESPONSES TO PHQ QUESTIONS 1-9: 1
1. LITTLE INTEREST OR PLEASURE IN DOING THINGS: 0
SUM OF ALL RESPONSES TO PHQ9 QUESTIONS 1 & 2: 1

## 2023-11-03 ASSESSMENT — LIFESTYLE VARIABLES
HOW OFTEN DO YOU HAVE A DRINK CONTAINING ALCOHOL: NEVER
HOW OFTEN DO YOU HAVE A DRINK CONTAINING ALCOHOL: 1
HOW MANY STANDARD DRINKS CONTAINING ALCOHOL DO YOU HAVE ON A TYPICAL DAY: 0
HOW MANY STANDARD DRINKS CONTAINING ALCOHOL DO YOU HAVE ON A TYPICAL DAY: PATIENT DOES NOT DRINK
HOW OFTEN DO YOU HAVE SIX OR MORE DRINKS ON ONE OCCASION: 1

## 2023-11-03 NOTE — PATIENT INSTRUCTIONS
information. Personalized Preventive Plan for Domingo DAILYXXUVN - 11/3/2023  Medicare offers a range of preventive health benefits. Some of the tests and screenings are paid in full while other may be subject to a deductible, co-insurance, and/or copay. Some of these benefits include a comprehensive review of your medical history including lifestyle, illnesses that may run in your family, and various assessments and screenings as appropriate. After reviewing your medical record and screening and assessments performed today your provider may have ordered immunizations, labs, imaging, and/or referrals for you. A list of these orders (if applicable) as well as your Preventive Care list are included within your After Visit Summary for your review. Other Preventive Recommendations:    A preventive eye exam performed by an eye specialist is recommended every 1-2 years to screen for glaucoma; cataracts, macular degeneration, and other eye disorders. A preventive dental visit is recommended every 6 months. Try to get at least 150 minutes of exercise per week or 10,000 steps per day on a pedometer . Order or download the FREE \"Exercise & Physical Activity: Your Everyday Guide\" from [employer]. Call 1-902.540.7234 or search [employer] online. You need 6437-6864 mg of calcium and 7596-8626 IU of vitamin D per day. It is possible to meet your calcium requirement with diet alone, but a vitamin D supplement is usually necessary to meet this goal.  When exposed to the sun, use a sunscreen that protects against both UVA and UVB radiation with an SPF of 30 or greater. Reapply every 2 to 3 hours or after sweating, drying off with a towel, or swimming. Always wear a seat belt when traveling in a car. Always wear a helmet when riding a bicycle or motorcycle.

## 2023-11-03 NOTE — PROGRESS NOTES
Medicare Annual Wellness Visit    Lisa Ackerman is here for Medicare AWV (Fasting )    Assessment & Plan   Medicare annual wellness visit, subsequent  General wellness exam. Reviewed chart for past hx and updated today. Counseled on age appropriate health guidance and discussed screening recommendations. Vaccinations reviewed and discussed. All questions answered    Age-related osteoporosis without current pathological fracture  Reviewed past DEXA and referral that was placed. Pt never saw specialist. Has hesitation with meds for this. Recommend a discussion with specialist.  -     Mariah Barron MD, Endocrinology, [facility]    Screening for colon cancer  Discussed colon cancer screening options relating to patient's personal and family risk. Patient is interested in getting a referral for colonoscopy. This information has been provided and he is to call to get that visit set up. -     Justin Alvarado MD, Gastroenterology, [facility]  Family history of colon cancer in mother  -     Justin Alvarado MD, Gastroenterology, [facility]    Breast cancer screening by mammogram  Pt of appropriate age/risk for breast cancer screening. Discussion had today of mammogram screening and order placed. Discussed risks/benefits of screening. All questions answered. -     BRYCE DIGITAL SCREEN W OR WO CAD BILATERAL; Future  Flu vaccine need  -     Influenza, FLUAD, (age 72 y+), IM, PF, 0.5 mL  Need for pneumococcal vaccine  -     Pneumococcal, PCV20, PREVNAR 20, (age 6w+), IM, PF  Parkinson's disease, unspecified whether dyskinesia present, unspecified whether manifestations fluctuate    Recommendations for Preventive Services Due: see orders and patient instructions/AVS.  Recommended screening schedule for the next 5-10 years is provided to the patient in written form: see Patient Instructions/AVS.     Return in about 1 year (around 11/3/2024) for [employer].      Subjective   The following acute

## 2023-12-14 ENCOUNTER — TELEPHONE (OUTPATIENT)
Dept: ENDOCRINOLOGY | Age: 68
End: 2023-12-14

## 2024-01-03 ENCOUNTER — OFFICE VISIT (OUTPATIENT)
Dept: PRIMARY CARE CLINIC | Age: 69
End: 2024-01-03
Payer: MEDICARE

## 2024-01-03 VITALS
WEIGHT: 111.2 LBS | DIASTOLIC BLOOD PRESSURE: 72 MMHG | SYSTOLIC BLOOD PRESSURE: 126 MMHG | TEMPERATURE: 98 F | OXYGEN SATURATION: 99 % | BODY MASS INDEX: 19.22 KG/M2 | HEART RATE: 67 BPM

## 2024-01-03 DIAGNOSIS — J20.9 ACUTE BRONCHITIS, UNSPECIFIED ORGANISM: Primary | ICD-10-CM

## 2024-01-03 DIAGNOSIS — G20.A1 PARKINSON'S DISEASE, UNSPECIFIED WHETHER DYSKINESIA PRESENT, UNSPECIFIED WHETHER MANIFESTATIONS FLUCTUATE: ICD-10-CM

## 2024-01-03 DIAGNOSIS — R09.82 POST-NASAL DRIP: ICD-10-CM

## 2024-01-03 DIAGNOSIS — J45.21 MILD INTERMITTENT ASTHMA WITH ACUTE EXACERBATION: ICD-10-CM

## 2024-01-03 PROCEDURE — 99214 OFFICE O/P EST MOD 30 MIN: CPT | Performed by: FAMILY MEDICINE

## 2024-01-03 PROCEDURE — G8420 CALC BMI NORM PARAMETERS: HCPCS | Performed by: FAMILY MEDICINE

## 2024-01-03 PROCEDURE — G8399 PT W/DXA RESULTS DOCUMENT: HCPCS | Performed by: FAMILY MEDICINE

## 2024-01-03 PROCEDURE — 3017F COLORECTAL CA SCREEN DOC REV: CPT | Performed by: FAMILY MEDICINE

## 2024-01-03 PROCEDURE — 1123F ACP DISCUSS/DSCN MKR DOCD: CPT | Performed by: FAMILY MEDICINE

## 2024-01-03 PROCEDURE — 1090F PRES/ABSN URINE INCON ASSESS: CPT | Performed by: FAMILY MEDICINE

## 2024-01-03 PROCEDURE — 1036F TOBACCO NON-USER: CPT | Performed by: FAMILY MEDICINE

## 2024-01-03 PROCEDURE — G8427 DOCREV CUR MEDS BY ELIG CLIN: HCPCS | Performed by: FAMILY MEDICINE

## 2024-01-03 PROCEDURE — G8484 FLU IMMUNIZE NO ADMIN: HCPCS | Performed by: FAMILY MEDICINE

## 2024-01-03 RX ORDER — PREDNISONE 20 MG/1
40 TABLET ORAL DAILY
Qty: 10 TABLET | Refills: 0 | Status: SHIPPED | OUTPATIENT
Start: 2024-01-03 | End: 2024-01-08

## 2024-01-03 RX ORDER — FLUTICASONE PROPIONATE 50 MCG
1 SPRAY, SUSPENSION (ML) NASAL DAILY
Qty: 16 G | Refills: 0 | COMMUNITY
Start: 2024-01-03

## 2024-01-03 RX ORDER — ALBUTEROL SULFATE 90 UG/1
2 AEROSOL, METERED RESPIRATORY (INHALATION) EVERY 6 HOURS PRN
Qty: 1 EACH | Refills: 2 | Status: SHIPPED | OUTPATIENT
Start: 2024-01-03 | End: 2025-01-02

## 2024-01-03 ASSESSMENT — PATIENT HEALTH QUESTIONNAIRE - PHQ9
SUM OF ALL RESPONSES TO PHQ9 QUESTIONS 1 & 2: 1
SUM OF ALL RESPONSES TO PHQ QUESTIONS 1-9: 1
2. FEELING DOWN, DEPRESSED OR HOPELESS: 1
SUM OF ALL RESPONSES TO PHQ QUESTIONS 1-9: 1
1. LITTLE INTEREST OR PLEASURE IN DOING THINGS: 0
SUM OF ALL RESPONSES TO PHQ QUESTIONS 1-9: 1
SUM OF ALL RESPONSES TO PHQ QUESTIONS 1-9: 1

## 2024-01-03 NOTE — PROGRESS NOTES
inhaler Inhale 2 puffs into the lungs every 6 hours as needed for Wheezing Yes Nurys Bourne MD   PRAMIPEXOLE DIHYDROCHLORIDE PO Take by mouth Dr Goins Yes Alise Shaw MD   gabapentin (NEURONTIN) 300 MG capsule Take 100 mg by mouth 3 times daily. Yes Alise Shaw MD   Multiple Vitamins-Minerals (MULTIVITAMIN PO) Take  by mouth. Yes Alise hSaw MD   Calcium Carb-Cholecalciferol (CALCIUM 1000 + D PO) Take  by mouth. Yes Alise Shaw MD       Past Medical History:   Diagnosis Date    Age-related osteoporosis without current pathological fracture 03/20/2012    Asthma     Mild, seasonal    Bradycardia     Degenerative arthritis of left knee 11/01/2021    History of recurrent UTIs 05/04/2021    Parkinson disease (HCC) - diagnosed age 60 09/12/2016    Postmenopausal     Urinary incontinence 2016    Bladder leaks coughing laughing etc        Social History     Tobacco Use    Smoking status: Never    Smokeless tobacco: Never   Substance Use Topics    Alcohol use: Yes     Comment: Drink a little wine once in a while    Drug use: No        Past Surgical History:   Procedure Laterality Date    COLONOSCOPY  11/2006    Normal, repeat 11/11 due to (+)   Hilario    COLONOSCOPY  07/2015    Dr. Kennedy; one polyp, benign; repeat 7/20    FINGER SURGERY Bilateral     Trigger finger repair    JOINT REPLACEMENT Left     LAPAROSCOPY      TONSILLECTOMY          Allergies   Allergen Reactions    Latex Itching    Penicillins Hives    Codeine Nausea And Vomiting        Family History   Problem Relation Age of Onset    Asthma Mother     Cancer Mother         Colon    Arthritis Mother     Colon Cancer Mother     Heart Attack Father     High Blood Pressure Father     Arthritis Father     Stroke Sister         Patient's past medical history, surgical history, family history, medications, and allergies  were all reviewed and updated as appropriate today.    Review of systems per HPI above, otherwise

## 2024-01-03 NOTE — PATIENT INSTRUCTIONS
[facility] Laboratory Locations - No appointment necessary.  ? indicates the location is open Saturdays in addition to Monday through Friday.   Call your preferred location for test preparation, business hours and other information you need.   [facility] accepts all insurances.  [facility]  [place]  [place]    ? [place]   [address]   [address]   [place] [address]    Ph: [phone]  [facility]   [address] [place] [address]    Ph: [phone]   ? [place]   [address]    [place] [address]    Ph: [phone]     [facility]   [address] [place] [address]    Ph: [phone] ? [address] [place] [address]   Ph: [phone]  ? [facility]   [address] [place] [address]    Ph: [phone]      [place]   [address] [place] [address]   Ph: [phone]    [place]    ? [facility]   [address] [place] [address]    Ph: [phone]  [facility]   [address] [place] [address]   Ph: [phone]  [place]   [address], [address]    PH: [phone]    [facility]   [address] [place] [address]    Ph: [phone]    [facility]   [address] [place] [address]    Ph: [phone]

## 2024-01-11 NOTE — PROGRESS NOTES
(Nel ROSE, et al., 2019) is: 8.4%    Values used to calculate the score:      Age: 68 years      Sex: Female      Is Non- : Yes      Diabetic: No      Tobacco smoker: No      Systolic Blood Pressure: 126 mmHg      Is BP treated: No      HDL Cholesterol: 74 mg/dL      Total Cholesterol: 191 mg/dL      Imaging:       ECG (if available, Personally interpreted): 11/2/22  SB, 44 - Borderline rhythm    Last Monitor/Holter : 28 day event monitor, 11/03/22-12/01/22  SR, Avg HR 57 bpm, Max HR 99 bpm, Min HR 35 bpm  0 AF, 0 AV block, 0 pauses, 0 VT  16 episodes SVT, longest episode SVT 9 beats.  PVC burden at 1.74%  PSVC burden at 0.27%      Last Stress 6/2023:   Summary   There is normal isotope uptake at stress and rest. There is no evidence of   myocardial ischemia or scar.   Normal LV size and systolic function.   Left ventricular ejection fraction of 69 %.   There are no regional wall motion abnormalities.   Overall findings represent a low risk scan.    Last Cath (if available):    Last TTE: 11/7/22   Summary   Normal left ventricle size, wall thickness, and systolic function with an estimated ejection fraction of 55-60%.   No regional wall motion abnormalities are seen.   Diastolic filling parameters suggests normal diastolic function.   Mild mitral valve   Mild-moderate tricuspid valve regurgitation present.   Estimated pulmonary artery systolic pressure is 34 mmHg assuming a right   atrial pressure of 3 mmHg.    Findings   Left Ventricle   Normal left ventricle size, wall thickness, and systolic function with an   estimated ejection fraction of 55-60%. No regional wall motion abnormalities   are seen. Diastolic filling parameters suggests normal diastolic function.   Mitral Valve   The mitral valve is normal in structure and function.   Mild mitral regurgitation is present.   No evidence of mitral valve stenosis.   Left Atrium   Left atrium is of normal size.   Aortic Valve   The aortic valve

## 2024-01-12 ENCOUNTER — HOSPITAL ENCOUNTER (OUTPATIENT)
Dept: GENERAL RADIOLOGY | Age: 69
Discharge: HOME | End: 2024-01-12
Payer: MEDICARE

## 2024-01-12 ENCOUNTER — HOSPITAL ENCOUNTER (EMERGENCY)
Age: 69
Discharge: HOME | End: 2024-01-12
Attending: EMERGENCY MEDICINE
Payer: MEDICARE

## 2024-01-12 ENCOUNTER — APPOINTMENT (OUTPATIENT)
Dept: CT IMAGING | Age: 69
End: 2024-01-12
Payer: MEDICARE

## 2024-01-12 ENCOUNTER — OFFICE VISIT (OUTPATIENT)
Dept: CARDIOLOGY CLINIC | Age: 69
End: 2024-01-12
Payer: MEDICARE

## 2024-01-12 VITALS
TEMPERATURE: 98 F | WEIGHT: 113.54 LBS | BODY MASS INDEX: 20.12 KG/M2 | OXYGEN SATURATION: 97 % | HEIGHT: 63 IN | SYSTOLIC BLOOD PRESSURE: 173 MMHG | HEART RATE: 71 BPM | DIASTOLIC BLOOD PRESSURE: 93 MMHG | RESPIRATION RATE: 16 BRPM

## 2024-01-12 VITALS
BODY MASS INDEX: 19.67 KG/M2 | SYSTOLIC BLOOD PRESSURE: 126 MMHG | HEART RATE: 71 BPM | WEIGHT: 113.8 LBS | DIASTOLIC BLOOD PRESSURE: 74 MMHG | OXYGEN SATURATION: 94 %

## 2024-01-12 DIAGNOSIS — R00.1 BRADYCARDIA: ICD-10-CM

## 2024-01-12 DIAGNOSIS — R05.1 ACUTE COUGH: Primary | ICD-10-CM

## 2024-01-12 DIAGNOSIS — R06.00 DYSPNEA, UNSPECIFIED TYPE: ICD-10-CM

## 2024-01-12 DIAGNOSIS — R91.8 LUNG MASS: Primary | ICD-10-CM

## 2024-01-12 DIAGNOSIS — R05.1 ACUTE COUGH: ICD-10-CM

## 2024-01-12 LAB
ALBUMIN SERPL-MCNC: 3.7 G/DL (ref 3.4–5)
ALBUMIN/GLOB SERPL: 1.2 {RATIO} (ref 1.1–2.2)
ALP SERPL-CCNC: 105 U/L (ref 40–129)
ALT SERPL-CCNC: <5 U/L (ref 10–40)
ANION GAP SERPL CALCULATED.3IONS-SCNC: 13 MMOL/L (ref 3–16)
ANION GAP SERPL CALCULATED.3IONS-SCNC: 13 MMOL/L (ref 3–16)
AST SERPL-CCNC: 11 U/L (ref 15–37)
BASOPHILS # BLD: 0.1 K/UL (ref 0–0.2)
BASOPHILS # BLD: 0.1 K/UL (ref 0–0.2)
BASOPHILS NFR BLD: 0.9 %
BASOPHILS NFR BLD: 1.3 %
BILIRUB SERPL-MCNC: 0.5 MG/DL (ref 0–1)
BUN SERPL-MCNC: 12 MG/DL (ref 7–20)
BUN SERPL-MCNC: 13 MG/DL (ref 7–20)
CALCIUM SERPL-MCNC: 8.8 MG/DL (ref 8.3–10.6)
CALCIUM SERPL-MCNC: 9.3 MG/DL (ref 8.3–10.6)
CHLORIDE SERPL-SCNC: 102 MMOL/L (ref 99–110)
CHLORIDE SERPL-SCNC: 103 MMOL/L (ref 99–110)
CO2 SERPL-SCNC: 23 MMOL/L (ref 21–32)
CO2 SERPL-SCNC: 24 MMOL/L (ref 21–32)
CREAT SERPL-MCNC: 0.5 MG/DL (ref 0.6–1.2)
CREAT SERPL-MCNC: 0.6 MG/DL (ref 0.6–1.2)
DEPRECATED RDW RBC AUTO: 14.7 % (ref 12.4–15.4)
DEPRECATED RDW RBC AUTO: 15 % (ref 12.4–15.4)
EOSINOPHIL # BLD: 0.5 K/UL (ref 0–0.6)
EOSINOPHIL # BLD: 0.6 K/UL (ref 0–0.6)
EOSINOPHIL NFR BLD: 4.3 %
EOSINOPHIL NFR BLD: 5.2 %
GFR SERPLBLD CREATININE-BSD FMLA CKD-EPI: >60 ML/MIN/{1.73_M2}
GFR SERPLBLD CREATININE-BSD FMLA CKD-EPI: >60 ML/MIN/{1.73_M2}
GLUCOSE SERPL-MCNC: 100 MG/DL (ref 70–99)
GLUCOSE SERPL-MCNC: 106 MG/DL (ref 70–99)
HCT VFR BLD AUTO: 32.7 % (ref 36–48)
HCT VFR BLD AUTO: 34.4 % (ref 36–48)
HGB BLD-MCNC: 10.7 G/DL (ref 12–16)
HGB BLD-MCNC: 11 G/DL (ref 12–16)
LYMPHOCYTES # BLD: 1.3 K/UL (ref 1–5.1)
LYMPHOCYTES # BLD: 1.6 K/UL (ref 1–5.1)
LYMPHOCYTES NFR BLD: 12.3 %
LYMPHOCYTES NFR BLD: 15.3 %
MCH RBC QN AUTO: 28.2 PG (ref 26–34)
MCH RBC QN AUTO: 28.8 PG (ref 26–34)
MCHC RBC AUTO-ENTMCNC: 32 G/DL (ref 31–36)
MCHC RBC AUTO-ENTMCNC: 32.7 G/DL (ref 31–36)
MCV RBC AUTO: 88 FL (ref 80–100)
MCV RBC AUTO: 88.2 FL (ref 80–100)
MONOCYTES # BLD: 1 K/UL (ref 0–1.3)
MONOCYTES # BLD: 1 K/UL (ref 0–1.3)
MONOCYTES NFR BLD: 9.4 %
MONOCYTES NFR BLD: 9.5 %
NEUTROPHILS # BLD: 7.3 K/UL (ref 1.7–7.7)
NEUTROPHILS # BLD: 7.7 K/UL (ref 1.7–7.7)
NEUTROPHILS NFR BLD: 68.7 %
NEUTROPHILS NFR BLD: 73.1 %
PLATELET # BLD AUTO: 311 K/UL (ref 135–450)
PLATELET # BLD AUTO: 315 K/UL (ref 135–450)
PMV BLD AUTO: 6.8 FL (ref 5–10.5)
PMV BLD AUTO: 6.9 FL (ref 5–10.5)
POTASSIUM SERPL-SCNC: 3.8 MMOL/L (ref 3.5–5.1)
POTASSIUM SERPL-SCNC: 4 MMOL/L (ref 3.5–5.1)
PROT SERPL-MCNC: 6.8 G/DL (ref 6.4–8.2)
RBC # BLD AUTO: 3.72 M/UL (ref 4–5.2)
RBC # BLD AUTO: 3.91 M/UL (ref 4–5.2)
SODIUM SERPL-SCNC: 138 MMOL/L (ref 136–145)
SODIUM SERPL-SCNC: 140 MMOL/L (ref 136–145)
WBC # BLD AUTO: 10.6 K/UL (ref 4–11)
WBC # BLD AUTO: 10.6 K/UL (ref 4–11)

## 2024-01-12 PROCEDURE — G8399 PT W/DXA RESULTS DOCUMENT: HCPCS | Performed by: INTERNAL MEDICINE

## 2024-01-12 PROCEDURE — 80048 BASIC METABOLIC PNL TOTAL CA: CPT

## 2024-01-12 PROCEDURE — 1090F PRES/ABSN URINE INCON ASSESS: CPT | Performed by: INTERNAL MEDICINE

## 2024-01-12 PROCEDURE — G8484 FLU IMMUNIZE NO ADMIN: HCPCS | Performed by: INTERNAL MEDICINE

## 2024-01-12 PROCEDURE — 6360000004 HC RX CONTRAST MEDICATION: Performed by: EMERGENCY MEDICINE

## 2024-01-12 PROCEDURE — 36415 COLL VENOUS BLD VENIPUNCTURE: CPT

## 2024-01-12 PROCEDURE — 71275 CT ANGIOGRAPHY CHEST: CPT

## 2024-01-12 PROCEDURE — 99214 OFFICE O/P EST MOD 30 MIN: CPT | Performed by: INTERNAL MEDICINE

## 2024-01-12 PROCEDURE — 85025 COMPLETE CBC W/AUTO DIFF WBC: CPT

## 2024-01-12 PROCEDURE — 1036F TOBACCO NON-USER: CPT | Performed by: INTERNAL MEDICINE

## 2024-01-12 PROCEDURE — 71048 X-RAY EXAM CHEST 4+ VIEWS: CPT

## 2024-01-12 PROCEDURE — G8420 CALC BMI NORM PARAMETERS: HCPCS | Performed by: INTERNAL MEDICINE

## 2024-01-12 PROCEDURE — 3017F COLORECTAL CA SCREEN DOC REV: CPT | Performed by: INTERNAL MEDICINE

## 2024-01-12 PROCEDURE — 93005 ELECTROCARDIOGRAM TRACING: CPT | Performed by: EMERGENCY MEDICINE

## 2024-01-12 PROCEDURE — 99285 EMERGENCY DEPT VISIT HI MDM: CPT

## 2024-01-12 PROCEDURE — 1123F ACP DISCUSS/DSCN MKR DOCD: CPT | Performed by: INTERNAL MEDICINE

## 2024-01-12 PROCEDURE — G8427 DOCREV CUR MEDS BY ELIG CLIN: HCPCS | Performed by: INTERNAL MEDICINE

## 2024-01-12 RX ADMIN — IOPAMIDOL 75 ML: 755 INJECTION, SOLUTION INTRAVENOUS at 18:57

## 2024-01-12 ASSESSMENT — PAIN - FUNCTIONAL ASSESSMENT: PAIN_FUNCTIONAL_ASSESSMENT: NONE - DENIES PAIN

## 2024-01-12 NOTE — ED PROVIDER NOTES
[facility]  EMERGENCY DEPARTMENT ENCOUNTER          Attending Physician Note     Date of evaluation: 1/12/2024    Chief Complaint     Abnormal Test Results (Pt had outpatient CXR with Cardiology today. Sent for further eval. Results of CXR: \"1. Multifocal areas of nodular consolidation throughout the lung parenchyma. Primary differential consideration includes metastatic disease. Other infectious etiologies are also possible. Recommend chest CT with intravenous contrast forfurther assessment.\"/)      History of Present Illness     Nabor Valadez is a 68 y.o. female who comes in after abnormal chest x-ray was done earlier today.  The patient saw a primary care physician for increasing dyspnea on exertion especially going up stairs.  A chest x-ray was done which showed multiple spots on her lungs of unclear etiology.  The patient that she has had some shortness of breath, no fevers, no bloody sputum.  No history of cancer, not a smoker.      Review of Systems     Pertinent positive and negative findings as documented above in HPI, otherwise all other systems were reviewed and negative     Past Medical, Surgical, Family, and Social History     Medical History:   Past Medical History:   Diagnosis Date    Age-related osteoporosis without current pathological fracture 03/20/2012    Asthma     Mild, seasonal    Bradycardia     Degenerative arthritis of left knee 11/01/2021    History of recurrent UTIs 05/04/2021    Parkinson disease (HCC) - diagnosed age 60 09/12/2016    Postmenopausal     Urinary incontinence 2016    Bladder leaks coughing laughing etc       Surgical History:   Past Surgical History:   Procedure Laterality Date    COLONOSCOPY  11/2006    Normal, repeat 11/11 due to (+) FH  Hilario    COLONOSCOPY  07/2015    Dr. Kennedy; one polyp, benign; repeat 7/20    FINGER SURGERY Bilateral     Trigger finger repair    JOINT REPLACEMENT Left     LAPAROSCOPY      TONSILLECTOMY         Social History:

## 2024-01-13 LAB
EKG ATRIAL RATE: 73 BPM
EKG DIAGNOSIS: NORMAL
EKG P AXIS: 62 DEGREES
EKG P-R INTERVAL: 130 MS
EKG Q-T INTERVAL: 392 MS
EKG QRS DURATION: 86 MS
EKG QTC CALCULATION (BAZETT): 431 MS
EKG R AXIS: 37 DEGREES
EKG T AXIS: 33 DEGREES
EKG VENTRICULAR RATE: 73 BPM

## 2024-01-13 NOTE — DISCHARGE INSTRUCTIONS
Please contact [facility] for follow-up and Dr. Cotton who is pulmonary for follow-up and further testing

## 2024-01-17 ENCOUNTER — OFFICE VISIT (OUTPATIENT)
Dept: PULMONOLOGY | Age: 69
End: 2024-01-17
Payer: MEDICARE

## 2024-01-17 ENCOUNTER — TELEPHONE (OUTPATIENT)
Dept: PULMONOLOGY | Age: 69
End: 2024-01-17

## 2024-01-17 VITALS
WEIGHT: 110.4 LBS | HEIGHT: 63 IN | SYSTOLIC BLOOD PRESSURE: 116 MMHG | HEART RATE: 65 BPM | BODY MASS INDEX: 19.56 KG/M2 | OXYGEN SATURATION: 96 % | DIASTOLIC BLOOD PRESSURE: 60 MMHG

## 2024-01-17 DIAGNOSIS — R59.0 MEDIASTINAL LYMPHADENOPATHY: ICD-10-CM

## 2024-01-17 DIAGNOSIS — R91.8 MASS OF LOWER LOBE OF LEFT LUNG: Primary | ICD-10-CM

## 2024-01-17 DIAGNOSIS — R91.8 PULMONARY NODULES/LESIONS, MULTIPLE: ICD-10-CM

## 2024-01-17 PROCEDURE — 3017F COLORECTAL CA SCREEN DOC REV: CPT | Performed by: INTERNAL MEDICINE

## 2024-01-17 PROCEDURE — 99203 OFFICE O/P NEW LOW 30 MIN: CPT | Performed by: INTERNAL MEDICINE

## 2024-01-17 PROCEDURE — G8427 DOCREV CUR MEDS BY ELIG CLIN: HCPCS | Performed by: INTERNAL MEDICINE

## 2024-01-17 PROCEDURE — G8399 PT W/DXA RESULTS DOCUMENT: HCPCS | Performed by: INTERNAL MEDICINE

## 2024-01-17 PROCEDURE — G8420 CALC BMI NORM PARAMETERS: HCPCS | Performed by: INTERNAL MEDICINE

## 2024-01-17 PROCEDURE — 1123F ACP DISCUSS/DSCN MKR DOCD: CPT | Performed by: INTERNAL MEDICINE

## 2024-01-17 PROCEDURE — 1036F TOBACCO NON-USER: CPT | Performed by: INTERNAL MEDICINE

## 2024-01-17 PROCEDURE — 1090F PRES/ABSN URINE INCON ASSESS: CPT | Performed by: INTERNAL MEDICINE

## 2024-01-17 PROCEDURE — G8484 FLU IMMUNIZE NO ADMIN: HCPCS | Performed by: INTERNAL MEDICINE

## 2024-01-17 NOTE — TELEPHONE ENCOUNTER
Spoke to Mrs. Valadez and gave her the date and time of Bronch with EBUS (1/23/24 @ 1:00 pm) She was give instructions to stop all blood thinners 5 days to procedure, She is not to drink or eat after midnight the night before procedure, She knows to arrive 2 hours before the procedure (11:00 am), she is aware that she needs a  to and from this procedure. She will sign in with admitting/registration.  She gave a verbal understanding of these instuctions.  These instr has also been mailed to her.

## 2024-01-17 NOTE — PROGRESS NOTES
Nabor Valadez (:  1955) is a 68 y.o. female,New patient, here for evaluation of the following chief complaint(s):  New Patient (Ref by ER for lung mass)         ASSESSMENT/PLAN:  1. Mass of lower lobe of left lung  -     Bronchoscopy  2. Mediastinal lymphadenopathy  -     Bronchoscopy  3. Pulmonary nodules/lesions, multiple  Large left lower lobe mass lesion with obstruction of left lower lobe bronchus is associated with mediastinal lymphadenopathy, and numerous nodular opacities throughout both lungs.  This is most consistent with a bronchogenic carcinoma.  The extent of nodular opacities that likely represent metastatic disease, is quite unusual.  The question is raised whether primary source is elsewhere, such as ovarian or renal.  The left lower lobe mass and mediastinal lymph nodes are accessible for biopsy and we will proceed with bronchoscopy with EBUS under general anesthesia to obtain tissue diagnosis.  This process was discussed at length, including risks and benefits.  Ms. Minor Valadez agrees to proceed with this.    Return in about 1 week (around 2024).         Subjective   SUBJECTIVE/OBJECTIVE:  HPI   Nabor Valadez is referred by Dr. Edenilson Munroe for abnormal chest CT scan.  She had an episode of bronchitis in October, with mild cough and increased shortness of breath, and improved with a course of antibiotic and steroid therapy.  She felt reasonably well but in December began having more shortness of breath.  She did not respond particularly to a repeat course of prednisone.  She was seen by her cardiologist for a more routine basis, and was sent to the ED for evaluation and chest imaging because of her dyspnea.  This included a chest x-ray and a CTPA.  No pulmonary emboli were seen.  There were no indications for hospital admission, but follow-up care was arranged.  She has a history of asthma, but this has been easily managed with as needed short acting beta agonist

## 2024-01-22 ENCOUNTER — TELEPHONE (OUTPATIENT)
Dept: PULMONOLOGY | Age: 69
End: 2024-01-22

## 2024-01-22 NOTE — TELEPHONE ENCOUNTER
Patient called to confirm date and time of bronchoscopy, as appt had to be moved from one date to another.  Bronchoscopy is scheduled for Wed. 01/24/2024 at [facility]. Patient was instructed to arrive at Main Entrance at 10 AM for 12 noon procedure, nothing by mouth after midnight, and provide transportation home.  She expressed understanding.

## 2024-01-23 NOTE — PROGRESS NOTES
ENDOSCOPY PREOP INSTRUCTIONS      Please at arrival time given to you from your doctor's office.  Report to the MAIN entrance on [address] and register at the surgery center on the left-hand side of the lobby  You will need your insurance card and photo id and a list of all medications taken on a regular basis. Please include the dose/frequency.    For your procedure:     PLEASE FOLLOW ALL INSTRUCTIONS & PREPS GIVEN TO YOU BY YOUR DOCTOR'S OFFICE.    If you have not received these instructions yet, please call the office immediately. Make sure to read them as soon as received.   If you are taking blood thinners, Aspirin or diabetic medication, make sure to call your doctor as soon as possible for instructions prior to your procedure.  Please dress comfortably and do not wear any lotion, powders or jewelry  If you use oxygen at home, please bring your oxygen tank with you to hospital.  Arrange for someone to be with you and sign you out & drive you home after your procedure.  THIS PERSON MUST WAIT AT HOSPITAL THE ENTIRE TIME.  We allow 2 adult visitors with you in the hospital & masks are strongly recommended.    WOMEN ONLY OF CHILDBEARING AGE: Please make sure to be able to give a urine sample on arrival      If you have further questions, you may contact your Endoscopist's office or Pre Admission Testing staff at [phone]

## 2024-01-24 ENCOUNTER — ANESTHESIA EVENT (OUTPATIENT)
Dept: ENDOSCOPY | Age: 69
End: 2024-01-24
Payer: MEDICARE

## 2024-01-24 ENCOUNTER — ANESTHESIA (OUTPATIENT)
Dept: ENDOSCOPY | Age: 69
End: 2024-01-24
Payer: MEDICARE

## 2024-01-24 ENCOUNTER — HOSPITAL ENCOUNTER (OUTPATIENT)
Age: 69
Setting detail: OUTPATIENT SURGERY
Discharge: HOME | End: 2024-01-24
Attending: INTERNAL MEDICINE | Admitting: INTERNAL MEDICINE
Payer: MEDICARE

## 2024-01-24 VITALS
HEIGHT: 63 IN | DIASTOLIC BLOOD PRESSURE: 68 MMHG | HEART RATE: 80 BPM | TEMPERATURE: 98 F | WEIGHT: 103 LBS | BODY MASS INDEX: 18.25 KG/M2 | SYSTOLIC BLOOD PRESSURE: 148 MMHG | RESPIRATION RATE: 16 BRPM | OXYGEN SATURATION: 93 %

## 2024-01-24 DIAGNOSIS — R59.0 MEDIASTINAL LYMPHADENOPATHY: ICD-10-CM

## 2024-01-24 DIAGNOSIS — R91.8 MASS OF LOWER LOBE OF LEFT LUNG: ICD-10-CM

## 2024-01-24 PROCEDURE — 7100000011 HC PHASE II RECOVERY - ADDTL 15 MIN: Performed by: INTERNAL MEDICINE

## 2024-01-24 PROCEDURE — 88173 CYTOPATH EVAL FNA REPORT: CPT

## 2024-01-24 PROCEDURE — 3700000000 HC ANESTHESIA ATTENDED CARE: Performed by: INTERNAL MEDICINE

## 2024-01-24 PROCEDURE — 3603165200 HC BRNCHSC EBUS GUIDED SAMPL 1/2 NODE STATION/STRUX: Performed by: INTERNAL MEDICINE

## 2024-01-24 PROCEDURE — 88305 TISSUE EXAM BY PATHOLOGIST: CPT

## 2024-01-24 PROCEDURE — 31652 BRONCH EBUS SAMPLNG 1/2 NODE: CPT | Performed by: INTERNAL MEDICINE

## 2024-01-24 PROCEDURE — 2500000003 HC RX 250 WO HCPCS: Performed by: NURSE ANESTHETIST, CERTIFIED REGISTERED

## 2024-01-24 PROCEDURE — 2580000003 HC RX 258: Performed by: NURSE ANESTHETIST, CERTIFIED REGISTERED

## 2024-01-24 PROCEDURE — 31625 BRONCHOSCOPY W/BIOPSY(S): CPT | Performed by: INTERNAL MEDICINE

## 2024-01-24 PROCEDURE — 94761 N-INVAS EAR/PLS OXIMETRY MLT: CPT

## 2024-01-24 PROCEDURE — 88342 IMHCHEM/IMCYTCHM 1ST ANTB: CPT

## 2024-01-24 PROCEDURE — 6360000002 HC RX W HCPCS: Performed by: NURSE ANESTHETIST, CERTIFIED REGISTERED

## 2024-01-24 PROCEDURE — 88341 IMHCHEM/IMCYTCHM EA ADD ANTB: CPT

## 2024-01-24 PROCEDURE — 3700000001 HC ADD 15 MINUTES (ANESTHESIA): Performed by: INTERNAL MEDICINE

## 2024-01-24 PROCEDURE — 2709999900 HC NON-CHARGEABLE SUPPLY: Performed by: INTERNAL MEDICINE

## 2024-01-24 PROCEDURE — 6370000000 HC RX 637 (ALT 250 FOR IP): Performed by: ANESTHESIOLOGY

## 2024-01-24 PROCEDURE — 7100000001 HC PACU RECOVERY - ADDTL 15 MIN: Performed by: INTERNAL MEDICINE

## 2024-01-24 PROCEDURE — 7100000010 HC PHASE II RECOVERY - FIRST 15 MIN: Performed by: INTERNAL MEDICINE

## 2024-01-24 PROCEDURE — 94640 AIRWAY INHALATION TREATMENT: CPT

## 2024-01-24 PROCEDURE — 7100000000 HC PACU RECOVERY - FIRST 15 MIN: Performed by: INTERNAL MEDICINE

## 2024-01-24 PROCEDURE — 6360000002 HC RX W HCPCS: Performed by: ANESTHESIOLOGY

## 2024-01-24 PROCEDURE — 88172 CYTP DX EVAL FNA 1ST EA SITE: CPT

## 2024-01-24 RX ORDER — SODIUM CHLORIDE 0.9 % (FLUSH) 0.9 %
5-40 SYRINGE (ML) INJECTION EVERY 12 HOURS SCHEDULED
Status: DISCONTINUED | OUTPATIENT
Start: 2024-01-24 | End: 2024-01-24 | Stop reason: HOSPADM

## 2024-01-24 RX ORDER — LIDOCAINE HYDROCHLORIDE 20 MG/ML
INJECTION, SOLUTION INFILTRATION; PERINEURAL PRN
Status: DISCONTINUED | OUTPATIENT
Start: 2024-01-24 | End: 2024-01-24 | Stop reason: SDUPTHER

## 2024-01-24 RX ORDER — SODIUM CHLORIDE, SODIUM LACTATE, POTASSIUM CHLORIDE, CALCIUM CHLORIDE 600; 310; 30; 20 MG/100ML; MG/100ML; MG/100ML; MG/100ML
INJECTION, SOLUTION INTRAVENOUS CONTINUOUS
Status: DISCONTINUED | OUTPATIENT
Start: 2024-01-24 | End: 2024-01-24 | Stop reason: HOSPADM

## 2024-01-24 RX ORDER — HYDROMORPHONE HYDROCHLORIDE 1 MG/ML
0.25 INJECTION, SOLUTION INTRAMUSCULAR; INTRAVENOUS; SUBCUTANEOUS EVERY 5 MIN PRN
Status: DISCONTINUED | OUTPATIENT
Start: 2024-01-24 | End: 2024-01-24 | Stop reason: HOSPADM

## 2024-01-24 RX ORDER — PROPOFOL 10 MG/ML
INJECTION, EMULSION INTRAVENOUS PRN
Status: DISCONTINUED | OUTPATIENT
Start: 2024-01-24 | End: 2024-01-24 | Stop reason: SDUPTHER

## 2024-01-24 RX ORDER — SODIUM CHLORIDE, SODIUM LACTATE, POTASSIUM CHLORIDE, CALCIUM CHLORIDE 600; 310; 30; 20 MG/100ML; MG/100ML; MG/100ML; MG/100ML
INJECTION, SOLUTION INTRAVENOUS CONTINUOUS PRN
Status: DISCONTINUED | OUTPATIENT
Start: 2024-01-24 | End: 2024-01-24 | Stop reason: SDUPTHER

## 2024-01-24 RX ORDER — IPRATROPIUM BROMIDE AND ALBUTEROL SULFATE 2.5; .5 MG/3ML; MG/3ML
1 SOLUTION RESPIRATORY (INHALATION) ONCE
Status: COMPLETED | OUTPATIENT
Start: 2024-01-24 | End: 2024-01-24

## 2024-01-24 RX ORDER — ONDANSETRON 2 MG/ML
4 INJECTION INTRAMUSCULAR; INTRAVENOUS ONCE
Status: DISCONTINUED | OUTPATIENT
Start: 2024-01-24 | End: 2024-01-24 | Stop reason: HOSPADM

## 2024-01-24 RX ORDER — SUCCINYLCHOLINE CHLORIDE 20 MG/ML
INJECTION INTRAMUSCULAR; INTRAVENOUS PRN
Status: DISCONTINUED | OUTPATIENT
Start: 2024-01-24 | End: 2024-01-24 | Stop reason: SDUPTHER

## 2024-01-24 RX ORDER — SODIUM CHLORIDE 9 MG/ML
INJECTION, SOLUTION INTRAVENOUS PRN
Status: DISCONTINUED | OUTPATIENT
Start: 2024-01-24 | End: 2024-01-24 | Stop reason: HOSPADM

## 2024-01-24 RX ORDER — ONDANSETRON 2 MG/ML
4 INJECTION INTRAMUSCULAR; INTRAVENOUS
Status: COMPLETED | OUTPATIENT
Start: 2024-01-24 | End: 2024-01-24

## 2024-01-24 RX ORDER — ONDANSETRON 2 MG/ML
INJECTION INTRAMUSCULAR; INTRAVENOUS PRN
Status: DISCONTINUED | OUTPATIENT
Start: 2024-01-24 | End: 2024-01-24 | Stop reason: SDUPTHER

## 2024-01-24 RX ORDER — SODIUM CHLORIDE 0.9 % (FLUSH) 0.9 %
5-40 SYRINGE (ML) INJECTION PRN
Status: DISCONTINUED | OUTPATIENT
Start: 2024-01-24 | End: 2024-01-24 | Stop reason: HOSPADM

## 2024-01-24 RX ADMIN — SODIUM CHLORIDE, SODIUM LACTATE, POTASSIUM CHLORIDE, AND CALCIUM CHLORIDE: .6; .31; .03; .02 INJECTION, SOLUTION INTRAVENOUS at 12:23

## 2024-01-24 RX ADMIN — ONDANSETRON 4 MG: 2 INJECTION INTRAMUSCULAR; INTRAVENOUS at 12:32

## 2024-01-24 RX ADMIN — IPRATROPIUM BROMIDE AND ALBUTEROL SULFATE 1 DOSE: 2.5; .5 SOLUTION RESPIRATORY (INHALATION) at 10:39

## 2024-01-24 RX ADMIN — ONDANSETRON 4 MG: 2 INJECTION INTRAMUSCULAR; INTRAVENOUS at 14:57

## 2024-01-24 RX ADMIN — SUCCINYLCHOLINE CHLORIDE 100 MG: 20 INJECTION, SOLUTION INTRAMUSCULAR; INTRAVENOUS at 12:28

## 2024-01-24 RX ADMIN — PROPOFOL 80 MG: 10 INJECTION, EMULSION INTRAVENOUS at 12:28

## 2024-01-24 RX ADMIN — PHENYLEPHRINE HYDROCHLORIDE 200 MCG: 10 INJECTION, SOLUTION INTRAMUSCULAR; INTRAVENOUS; SUBCUTANEOUS at 13:02

## 2024-01-24 RX ADMIN — LIDOCAINE HYDROCHLORIDE 50 MG: 20 INJECTION, SOLUTION INFILTRATION; PERINEURAL at 12:28

## 2024-01-24 ASSESSMENT — PAIN DESCRIPTION - DESCRIPTORS: DESCRIPTORS: ACHING

## 2024-01-24 ASSESSMENT — PAIN DESCRIPTION - PAIN TYPE: TYPE: ACUTE PAIN

## 2024-01-24 ASSESSMENT — PAIN SCALES - GENERAL
PAINLEVEL_OUTOF10: 6
PAINLEVEL_OUTOF10: 0

## 2024-01-24 ASSESSMENT — PAIN DESCRIPTION - ORIENTATION: ORIENTATION: MID

## 2024-01-24 ASSESSMENT — PAIN DESCRIPTION - LOCATION: LOCATION: HEAD

## 2024-01-24 ASSESSMENT — ENCOUNTER SYMPTOMS: SHORTNESS OF BREATH: 1

## 2024-01-24 ASSESSMENT — PAIN DESCRIPTION - FREQUENCY: FREQUENCY: CONTINUOUS

## 2024-01-24 NOTE — H&P
History and Physical / Pre-Sedation Assessment    Patient:  Nabor Valadez   :   1955     Intended Procedure:    Bronchoscopy with bronchial bx and EBUS     HPI:  Hx of nonresolving cough and congestion.  Chest xray showed LLL mass and multiple nodular opacities    PMHx:   Past Medical History:   Diagnosis Date    Age-related osteoporosis without current pathological fracture 2012    Asthma     Mild, seasonal    Bradycardia     Degenerative arthritis of left knee 2021    History of recurrent UTIs 2021    Parkinson disease (HCC) - diagnosed age 60 2016    Postmenopausal     Urinary incontinence 2016    Bladder leaks coughing laughing etc        Rx:   Prior to Admission medications    Medication Sig Start Date End Date Taking? Authorizing Provider   albuterol sulfate HFA (VENTOLIN HFA) 108 (90 Base) MCG/ACT inhaler Inhale 2 puffs into the lungs every 6 hours as needed for Wheezing 1/3/24 1/2/25  Edenilson Munroe DO   fluticasone (FLONASE) 50 MCG/ACT nasal spray 1 spray by Each Nostril route daily 1/3/24   Edenilson Munroe DO   RYTARY 48. MG CPCR Take 1 capsule by mouth 6 times daily 23   Alise Shaw MD   Riboflavin 400 MG TABS Take 400 mg by mouth daily 3/9/23   Alise Shaw MD   meloxicam (MOBIC) 15 MG tablet Take 1 tablet by mouth daily as needed for Pain 22  Edenilson Munroe DO   ciprofloxacin (CIPRO) 250 MG tablet Take one tablet after intercourse to prevent bladder infection.Take 1 tablet twice a day for three days if an infection occurs. 21   Edenilson Munroe DO   loratadine (CLARITIN) 10 MG tablet Take 1 tablet by mouth daily    Alise Shaw MD   ascorbic acid (VITAMIN C) 500 MG tablet Take 1 tablet by mouth daily    Alise Shaw MD   PRAMIPEXOLE DIHYDROCHLORIDE PO Take by mouth Dr Goins  Patient not taking: Reported on 2024    Alise Shaw MD    gabapentin (NEURONTIN) 300 MG capsule Take 100 mg by mouth 3 times daily.    Provider, MD Alise   Multiple Vitamins-Minerals (MULTIVITAMIN PO) Take  by mouth.    ProviderAlise MD   Calcium Carb-Cholecalciferol (CALCIUM 1000 + D PO) Take  by mouth.    ProviderAlsie, MD        Nurses notes reviewed and agreed.  Medications reviewed  Allergies:   Allergies   Allergen Reactions    Latex Itching    Penicillins Hives    Codeine Nausea And Vomiting       Physical Exam:  Vital Signs: /67   Pulse 68   Temp 97.3 °F (36.3 °C)   Resp 18   Ht 1.6 m (5' 3\")   Wt 46.7 kg (103 lb)   SpO2 98%   BMI 18.25 kg/m²    Airway: Mallampati: I (soft palate, uvula, fauces, tonsillar pillars visible)  Pulmonary: decreased breath sounds L base  Cardiac:Normal  Abdomen:Normal    Pre-Procedure Assessment / Plan:  ASA: Class 2 - A normal healthy patient with mild systemic disease  Level of Sedation Plan:Deep sedation  Post Procedure plan: Return to same level of care    I assessed the patient and find that the patient is in satisfactory condition to proceed with the planned procedure and sedation plan.    I have explained the risk, benefits, and alternatives to the procedure; the patient understands and agrees to proceed.       Kevin Cotton MD  1/24/2024

## 2024-01-24 NOTE — DISCHARGE INSTRUCTIONS
[facility] AMBULATORY PROCEDURE DISCHARGE INSTRUCTIONS    There are potential side effects of anesthesia or sedation you may experience for the first 24 hours.  These side effects include:    Confusion or Memory loss, Dizziness, or Delayed Reaction Times   [x]A responsible person should be with you for the next 24 hours.  Do not operate any vehicles (automobiles, bicycles, motorcycles) or power tools or machinery for 24 hours.  Do not sign any legal documents or make any legal decisions for 24 hours. Do not drink alcohol for 24 hours or while taking narcotic pain medication.      Nausea    [x]Start with light diet and progress to your normal diet as you feel like eating. However, if you experience nausea or repeated episodes of vomiting which persist beyond 12-24 hours, notify your physician.  Once nausea has passed, remember to keep drinking fluids.    Difficulty Passing Urine  [x]Drink extra amounts of fluid today.  Notify your physician if you have not urinated within 8 hours after your procedure or you feel uncomfortable.      Irritated Throat from a Breathing Tube  [x]Drink extra amounts of fluid today.  Lozenges may help.    Muscle Aches  [x]You may experience some generalized body aches as your muscles recover from medications used to relax them during surgery.  These will gradually subside.    MEDICATION INSTRUCTIONS:  []Prescription(S) x     sent with you.  Use as directed.  When taking pain medications, you may experience the side effect of dizziness or drowsiness.  Do not drink alcohol or drive when taking these medications.  []Prescription(S) x          Called to Pharmacy Name and location:    [x]Give the list of your medications to your primary care physician on your next visit. Keep your med list updated and carry it with in case of emergencies.    [] Narcotic pain medications can cause the side effect of significant constipation.  You may want to add a stool softener to your postoperative

## 2024-01-24 NOTE — ANESTHESIA PRE PROCEDURE
results found for: \"COVID19\"        Anesthesia Evaluation  Patient summary reviewed and Nursing notes reviewed  Airway: Mallampati: II  TM distance: >3 FB   Neck ROM: full  Mouth opening: > = 3 FB   Dental: normal exam         Pulmonary:normal exam    (+)   shortness of breath:         asthma:                           ROS comment: Pulmonary nodule   Cardiovascular:Negative CV ROS  Exercise tolerance: good (>4 METS)        ECG reviewed      Echocardiogram reviewed               ROS comment: ECHO 11/22    Normal left ventricle size, wall thickness, and systolic function with an estimated ejection fraction of 55-60%. No regional wall motion abnormalities are seen. Diastolic filling parameters suggests normal diastolic function. Mild mitral valve Mild-moderate tricuspid valve regurgitation present. Estimated pulmonary artery systolic pressure is 34 mmHg assuming a right atrial pressure of 3 mmHg.       Neuro/Psych:   (+) neuromuscular disease: Parkinson's disease            GI/Hepatic/Renal: Neg GI/Hepatic/Renal ROS            Endo/Other: Negative Endo/Other ROS                    Abdominal:             Vascular: negative vascular ROS.         Other Findings:             Anesthesia Plan      general     ASA 3       Induction: intravenous.      Anesthetic plan and risks discussed with patient.      Plan discussed with CRNA.    Attending anesthesiologist reviewed and agrees with Preprocedure content                JAVON PARIKH MD   1/24/2024

## 2024-01-24 NOTE — ANESTHESIA POSTPROCEDURE EVALUATION
Department of Anesthesiology  Postprocedure Note    Patient: Nabor Valadez  MRN: [id number]  YOB: 1955  Date of evaluation: 1/24/2024    Procedure Summary       Date: 01/24/24 Room / Location: [place] [address] / [facility]    Anesthesia Start: 1223 Anesthesia Stop: 1334    Procedure: BRONCHOSCOPY WITH ENDOBRONCHIAL ULTRASOUND MASS OF LOWER LOBE OF LEFT LUNG, MEDIASTINAL LYMPHADENOPATHY, LEFT LOWER LOBE LUNG MASS BIOPSY (Left) Diagnosis:       Mass of lower lobe of left lung      Mediastinal lymphadenopathy      (Mass of lower lobe of left lung [R91.8])      (Mediastinal lymphadenopathy [R59.0])    Surgeons: Kevin Cotton MD Responsible Provider: Placido Jean MD    Anesthesia Type: general ASA Status: 3            Anesthesia Type: No value filed.    Yennifer Phase I: Yennifer Score: 10    Yennifer Phase II: Yennifer Score: 10    Anesthesia Post Evaluation    Patient location during evaluation: PACU  Patient participation: complete - patient participated  Level of consciousness: awake  Airway patency: patent  Nausea & Vomiting: no nausea and no vomiting  Cardiovascular status: blood pressure returned to baseline and hemodynamically stable  Respiratory status: acceptable  Hydration status: euvolemic  Multimodal analgesia pain management approach  Pain management: adequate    No notable events documented.

## 2024-01-24 NOTE — FLOWSHEET NOTE
Patient received from ENDO to PACU #17 post BRONCHOSCOPY WITH ENDOBRONCHIAL ULTRASOUND MASS OF LOWER LOBE OF LEFT LUNG, MEDIASTINAL LYMPHADENOPATHY, LEFT LOWER LOBE LUNG MASS BIOPSY - Left of Dr. Cotton. Placed on PACU monitoring equipment, report given per CRNA and OR RN. Patient with frequent cough and complaints of H/A. Breath sounds rhonchorus, clears some with persistent cough and expectoration of large amount clear to creamy secretions.

## 2024-01-24 NOTE — PROGRESS NOTES
Vitals:    01/24/24 1419   BP: 124/79   Pulse: 80   Resp: 16   Temp: 97.8f   SpO2: 96       No intake or output data in the 24 hours ending 01/24/24 1423    Pain assessment:  none  Pain level: 0    Patient transferred to care of [facility] RN. Taken to [facility] per YVONNE Maria. All belongings w/pt.  at bedside. Resting comfortably.    1/24/2024 2:23 PM

## 2024-01-24 NOTE — PROGRESS NOTES
Ambulatory Surgery/Procedure Discharge Note    Vitals:    01/24/24 1419   BP: (!) 148/68   Pulse: 80   Resp: 16   Temp: 98 °F (36.7 °C)   SpO2: 93%   Yennifer score criteria met for discharge.    In: 240 [P.O.:240]  Out: -     Restroom use offered before discharge.  Yes    Pain assessment:  level of pain (1-10, 10 severe),   Pain Level: 0    Pt and S.O./family states \"ready to go home\". Pt alert and oriented x4. IV removed. Denies pain. States nausea is increasing. Called Dr. White and obtained order for Zofran 4mg IV. Administered per MAR. After 15 minutes, patient reports nausea resolved. Voided prior to discharge. Pt tolerating po intake. Discharge instructions given to pt and family with pt permission. Pt and family verbalized understanding of all instructions. Left with all belongings and discharge instructions.       Patient discharged to home/self care. Patient discharged via wheel chair by transporter to waiting family/S.O.       1/24/2024 2:59 PM

## 2024-01-24 NOTE — BRIEF OP NOTE
Brief Postoperative Note      Patient: Nabor Valadez  YOB: 1955  MRN: [id number]    Date of Procedure: 1/24/2024    Pre-Op Diagnosis: Mass of lower lobe of left lung [R91.8]  Mediastinal lymphadenopathy [R59.0]    Post-Op Diagnosis: Same       Procedure(s):  BRONCHOSCOPY WITH ENDOBRONCHIAL ULTRASOUND MASS OF LOWER LOBE OF LEFT LUNG, MEDIASTINAL LYMPHADENOPATHY, LEFT LOWER LOBE LUNG MASS BIOPSY    Surgeon(s):  Kevin Cotton MD    Anesthesia: General    Airway: Mallampati: I (soft palate, uvula, fauces, tonsillar pillars visible)    ASA: Class 2 - A normal healthy patient with mild systemic disease    Estimated Blood Loss (mL): less than 50     Complications: None    Specimens:   ID Type Source Tests Collected by Time Destination   A : LEFT LOWER LOBE LUNG MASS BX Tissue Lung SURGICAL PATHOLOGY Kevin Cotton MD 1/24/2024 1306        Findings:   LLL bronchus occluded with neoplastic -appearing tissue.  Airway anatomy otherwise normal, with normal appearing mucosa.    Electronically signed by Kevin Cotton MD on 1/24/2024 at 3:33 PM

## 2024-01-25 NOTE — OP NOTE
[address] [address]                                OPERATIVE REPORT    PATIENT NAME: BECCA CABALLERO             :        1955  MED REC NO:   [id number]                          ROOM:  ACCOUNT NO:   [id number]                           ADMIT DATE: 2024  PROVIDER:     Kevin Cotton MD    DATE OF PROCEDURE:  2024    :  Kevin Cotton MD    PROCEDURE:  Bronchoscopy with endobronchial forceps biopsies with EBUS  guided, transbronchial needle aspiration of mediastinal lymph node  stations 7 and 4L.    PREOPERATIVE DIAGNOSIS:  Left lower lobe mass lesion with mediastinal  lymphadenopathy and multiple nodular lung lesions.  Suspect bronchogenic  carcinoma.    POSTOPERATIVE DIAGNOSIS:   Left lower lobe mass lesion with mediastinal  lymphadenopathy and multiple nodular lung lesions.  Suspect bronchogenic  carcinoma.    ASA SCORE:  II.    MALLAMPATI SCORE:  I.    ANESTHESIA:  General.    DESCRIPTION OF PROCEDURE:  The patient self identified and confirmed the  procedure during the time-out process.  General anesthesia was induced.   The patient was intubated with 8.5-mm ETT.  The diagnostic bronchoscope  was passed via adapter through the ETT which is noted to be in good  position at mid trachea level.  The right endobronchial anatomy was  normal to the segmental level.  Bronchial mucosa was mildly atrophic but  otherwise, unremarkable.  At the left bronchial tree, the left lower  lobe bronchus was occluded by fungating neoplastic appearing tissue.   The left upper lobe segments were normal in appearance.  Mucosa was  mildly atrophic.  The bronchoscope was withdrawn.    The EBUS bronchoscope was passed via ETT and lymph node stations 7 and  4L were visualized.  Multiple samples were obtained from these areas.   Rapid on-site cytology exam indicated the presence of neoplastic tissue  in lymph

## 2024-01-26 ENCOUNTER — OFFICE VISIT (OUTPATIENT)
Dept: PULMONOLOGY | Age: 69
End: 2024-01-26

## 2024-01-26 VITALS
SYSTOLIC BLOOD PRESSURE: 128 MMHG | OXYGEN SATURATION: 93 % | HEIGHT: 63 IN | WEIGHT: 108.6 LBS | DIASTOLIC BLOOD PRESSURE: 74 MMHG | BODY MASS INDEX: 19.24 KG/M2 | HEART RATE: 76 BPM

## 2024-01-26 DIAGNOSIS — C78.02 MALIGNANT NEOPLASM METASTATIC TO BOTH LUNGS (HCC): ICD-10-CM

## 2024-01-26 DIAGNOSIS — C34.32 PRIMARY CANCER OF LEFT LOWER LOBE OF LUNG (HCC): Primary | ICD-10-CM

## 2024-01-26 DIAGNOSIS — C78.01 MALIGNANT NEOPLASM METASTATIC TO BOTH LUNGS (HCC): ICD-10-CM

## 2024-01-26 NOTE — PROGRESS NOTES
Naobr Valadez (:  1955) is a 68 y.o. female,Established patient, here for evaluation of the following chief complaint(s):  Follow-up (Bronch f/u)         ASSESSMENT/PLAN:  1. Primary cancer of left lower lobe of lung (HCC)  2. 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  Cytology from mediastinal lymph node aspirates are in process, but MIGUEL ANGEL at time of bronchoscopy indicated malignancy in mediastinal nodes.  Biopsies were not obtained from pulmonary nodules.  PET scan and MRI brain are ordered.  Molecular testing on biopsy tissue is ordered.  She has follow-up with Dr. Adrian Ricks next week to discuss start of therapy    Return if symptoms worsen or fail to improve.         Subjective   SUBJECTIVE/OBJECTIVE:  HPI   Nabor Valadez returns, accompanied by her , to discuss findings from bronchoscopy on 2424.  Biopsies from the left lower lobe demonstrates squamous cell carcinoma.  Although final cytology report is pending, MIGUEL ANGEL at time of bronchoscopy indicated malignancy present in mediastinal lymph nodes.  We discussed the finding of multiple nodules in the lung that are consistent with metastatic disease, as well as an apparent tumor on left adrenal gland.  We discussed the next step in evaluation, with PET scan and MRI brain.  These have been ordered by Dr. Ricks.  Anticipate chemotherapy will be started, with immunotherapy to follow.  She had some questions about treatment plan.  She is ready to get moving on treatment.           Objective   Physical Exam       On this date 2024 I have spent 26 minutes reviewing previous notes, test results and face to face with the patient discussing the diagnosis and importance of compliance with the treatment plan as well as documenting on the day of the visit.      An electronic signature was used to authenticate this note.    --Kevin Cotton MD

## 2024-02-06 ENCOUNTER — HOSPITAL ENCOUNTER (OUTPATIENT)
Dept: MRI IMAGING | Age: 69
Discharge: HOME | End: 2024-02-06
Attending: INTERNAL MEDICINE
Payer: MEDICARE

## 2024-02-06 DIAGNOSIS — R91.8 OTHER NONSPECIFIC ABNORMAL FINDING OF LUNG FIELD: ICD-10-CM

## 2024-02-06 DIAGNOSIS — R59.9 ENLARGED LYMPH NODE: ICD-10-CM

## 2024-02-06 DIAGNOSIS — C79.70 MALIGNANT NEOPLASM METASTATIC TO ADRENAL GLAND, UNSPECIFIED LATERALITY (HCC): ICD-10-CM

## 2024-02-06 PROCEDURE — 70553 MRI BRAIN STEM W/O & W/DYE: CPT

## 2024-02-06 PROCEDURE — 2580000003 HC RX 258: Performed by: INTERNAL MEDICINE

## 2024-02-06 PROCEDURE — A9579 GAD-BASE MR CONTRAST NOS,1ML: HCPCS | Performed by: INTERNAL MEDICINE

## 2024-02-06 PROCEDURE — 6360000004 HC RX CONTRAST MEDICATION: Performed by: INTERNAL MEDICINE

## 2024-02-06 RX ORDER — SODIUM CHLORIDE 0.9 % (FLUSH) 0.9 %
10 SYRINGE (ML) INJECTION PRN
Status: DISCONTINUED | OUTPATIENT
Start: 2024-02-06 | End: 2024-02-07 | Stop reason: HOSPADM

## 2024-02-06 RX ADMIN — GADOTERIDOL 10 ML: 279.3 INJECTION, SOLUTION INTRAVENOUS at 15:40

## 2024-02-06 RX ADMIN — SODIUM CHLORIDE, PRESERVATIVE FREE 10 ML: 5 INJECTION INTRAVENOUS at 15:40

## 2024-02-12 RX ORDER — SODIUM BICARBONATE 42 MG/ML
1.5 INJECTION, SOLUTION INTRAVENOUS ONCE
Status: CANCELLED | OUTPATIENT
Start: 2024-02-12 | End: 2024-02-12

## 2024-02-12 RX ORDER — DEXAMETHASONE SODIUM PHOSPHATE 4 MG/ML
4 INJECTION, SOLUTION INTRA-ARTICULAR; INTRALESIONAL; INTRAMUSCULAR; INTRAVENOUS; SOFT TISSUE ONCE
OUTPATIENT
Start: 2024-02-12 | End: 2024-02-12

## 2024-02-12 RX ORDER — LIDOCAINE HYDROCHLORIDE 10 MG/ML
30 INJECTION, SOLUTION INFILTRATION; PERINEURAL ONCE
Status: CANCELLED | OUTPATIENT
Start: 2024-02-12 | End: 2024-02-12

## 2024-02-12 RX ORDER — 0.9 % SODIUM CHLORIDE 0.9 %
500 INTRAVENOUS SOLUTION INTRAVENOUS ONCE
Status: CANCELLED | OUTPATIENT
Start: 2024-02-12 | End: 2024-02-12

## 2024-02-12 RX ORDER — ONDANSETRON 2 MG/ML
4 INJECTION INTRAMUSCULAR; INTRAVENOUS EVERY 6 HOURS PRN
OUTPATIENT
Start: 2024-02-12

## 2024-02-12 RX ORDER — BUPIVACAINE HYDROCHLORIDE 5 MG/ML
30 INJECTION, SOLUTION EPIDURAL; INTRACAUDAL ONCE
Status: CANCELLED | OUTPATIENT
Start: 2024-02-13 | End: 2024-02-12

## 2024-02-12 RX ORDER — IBUPROFEN 200 MG
TABLET ORAL 2 TIMES DAILY
Status: CANCELLED | OUTPATIENT
Start: 2024-02-12

## 2024-02-12 RX ORDER — ACETAMINOPHEN 325 MG/1
650 TABLET ORAL EVERY 4 HOURS PRN
Status: CANCELLED | OUTPATIENT
Start: 2024-02-12

## 2024-02-12 NOTE — PRE-PROCEDURE INSTRUCTIONS
Attempted to call patient about procedure. No answer. Voicemail left. Told to be here at 0930 for procedure at 1100. NPO after midnight, but can take morning medication with sips of water, office should have told them when and if they should stop any blood thinners. To have a responsible adult be with patient take them home and stay with them afterwards, if they are not admitted to hospital afterwards. And if available bring current list of medications.

## 2024-02-13 ENCOUNTER — HOSPITAL ENCOUNTER (OUTPATIENT)
Dept: INTERVENTIONAL RADIOLOGY/VASCULAR | Age: 69
Discharge: HOME | End: 2024-02-13

## 2024-02-14 ENCOUNTER — APPOINTMENT (OUTPATIENT)
Dept: GENERAL RADIOLOGY | Age: 69
DRG: 640 | End: 2024-02-14
Payer: MEDICARE

## 2024-02-14 ENCOUNTER — HOSPITAL ENCOUNTER (INPATIENT)
Age: 69
LOS: 6 days | Discharge: NURSING, REHAB OR LONG-TERM CARE | DRG: 640 | End: 2024-02-20
Attending: EMERGENCY MEDICINE | Admitting: STUDENT IN AN ORGANIZED HEALTH CARE EDUCATION/TRAINING PROGRAM
Payer: MEDICARE

## 2024-02-14 DIAGNOSIS — C34.90 PRIMARY MALIGNANT NEOPLASM OF LUNG METASTATIC TO OTHER SITE, UNSPECIFIED LATERALITY (HCC): Primary | ICD-10-CM

## 2024-02-14 PROBLEM — R62.7 FAILURE TO THRIVE IN ADULT: Status: ACTIVE | Noted: 2024-02-14

## 2024-02-14 PROBLEM — R53.1 GENERALIZED WEAKNESS: Status: ACTIVE | Noted: 2024-02-14

## 2024-02-14 LAB
ALBUMIN SERPL-MCNC: 3.5 G/DL (ref 3.4–5)
ALP SERPL-CCNC: 102 U/L (ref 40–129)
ALT SERPL-CCNC: <5 U/L (ref 10–40)
ANION GAP SERPL CALCULATED.3IONS-SCNC: 14 MMOL/L (ref 3–16)
AST SERPL-CCNC: 14 U/L (ref 15–37)
BASE EXCESS BLDV CALC-SCNC: 5.9 MMOL/L (ref -2–3)
BASOPHILS # BLD: 0.1 K/UL (ref 0–0.2)
BASOPHILS NFR BLD: 0.2 %
BILIRUB DIRECT SERPL-MCNC: <0.2 MG/DL (ref 0–0.3)
BILIRUB INDIRECT SERPL-MCNC: ABNORMAL MG/DL (ref 0–1)
BILIRUB SERPL-MCNC: 0.3 MG/DL (ref 0–1)
BUN SERPL-MCNC: 19 MG/DL (ref 7–20)
CALCIUM SERPL-MCNC: 9.9 MG/DL (ref 8.3–10.6)
CHLORIDE SERPL-SCNC: 100 MMOL/L (ref 99–110)
CO2 BLDV-SCNC: 32 MMOL/L
CO2 SERPL-SCNC: 24 MMOL/L (ref 21–32)
COHGB MFR BLDV: 1.3 % (ref 0–1.5)
CREAT SERPL-MCNC: <0.5 MG/DL (ref 0.6–1.2)
DEPRECATED RDW RBC AUTO: 15.4 % (ref 12.4–15.4)
DEPRECATED RDW RBC AUTO: 15.6 % (ref 12.4–15.4)
DO-HGB MFR BLDV: 36.8 %
EKG ATRIAL RATE: 63 BPM
EKG DIAGNOSIS: NORMAL
EKG P AXIS: 16 DEGREES
EKG P-R INTERVAL: 86 MS
EKG Q-T INTERVAL: 392 MS
EKG QRS DURATION: 92 MS
EKG QTC CALCULATION (BAZETT): 401 MS
EKG R AXIS: 80 DEGREES
EKG T AXIS: 32 DEGREES
EKG VENTRICULAR RATE: 63 BPM
EOSINOPHIL # BLD: 0 K/UL (ref 0–0.6)
EOSINOPHIL NFR BLD: 0 %
FLUAV RNA RESP QL NAA+PROBE: NOT DETECTED
FLUBV RNA RESP QL NAA+PROBE: NOT DETECTED
GFR SERPLBLD CREATININE-BSD FMLA CKD-EPI: >60 ML/MIN/{1.73_M2}
GLUCOSE SERPL-MCNC: 119 MG/DL (ref 70–99)
HCO3 BLDV-SCNC: 30.9 MMOL/L (ref 24–28)
HCT VFR BLD AUTO: 32.6 % (ref 36–48)
HCT VFR BLD AUTO: 36.4 % (ref 36–48)
HGB BLD-MCNC: 10.3 G/DL (ref 12–16)
HGB BLD-MCNC: 11.5 G/DL (ref 12–16)
INR PPP: 1.24 (ref 0.84–1.16)
LIPASE SERPL-CCNC: 18 U/L (ref 13–60)
LYMPHOCYTES # BLD: 1.2 K/UL (ref 1–5.1)
LYMPHOCYTES NFR BLD: 4.6 %
MCH RBC QN AUTO: 28 PG (ref 26–34)
MCH RBC QN AUTO: 28.1 PG (ref 26–34)
MCHC RBC AUTO-ENTMCNC: 31.6 G/DL (ref 31–36)
MCHC RBC AUTO-ENTMCNC: 31.7 G/DL (ref 31–36)
MCV RBC AUTO: 88.6 FL (ref 80–100)
MCV RBC AUTO: 88.8 FL (ref 80–100)
METHGB MFR BLDV: 0 % (ref 0–1.5)
MONOCYTES # BLD: 1.1 K/UL (ref 0–1.3)
MONOCYTES NFR BLD: 4.2 %
NEUTROPHILS # BLD: 23.5 K/UL (ref 1.7–7.7)
NEUTROPHILS NFR BLD: 91 %
NT-PROBNP SERPL-MCNC: 411 PG/ML (ref 0–124)
PCO2 BLDV: 45.7 MMHG (ref 41–51)
PH BLDV: 7.44 [PH] (ref 7.35–7.45)
PLATELET # BLD AUTO: 317 K/UL (ref 135–450)
PLATELET # BLD AUTO: 345 K/UL (ref 135–450)
PMV BLD AUTO: 6.4 FL (ref 5–10.5)
PMV BLD AUTO: 6.6 FL (ref 5–10.5)
PO2 BLDV: 35.5 MMHG (ref 25–40)
POTASSIUM SERPL-SCNC: 4 MMOL/L (ref 3.5–5.1)
PROCALCITONIN SERPL IA-MCNC: 0.08 NG/ML (ref 0–0.15)
PROT SERPL-MCNC: 6.9 G/DL (ref 6.4–8.2)
PROTHROMBIN TIME: 15.5 SEC (ref 11.5–14.8)
RBC # BLD AUTO: 3.68 M/UL (ref 4–5.2)
RBC # BLD AUTO: 4.11 M/UL (ref 4–5.2)
SAO2 % BLDV: 63 %
SARS-COV-2 RNA RESP QL NAA+PROBE: NOT DETECTED
SODIUM SERPL-SCNC: 138 MMOL/L (ref 136–145)
TROPONIN, HIGH SENSITIVITY: 14 NG/L (ref 0–14)
TROPONIN, HIGH SENSITIVITY: 15 NG/L (ref 0–14)
WBC # BLD AUTO: 22.2 K/UL (ref 4–11)
WBC # BLD AUTO: 25.9 K/UL (ref 4–11)

## 2024-02-14 PROCEDURE — 94640 AIRWAY INHALATION TREATMENT: CPT

## 2024-02-14 PROCEDURE — 80076 HEPATIC FUNCTION PANEL: CPT

## 2024-02-14 PROCEDURE — 87636 SARSCOV2 & INF A&B AMP PRB: CPT

## 2024-02-14 PROCEDURE — 85027 COMPLETE CBC AUTOMATED: CPT

## 2024-02-14 PROCEDURE — 71045 X-RAY EXAM CHEST 1 VIEW: CPT

## 2024-02-14 PROCEDURE — 6360000002 HC RX W HCPCS: Performed by: EMERGENCY MEDICINE

## 2024-02-14 PROCEDURE — 2580000003 HC RX 258: Performed by: STUDENT IN AN ORGANIZED HEALTH CARE EDUCATION/TRAINING PROGRAM

## 2024-02-14 PROCEDURE — 85025 COMPLETE CBC W/AUTO DIFF WBC: CPT

## 2024-02-14 PROCEDURE — 36415 COLL VENOUS BLD VENIPUNCTURE: CPT

## 2024-02-14 PROCEDURE — 1200000000 HC SEMI PRIVATE

## 2024-02-14 PROCEDURE — 83690 ASSAY OF LIPASE: CPT

## 2024-02-14 PROCEDURE — 94761 N-INVAS EAR/PLS OXIMETRY MLT: CPT

## 2024-02-14 PROCEDURE — 6360000002 HC RX W HCPCS: Performed by: STUDENT IN AN ORGANIZED HEALTH CARE EDUCATION/TRAINING PROGRAM

## 2024-02-14 PROCEDURE — 80048 BASIC METABOLIC PNL TOTAL CA: CPT

## 2024-02-14 PROCEDURE — 83880 ASSAY OF NATRIURETIC PEPTIDE: CPT

## 2024-02-14 PROCEDURE — 82803 BLOOD GASES ANY COMBINATION: CPT

## 2024-02-14 PROCEDURE — 84145 PROCALCITONIN (PCT): CPT

## 2024-02-14 PROCEDURE — 84484 ASSAY OF TROPONIN QUANT: CPT

## 2024-02-14 PROCEDURE — 6370000000 HC RX 637 (ALT 250 FOR IP): Performed by: STUDENT IN AN ORGANIZED HEALTH CARE EDUCATION/TRAINING PROGRAM

## 2024-02-14 PROCEDURE — 85610 PROTHROMBIN TIME: CPT

## 2024-02-14 PROCEDURE — 93005 ELECTROCARDIOGRAM TRACING: CPT | Performed by: EMERGENCY MEDICINE

## 2024-02-14 PROCEDURE — 99285 EMERGENCY DEPT VISIT HI MDM: CPT

## 2024-02-14 RX ORDER — SODIUM CHLORIDE 0.9 % (FLUSH) 0.9 %
5-40 SYRINGE (ML) INJECTION PRN
Status: DISCONTINUED | OUTPATIENT
Start: 2024-02-14 | End: 2024-02-20 | Stop reason: HOSPADM

## 2024-02-14 RX ORDER — ONDANSETRON 4 MG/1
4 TABLET, ORALLY DISINTEGRATING ORAL EVERY 8 HOURS PRN
Status: DISCONTINUED | OUTPATIENT
Start: 2024-02-14 | End: 2024-02-20 | Stop reason: HOSPADM

## 2024-02-14 RX ORDER — BUPIVACAINE HYDROCHLORIDE 5 MG/ML
30 INJECTION, SOLUTION EPIDURAL; INTRACAUDAL ONCE
Status: DISCONTINUED | OUTPATIENT
Start: 2024-02-14 | End: 2024-02-20 | Stop reason: HOSPADM

## 2024-02-14 RX ORDER — SODIUM BICARBONATE 42 MG/ML
1.5 INJECTION, SOLUTION INTRAVENOUS ONCE
Status: DISCONTINUED | OUTPATIENT
Start: 2024-02-14 | End: 2024-02-20 | Stop reason: HOSPADM

## 2024-02-14 RX ORDER — ACETAMINOPHEN 325 MG/1
650 TABLET ORAL EVERY 4 HOURS PRN
Status: DISCONTINUED | OUTPATIENT
Start: 2024-02-14 | End: 2024-02-20 | Stop reason: HOSPADM

## 2024-02-14 RX ORDER — BENZONATATE 100 MG/1
100 CAPSULE ORAL 3 TIMES DAILY
Status: CANCELLED | OUTPATIENT
Start: 2024-02-14

## 2024-02-14 RX ORDER — ALBUTEROL SULFATE 2.5 MG/3ML
2.5 SOLUTION RESPIRATORY (INHALATION) EVERY 6 HOURS PRN
Status: DISCONTINUED | OUTPATIENT
Start: 2024-02-14 | End: 2024-02-14

## 2024-02-14 RX ORDER — SODIUM CHLORIDE 9 MG/ML
INJECTION, SOLUTION INTRAVENOUS CONTINUOUS
Status: ACTIVE | OUTPATIENT
Start: 2024-02-14 | End: 2024-02-15

## 2024-02-14 RX ORDER — LANOLIN ALCOHOL/MO/W.PET/CERES
6 CREAM (GRAM) TOPICAL NIGHTLY
Status: DISCONTINUED | OUTPATIENT
Start: 2024-02-14 | End: 2024-02-18

## 2024-02-14 RX ORDER — SODIUM CHLORIDE 0.9 % (FLUSH) 0.9 %
5-40 SYRINGE (ML) INJECTION EVERY 12 HOURS SCHEDULED
Status: DISCONTINUED | OUTPATIENT
Start: 2024-02-14 | End: 2024-02-20 | Stop reason: HOSPADM

## 2024-02-14 RX ORDER — ALBUTEROL SULFATE 2.5 MG/3ML
2.5 SOLUTION RESPIRATORY (INHALATION)
Status: DISCONTINUED | OUTPATIENT
Start: 2024-02-14 | End: 2024-02-17

## 2024-02-14 RX ORDER — GUAIFENESIN/DEXTROMETHORPHAN 100-10MG/5
5 SYRUP ORAL EVERY 4 HOURS PRN
Status: DISCONTINUED | OUTPATIENT
Start: 2024-02-14 | End: 2024-02-20 | Stop reason: HOSPADM

## 2024-02-14 RX ORDER — BENZONATATE 100 MG/1
100 CAPSULE ORAL ONCE
Status: DISCONTINUED | OUTPATIENT
Start: 2024-02-14 | End: 2024-02-14

## 2024-02-14 RX ORDER — ACETAMINOPHEN 325 MG/1
650 TABLET ORAL EVERY 6 HOURS PRN
Status: DISCONTINUED | OUTPATIENT
Start: 2024-02-14 | End: 2024-02-20 | Stop reason: HOSPADM

## 2024-02-14 RX ORDER — SODIUM CHLORIDE 9 MG/ML
INJECTION, SOLUTION INTRAVENOUS PRN
Status: DISCONTINUED | OUTPATIENT
Start: 2024-02-14 | End: 2024-02-20 | Stop reason: HOSPADM

## 2024-02-14 RX ORDER — DEXAMETHASONE 4 MG/1
4 TABLET ORAL EVERY 12 HOURS SCHEDULED
Status: DISCONTINUED | OUTPATIENT
Start: 2024-02-14 | End: 2024-02-20 | Stop reason: HOSPADM

## 2024-02-14 RX ORDER — PANTOPRAZOLE SODIUM 40 MG/1
40 TABLET, DELAYED RELEASE ORAL
Status: DISCONTINUED | OUTPATIENT
Start: 2024-02-15 | End: 2024-02-20 | Stop reason: HOSPADM

## 2024-02-14 RX ORDER — FLUTICASONE PROPIONATE 50 MCG
1 SPRAY, SUSPENSION (ML) NASAL DAILY
Status: DISCONTINUED | OUTPATIENT
Start: 2024-02-15 | End: 2024-02-20 | Stop reason: HOSPADM

## 2024-02-14 RX ORDER — KETOROLAC TROMETHAMINE 30 MG/ML
15 INJECTION, SOLUTION INTRAMUSCULAR; INTRAVENOUS ONCE
Status: DISCONTINUED | OUTPATIENT
Start: 2024-02-14 | End: 2024-02-14

## 2024-02-14 RX ORDER — GABAPENTIN 100 MG/1
100 CAPSULE ORAL 3 TIMES DAILY
Status: DISCONTINUED | OUTPATIENT
Start: 2024-02-14 | End: 2024-02-20 | Stop reason: HOSPADM

## 2024-02-14 RX ORDER — ACETAMINOPHEN 650 MG/1
650 SUPPOSITORY RECTAL EVERY 6 HOURS PRN
Status: DISCONTINUED | OUTPATIENT
Start: 2024-02-14 | End: 2024-02-20 | Stop reason: HOSPADM

## 2024-02-14 RX ORDER — POLYETHYLENE GLYCOL 3350 17 G/17G
17 POWDER, FOR SOLUTION ORAL DAILY PRN
Status: DISCONTINUED | OUTPATIENT
Start: 2024-02-14 | End: 2024-02-20 | Stop reason: HOSPADM

## 2024-02-14 RX ORDER — ENOXAPARIN SODIUM 100 MG/ML
30 INJECTION SUBCUTANEOUS DAILY
Status: DISCONTINUED | OUTPATIENT
Start: 2024-02-14 | End: 2024-02-20 | Stop reason: HOSPADM

## 2024-02-14 RX ORDER — ONDANSETRON 2 MG/ML
4 INJECTION INTRAMUSCULAR; INTRAVENOUS EVERY 6 HOURS PRN
Status: DISCONTINUED | OUTPATIENT
Start: 2024-02-14 | End: 2024-02-20 | Stop reason: HOSPADM

## 2024-02-14 RX ORDER — 0.9 % SODIUM CHLORIDE 0.9 %
500 INTRAVENOUS SOLUTION INTRAVENOUS ONCE
Status: DISCONTINUED | OUTPATIENT
Start: 2024-02-14 | End: 2024-02-20 | Stop reason: HOSPADM

## 2024-02-14 RX ORDER — IBUPROFEN 200 MG
TABLET ORAL 2 TIMES DAILY
Status: DISCONTINUED | OUTPATIENT
Start: 2024-02-14 | End: 2024-02-19

## 2024-02-14 RX ORDER — LIDOCAINE HYDROCHLORIDE 10 MG/ML
30 INJECTION, SOLUTION INFILTRATION; PERINEURAL ONCE
Status: DISCONTINUED | OUTPATIENT
Start: 2024-02-14 | End: 2024-02-20 | Stop reason: HOSPADM

## 2024-02-14 RX ORDER — CALCIUM CARBONATE 500 MG/1
500 TABLET, CHEWABLE ORAL 3 TIMES DAILY PRN
Status: DISCONTINUED | OUTPATIENT
Start: 2024-02-14 | End: 2024-02-20 | Stop reason: HOSPADM

## 2024-02-14 RX ORDER — ALBUTEROL SULFATE 2.5 MG/3ML
2.5 SOLUTION RESPIRATORY (INHALATION) EVERY 6 HOURS PRN
Status: DISCONTINUED | OUTPATIENT
Start: 2024-02-14 | End: 2024-02-20 | Stop reason: HOSPADM

## 2024-02-14 RX ADMIN — GUAIFENESIN SYRUP AND DEXTROMETHORPHAN 5 ML: 100; 10 SYRUP ORAL at 20:41

## 2024-02-14 RX ADMIN — ALBUTEROL SULFATE 2.5 MG: 2.5 SOLUTION RESPIRATORY (INHALATION) at 11:32

## 2024-02-14 RX ADMIN — Medication 6 MG: at 20:46

## 2024-02-14 RX ADMIN — ENOXAPARIN SODIUM 30 MG: 100 INJECTION SUBCUTANEOUS at 18:11

## 2024-02-14 RX ADMIN — DEXAMETHASONE 4 MG: 4 TABLET ORAL at 20:46

## 2024-02-14 RX ADMIN — GABAPENTIN 100 MG: 100 CAPSULE ORAL at 20:46

## 2024-02-14 RX ADMIN — GUAIFENESIN SYRUP AND DEXTROMETHORPHAN 5 ML: 100; 10 SYRUP ORAL at 15:41

## 2024-02-14 RX ADMIN — SODIUM CHLORIDE: 9 INJECTION, SOLUTION INTRAVENOUS at 18:12

## 2024-02-14 ASSESSMENT — PAIN SCALES - GENERAL
PAINLEVEL_OUTOF10: 0
PAINLEVEL_OUTOF10: 0

## 2024-02-14 ASSESSMENT — PAIN - FUNCTIONAL ASSESSMENT: PAIN_FUNCTIONAL_ASSESSMENT: 0-10

## 2024-02-14 NOTE — H&P
Date/Time    TSH 1.76 08/29/2016 07:57 AM     Troponin: No results found for: \"TROPONINT\"  Lactic Acid: No results for input(s): \"LACTA\" in the last 72 hours.  BNP:   Recent Labs     02/14/24  0902   PROBNP 411*     UA:  Lab Results   Component Value Date/Time    COLORU pale yellow 10/31/2011 11:41 AM    PHUR 6.0 10/31/2011 11:41 AM    CLARITYU clear 10/31/2011 11:41 AM    SPECGRAV 1.015 10/31/2011 11:41 AM    LEUKOCYTESUR neg 10/31/2011 11:41 AM    BILIRUBINUR neg 10/31/2011 11:41 AM    BLOODU neg 10/31/2011 11:41 AM    GLUCOSEU neg 10/31/2011 11:41 AM    KETUA neg 10/31/2011 11:41 AM     Urine Cultures: No results found for: \"LABURIN\"  Blood Cultures: No results found for: \"BC\"  No results found for: \"BLOODCULT2\"  Organism: No results found for: \"ORG\"    Imaging/Diagnostics Last 24 Hours   XR CHEST PORTABLE    Result Date: 2/14/2024  Chest History: Weakness Number of views: 1     Extensive multifocal pulmonary nodules again noted. Left basilar pleural-parenchymal consolidation with obscuration of the left hemidiaphragm similar to chest CT of 1/12/2024. Top normal heart size.         Electronically signed by Mary Lou Gong MD on 2/14/2024 at 3:01 PM

## 2024-02-14 NOTE — ED NOTES
normal limits   TROPONIN - Abnormal; Notable for the following components:    Troponin, High Sensitivity 15 (*)     All other components within normal limits   BRAIN NATRIURETIC PEPTIDE - Abnormal; Notable for the following components:    Pro- (*)     All other components within normal limits   HEPATIC FUNCTION PANEL - Abnormal; Notable for the following components:    ALT <5 (*)     AST 14 (*)     All other components within normal limits     Critical values: no     Abnormal Assessment Findings:     Background  History:   Past Medical History:   Diagnosis Date    Age-related osteoporosis without current pathological fracture 03/20/2012    Asthma     Mild, seasonal    Bradycardia     Degenerative arthritis of left knee 11/01/2021    History of recurrent UTIs 05/04/2021    Lung cancer (HCC)     Parkinson disease (HCC) - diagnosed age 60 09/12/2016    Postmenopausal     Urinary incontinence 2016    Bladder leaks coughing laughing etc       Assessment    Vitals/MEWS: MEWS Score: 2  Level of Consciousness: Alert (0)   Vitals:    02/14/24 1100 02/14/24 1132 02/14/24 1230 02/14/24 1445   BP: (!) 181/92 (!) 181/100 (!) 148/93 (!) 169/95   Pulse: 65 73  83   Resp: 30 14  20   Temp:       TempSrc:       SpO2: 95% 95% 96% 94%   Weight:       Height:         FiO2 (%):   O2 Flow Rate: O2 Device: None (Room air)    Cardiac Rhythm:    Pain Assessment: [] Verbal [] Hinojosa Baker Scale  Pain Scale: Pain Assessment  Pain Assessment: 0-10  Pain Level: 0  Last documented pain score (0-10 scale) Pain Level: 0  Last documented pain medication administered:   Mental Status:   Orientation Level:    NIH Score:    C-SSRS: Risk of Suicide: No Risk  Bedside swallow:    [place] Coma Scale (GCS): Larry Coma Scale  Eye Opening: Spontaneous  Best Verbal Response: Oriented  Best Motor Response: Obeys commands  Larry Coma Scale Score: 15  Active LDA's:    PO Status: Regular  Pertinent or High Risk Medications/Drips: no   If Yes, please

## 2024-02-14 NOTE — ED PROVIDER NOTES
mother; Cancer in her mother; Colon Cancer in her mother; Heart Attack in her father; High Blood Pressure in her father; Stroke in her sister.  She reports that she has never smoked. She has never used smokeless tobacco. She reports that she does not currently use alcohol. She reports that she does not use drugs.    Medications     Previous Medications    ALBUTEROL SULFATE HFA (VENTOLIN HFA) 108 (90 BASE) MCG/ACT INHALER    Inhale 2 puffs into the lungs every 6 hours as needed for Wheezing    ASCORBIC ACID (VITAMIN C) 500 MG TABLET    Take 1 tablet by mouth daily    CALCIUM CARB-CHOLECALCIFEROL (CALCIUM 1000 + D PO)    Take  by mouth.    CIPROFLOXACIN (CIPRO) 250 MG TABLET    Take one tablet after intercourse to prevent bladder infection.Take 1 tablet twice a day for three days if an infection occurs.    FLUTICASONE (FLONASE) 50 MCG/ACT NASAL SPRAY    1 spray by Each Nostril route daily    GABAPENTIN (NEURONTIN) 300 MG CAPSULE    Take 100 mg by mouth 3 times daily.    LORATADINE (CLARITIN) 10 MG TABLET    Take 1 tablet by mouth daily    MELOXICAM (MOBIC) 15 MG TABLET    Take 1 tablet by mouth daily as needed for Pain    MULTIPLE VITAMINS-MINERALS (MULTIVITAMIN PO)    Take  by mouth.    PRAMIPEXOLE DIHYDROCHLORIDE PO    Take by mouth Dr Goins    RIBOFLAVIN 400 MG TABS    Take 400 mg by mouth daily    RYTARY 48. MG CPCR    Take 1 capsule by mouth 6 times daily       Allergies     She is allergic to latex, penicillins, and codeine.    Physical Exam     INITIAL VITALS: BP: (!) 192/99, Temp: 98.1 °F (36.7 °C), Pulse: 63, Respirations: 22, SpO2: 96 %   Physical Exam  Constitutional:       Appearance: She is ill-appearing.      Comments: Very thin   HENT:      Head: Atraumatic.      Mouth/Throat:      Mouth: Mucous membranes are dry.   Cardiovascular:      Rate and Rhythm: Normal rate.   Pulmonary:      Breath sounds: No stridor. No wheezing.   Abdominal:      Tenderness: There is no abdominal tenderness.

## 2024-02-15 PROBLEM — E43 SEVERE MALNUTRITION (HCC): Chronic | Status: ACTIVE | Noted: 2024-02-15

## 2024-02-15 PROBLEM — C79.31 METASTASIS TO BRAIN (HCC): Status: ACTIVE | Noted: 2024-02-15

## 2024-02-15 LAB
ANION GAP SERPL CALCULATED.3IONS-SCNC: 12 MMOL/L (ref 3–16)
BUN SERPL-MCNC: 17 MG/DL (ref 7–20)
CALCIUM SERPL-MCNC: 9.3 MG/DL (ref 8.3–10.6)
CHLORIDE SERPL-SCNC: 101 MMOL/L (ref 99–110)
CO2 SERPL-SCNC: 25 MMOL/L (ref 21–32)
CREAT SERPL-MCNC: <0.5 MG/DL (ref 0.6–1.2)
DEPRECATED RDW RBC AUTO: 15.3 % (ref 12.4–15.4)
FERRITIN SERPL IA-MCNC: 414.9 NG/ML (ref 15–150)
GFR SERPLBLD CREATININE-BSD FMLA CKD-EPI: >60 ML/MIN/{1.73_M2}
GLUCOSE SERPL-MCNC: 126 MG/DL (ref 70–99)
HCT VFR BLD AUTO: 33.4 % (ref 36–48)
HGB BLD-MCNC: 10.6 G/DL (ref 12–16)
IRON SATN MFR SERPL: 13 % (ref 15–50)
IRON SERPL-MCNC: 19 UG/DL (ref 37–145)
MCH RBC QN AUTO: 28 PG (ref 26–34)
MCHC RBC AUTO-ENTMCNC: 31.8 G/DL (ref 31–36)
MCV RBC AUTO: 88 FL (ref 80–100)
PLATELET # BLD AUTO: 300 K/UL (ref 135–450)
PMV BLD AUTO: 6.8 FL (ref 5–10.5)
POTASSIUM SERPL-SCNC: 3.6 MMOL/L (ref 3.5–5.1)
RBC # BLD AUTO: 3.8 M/UL (ref 4–5.2)
SODIUM SERPL-SCNC: 138 MMOL/L (ref 136–145)
TIBC SERPL-MCNC: 149 UG/DL (ref 260–445)
WBC # BLD AUTO: 19.7 K/UL (ref 4–11)

## 2024-02-15 PROCEDURE — 6370000000 HC RX 637 (ALT 250 FOR IP): Performed by: NEUROLOGICAL SURGERY

## 2024-02-15 PROCEDURE — 97116 GAIT TRAINING THERAPY: CPT

## 2024-02-15 PROCEDURE — 94640 AIRWAY INHALATION TREATMENT: CPT

## 2024-02-15 PROCEDURE — 6360000002 HC RX W HCPCS: Performed by: STUDENT IN AN ORGANIZED HEALTH CARE EDUCATION/TRAINING PROGRAM

## 2024-02-15 PROCEDURE — 6360000002 HC RX W HCPCS: Performed by: NURSE PRACTITIONER

## 2024-02-15 PROCEDURE — 82746 ASSAY OF FOLIC ACID SERUM: CPT

## 2024-02-15 PROCEDURE — 97530 THERAPEUTIC ACTIVITIES: CPT

## 2024-02-15 PROCEDURE — 85027 COMPLETE CBC AUTOMATED: CPT

## 2024-02-15 PROCEDURE — 6370000000 HC RX 637 (ALT 250 FOR IP): Performed by: STUDENT IN AN ORGANIZED HEALTH CARE EDUCATION/TRAINING PROGRAM

## 2024-02-15 PROCEDURE — 97162 PT EVAL MOD COMPLEX 30 MIN: CPT

## 2024-02-15 PROCEDURE — 1200000000 HC SEMI PRIVATE

## 2024-02-15 PROCEDURE — 92610 EVALUATE SWALLOWING FUNCTION: CPT

## 2024-02-15 PROCEDURE — 2580000003 HC RX 258: Performed by: STUDENT IN AN ORGANIZED HEALTH CARE EDUCATION/TRAINING PROGRAM

## 2024-02-15 PROCEDURE — 82607 VITAMIN B-12: CPT

## 2024-02-15 PROCEDURE — 83540 ASSAY OF IRON: CPT

## 2024-02-15 PROCEDURE — 97166 OT EVAL MOD COMPLEX 45 MIN: CPT

## 2024-02-15 PROCEDURE — 82728 ASSAY OF FERRITIN: CPT

## 2024-02-15 PROCEDURE — 80048 BASIC METABOLIC PNL TOTAL CA: CPT

## 2024-02-15 PROCEDURE — 97535 SELF CARE MNGMENT TRAINING: CPT

## 2024-02-15 PROCEDURE — 99221 1ST HOSP IP/OBS SF/LOW 40: CPT | Performed by: NURSE PRACTITIONER

## 2024-02-15 PROCEDURE — 36415 COLL VENOUS BLD VENIPUNCTURE: CPT

## 2024-02-15 PROCEDURE — 83550 IRON BINDING TEST: CPT

## 2024-02-15 RX ADMIN — SODIUM CHLORIDE: 9 INJECTION, SOLUTION INTRAVENOUS at 06:35

## 2024-02-15 RX ADMIN — DEXAMETHASONE 4 MG: 4 TABLET ORAL at 08:17

## 2024-02-15 RX ADMIN — BACITRACIN, NEOMYCIN, POLYMYXIN B 1 G: 400; 3.5; 5 OINTMENT TOPICAL at 08:16

## 2024-02-15 RX ADMIN — DEXAMETHASONE 4 MG: 4 TABLET ORAL at 20:00

## 2024-02-15 RX ADMIN — ENOXAPARIN SODIUM 30 MG: 100 INJECTION SUBCUTANEOUS at 08:16

## 2024-02-15 RX ADMIN — Medication 6 MG: at 20:00

## 2024-02-15 RX ADMIN — ALBUTEROL SULFATE 2.5 MG: 2.5 SOLUTION RESPIRATORY (INHALATION) at 21:07

## 2024-02-15 RX ADMIN — ALBUTEROL SULFATE 2.5 MG: 2.5 SOLUTION RESPIRATORY (INHALATION) at 11:27

## 2024-02-15 RX ADMIN — GABAPENTIN 100 MG: 100 CAPSULE ORAL at 14:13

## 2024-02-15 RX ADMIN — GABAPENTIN 100 MG: 100 CAPSULE ORAL at 08:17

## 2024-02-15 RX ADMIN — GABAPENTIN 100 MG: 100 CAPSULE ORAL at 20:00

## 2024-02-15 RX ADMIN — PANTOPRAZOLE SODIUM 40 MG: 40 TABLET, DELAYED RELEASE ORAL at 06:32

## 2024-02-15 RX ADMIN — SODIUM CHLORIDE, PRESERVATIVE FREE 10 ML: 5 INJECTION INTRAVENOUS at 08:17

## 2024-02-16 LAB
ANION GAP SERPL CALCULATED.3IONS-SCNC: 12 MMOL/L (ref 3–16)
BUN SERPL-MCNC: 16 MG/DL (ref 7–20)
CALCIUM SERPL-MCNC: 8.9 MG/DL (ref 8.3–10.6)
CHLORIDE SERPL-SCNC: 103 MMOL/L (ref 99–110)
CO2 SERPL-SCNC: 24 MMOL/L (ref 21–32)
CREAT SERPL-MCNC: <0.5 MG/DL (ref 0.6–1.2)
DEPRECATED RDW RBC AUTO: 15.5 % (ref 12.4–15.4)
FOLATE SERPL-MCNC: 5.01 NG/ML (ref 4.78–24.2)
GFR SERPLBLD CREATININE-BSD FMLA CKD-EPI: >60 ML/MIN/{1.73_M2}
GLUCOSE SERPL-MCNC: 142 MG/DL (ref 70–99)
HCT VFR BLD AUTO: 32.9 % (ref 36–48)
HGB BLD-MCNC: 10.4 G/DL (ref 12–16)
MCH RBC QN AUTO: 27.8 PG (ref 26–34)
MCHC RBC AUTO-ENTMCNC: 31.6 G/DL (ref 31–36)
MCV RBC AUTO: 88.1 FL (ref 80–100)
PLATELET # BLD AUTO: 299 K/UL (ref 135–450)
PMV BLD AUTO: 6.8 FL (ref 5–10.5)
POTASSIUM SERPL-SCNC: 3.7 MMOL/L (ref 3.5–5.1)
RBC # BLD AUTO: 3.74 M/UL (ref 4–5.2)
SODIUM SERPL-SCNC: 139 MMOL/L (ref 136–145)
VIT B12 SERPL-MCNC: >2000 PG/ML (ref 211–911)
WBC # BLD AUTO: 21.2 K/UL (ref 4–11)

## 2024-02-16 PROCEDURE — 1200000000 HC SEMI PRIVATE

## 2024-02-16 PROCEDURE — 6370000000 HC RX 637 (ALT 250 FOR IP): Performed by: STUDENT IN AN ORGANIZED HEALTH CARE EDUCATION/TRAINING PROGRAM

## 2024-02-16 PROCEDURE — 97116 GAIT TRAINING THERAPY: CPT

## 2024-02-16 PROCEDURE — 94640 AIRWAY INHALATION TREATMENT: CPT

## 2024-02-16 PROCEDURE — 97535 SELF CARE MNGMENT TRAINING: CPT

## 2024-02-16 PROCEDURE — 97530 THERAPEUTIC ACTIVITIES: CPT

## 2024-02-16 PROCEDURE — 80048 BASIC METABOLIC PNL TOTAL CA: CPT

## 2024-02-16 PROCEDURE — 85027 COMPLETE CBC AUTOMATED: CPT

## 2024-02-16 PROCEDURE — 36415 COLL VENOUS BLD VENIPUNCTURE: CPT

## 2024-02-16 PROCEDURE — 6360000002 HC RX W HCPCS: Performed by: NURSE PRACTITIONER

## 2024-02-16 PROCEDURE — 2580000003 HC RX 258: Performed by: STUDENT IN AN ORGANIZED HEALTH CARE EDUCATION/TRAINING PROGRAM

## 2024-02-16 PROCEDURE — 6360000002 HC RX W HCPCS: Performed by: STUDENT IN AN ORGANIZED HEALTH CARE EDUCATION/TRAINING PROGRAM

## 2024-02-16 RX ADMIN — DEXAMETHASONE 4 MG: 4 TABLET ORAL at 07:51

## 2024-02-16 RX ADMIN — GABAPENTIN 100 MG: 100 CAPSULE ORAL at 07:51

## 2024-02-16 RX ADMIN — GUAIFENESIN SYRUP AND DEXTROMETHORPHAN 5 ML: 100; 10 SYRUP ORAL at 15:46

## 2024-02-16 RX ADMIN — ALBUTEROL SULFATE 2.5 MG: 2.5 SOLUTION RESPIRATORY (INHALATION) at 20:43

## 2024-02-16 RX ADMIN — DEXAMETHASONE 4 MG: 4 TABLET ORAL at 20:48

## 2024-02-16 RX ADMIN — PANTOPRAZOLE SODIUM 40 MG: 40 TABLET, DELAYED RELEASE ORAL at 05:42

## 2024-02-16 RX ADMIN — GABAPENTIN 100 MG: 100 CAPSULE ORAL at 20:48

## 2024-02-16 RX ADMIN — POTASSIUM BICARBONATE 40 MEQ: 782 TABLET, EFFERVESCENT ORAL at 20:48

## 2024-02-16 RX ADMIN — SODIUM CHLORIDE, PRESERVATIVE FREE 10 ML: 5 INJECTION INTRAVENOUS at 07:52

## 2024-02-16 RX ADMIN — ENOXAPARIN SODIUM 30 MG: 100 INJECTION SUBCUTANEOUS at 07:51

## 2024-02-16 RX ADMIN — GABAPENTIN 100 MG: 100 CAPSULE ORAL at 15:46

## 2024-02-16 NOTE — CARE COORDINATION
Case Management Assessment  Initial Evaluation    Date/Time of Evaluation: 2/16/2024 4:26 PM  Assessment Completed by: SHER Camacho, DARIW    If patient is discharged prior to next notation, then this note serves as note for discharge by case management.    Patient Name: Nabor Valadez                   YOB: 1955  Diagnosis: Failure to thrive in adult [R62.7]  Primary malignant neoplasm of lung metastatic to other site, unspecified laterality (HCC) [C34.90]                   Date / Time: 2/14/2024  8:32 AM    Patient Admission Status: Inpatient   Readmission Risk (Low < 19, Mod (19-27), High > 27): Readmission Risk Score: 12.6    Current PCP: Edenilson Munroe, DO  PCP verified by CM? No    Chart Reviewed: Yes      History Provided by: Medical Record, Patient  Patient Orientation: Alert and Oriented    Patient Cognition: Alert    Hospitalization in the last 30 days (Readmission):  No    If yes, Readmission Assessment in CM Navigator will be completed.    Advance Directives:      Code Status: Full Code   Patient's Primary Decision Maker is: Legal Next of Kin    Primary Decision Maker: Marito Valadez C - Spouse - [phone]    Discharge Planning:    Patient lives with: Spouse/Significant Other Type of Home: House  Primary Care Giver: Self  Patient Support Systems include: Spouse/Significant Other, Family Members, Friends/Neighbors   Current Financial resources: Medicare  Current community resources:    Current services prior to admission: Transitional Care            Current DME:              Type of Home Care services:  Meals on Wheels    ADLS  Prior functional level: Independent in ADLs/IADLs  Current functional level: Assistance with the following:, Mobility    PT AM-PAC: 15 /24  OT AM-PAC: 17 /24    Family can provide assistance at DC: Yes  Would you like Case Management to discuss the discharge plan with any other family members/significant others, and if so, who? No  Plans to

## 2024-02-17 LAB
ANION GAP SERPL CALCULATED.3IONS-SCNC: 14 MMOL/L (ref 3–16)
BUN SERPL-MCNC: 18 MG/DL (ref 7–20)
CALCIUM SERPL-MCNC: 9.3 MG/DL (ref 8.3–10.6)
CHLORIDE SERPL-SCNC: 102 MMOL/L (ref 99–110)
CO2 SERPL-SCNC: 23 MMOL/L (ref 21–32)
CREAT SERPL-MCNC: <0.5 MG/DL (ref 0.6–1.2)
DEPRECATED RDW RBC AUTO: 15.9 % (ref 12.4–15.4)
GFR SERPLBLD CREATININE-BSD FMLA CKD-EPI: >60 ML/MIN/{1.73_M2}
GLUCOSE SERPL-MCNC: 136 MG/DL (ref 70–99)
HCT VFR BLD AUTO: 34.7 % (ref 36–48)
HGB BLD-MCNC: 11 G/DL (ref 12–16)
MCH RBC QN AUTO: 28 PG (ref 26–34)
MCHC RBC AUTO-ENTMCNC: 31.7 G/DL (ref 31–36)
MCV RBC AUTO: 88.4 FL (ref 80–100)
PLATELET # BLD AUTO: 317 K/UL (ref 135–450)
PMV BLD AUTO: 7 FL (ref 5–10.5)
POTASSIUM SERPL-SCNC: 4 MMOL/L (ref 3.5–5.1)
RBC # BLD AUTO: 3.93 M/UL (ref 4–5.2)
SODIUM SERPL-SCNC: 139 MMOL/L (ref 136–145)
WBC # BLD AUTO: 20.6 K/UL (ref 4–11)

## 2024-02-17 PROCEDURE — 80048 BASIC METABOLIC PNL TOTAL CA: CPT

## 2024-02-17 PROCEDURE — 2580000003 HC RX 258: Performed by: STUDENT IN AN ORGANIZED HEALTH CARE EDUCATION/TRAINING PROGRAM

## 2024-02-17 PROCEDURE — 81001 URINALYSIS AUTO W/SCOPE: CPT

## 2024-02-17 PROCEDURE — 1200000000 HC SEMI PRIVATE

## 2024-02-17 PROCEDURE — 85027 COMPLETE CBC AUTOMATED: CPT

## 2024-02-17 PROCEDURE — 36415 COLL VENOUS BLD VENIPUNCTURE: CPT

## 2024-02-17 PROCEDURE — 6360000002 HC RX W HCPCS: Performed by: STUDENT IN AN ORGANIZED HEALTH CARE EDUCATION/TRAINING PROGRAM

## 2024-02-17 PROCEDURE — 6370000000 HC RX 637 (ALT 250 FOR IP): Performed by: STUDENT IN AN ORGANIZED HEALTH CARE EDUCATION/TRAINING PROGRAM

## 2024-02-17 RX ORDER — LOSARTAN POTASSIUM 25 MG/1
25 TABLET ORAL DAILY
Status: DISCONTINUED | OUTPATIENT
Start: 2024-02-17 | End: 2024-02-18

## 2024-02-17 RX ADMIN — PANTOPRAZOLE SODIUM 40 MG: 40 TABLET, DELAYED RELEASE ORAL at 05:50

## 2024-02-17 RX ADMIN — DEXAMETHASONE 4 MG: 4 TABLET ORAL at 21:00

## 2024-02-17 RX ADMIN — SODIUM CHLORIDE, PRESERVATIVE FREE 10 ML: 5 INJECTION INTRAVENOUS at 09:21

## 2024-02-17 RX ADMIN — DEXAMETHASONE 4 MG: 4 TABLET ORAL at 09:15

## 2024-02-17 RX ADMIN — GABAPENTIN 100 MG: 100 CAPSULE ORAL at 09:16

## 2024-02-17 RX ADMIN — SODIUM CHLORIDE, PRESERVATIVE FREE 10 ML: 5 INJECTION INTRAVENOUS at 21:03

## 2024-02-17 RX ADMIN — ENOXAPARIN SODIUM 30 MG: 100 INJECTION SUBCUTANEOUS at 09:30

## 2024-02-17 RX ADMIN — GABAPENTIN 100 MG: 100 CAPSULE ORAL at 21:00

## 2024-02-17 RX ADMIN — Medication 6 MG: at 21:00

## 2024-02-17 RX ADMIN — LOSARTAN POTASSIUM 25 MG: 25 TABLET, FILM COATED ORAL at 10:32

## 2024-02-17 RX ADMIN — POTASSIUM BICARBONATE 40 MEQ: 782 TABLET, EFFERVESCENT ORAL at 09:16

## 2024-02-17 NOTE — CONSULTS
[facility]    Consult Note      Requesting Physician:  Basil Leo     CHIEF COMPLAINT:  generalized weakness       HISTORY OF PRESENT ILLNESS:    Ms. Minor Valadez  is a 68 y.o. female we are seeing in consultation for Metastatic lung cancer. This is a recently diagnosed disease and she has not yet started treatment. Plan for Gamma Knife on 2/20. Systemic treatment has not yet been determined as we await molecular profiling.     She presented to the ED with c/o generalized weakness, decreased PO intake. Her symptoms have worsened over the past week. She reports not being able to make her body do what she wants it to do. This is causing great upset and she reports it makes her cry. Examples include not being able to roll herself in bed, slow movements of her arms. She has been subsisting on protein shakes at home. She has unintentionally lost approx 25 lbs.     Past Medical History:  Past Medical History:   Diagnosis Date    Age-related osteoporosis without current pathological fracture 03/20/2012    Asthma     Mild, seasonal    Bradycardia     Degenerative arthritis of left knee 11/01/2021    History of recurrent UTIs 05/04/2021    Lung cancer (HCC)     Parkinson disease (HCC) - diagnosed age 60 09/12/2016    Postmenopausal     Urinary incontinence 2016    Bladder leaks coughing laughing etc       Past Surgical History:  Past Surgical History:   Procedure Laterality Date    BRONCHOSCOPY Left 1/24/2024    BRONCHOSCOPY WITH ENDOBRONCHIAL ULTRASOUND MASS OF LOWER LOBE OF LEFT LUNG, MEDIASTINAL LYMPHADENOPATHY, LEFT LOWER LOBE LUNG MASS BIOPSY performed by Kevin Cotton MD at [facility] ENDOSCOPY    COLONOSCOPY  11/2006    Normal, repeat 11/11 due to (+) FH  Hilario    COLONOSCOPY  07/2015    Dr. Kennedy; one polyp, benign; repeat 7/20    FINGER SURGERY Bilateral     Trigger finger repair 
icterus  HEENT: negative for hearing loss, tinnitus, ear drainage, sinus pressure, nasal congestion, epistaxis and snoring  RESPIRATORY: Negative for hemoptysis, cough, sputum production  CARDIOVASCULAR: negative for chest pain, palpitations, exertional chest pressure/discomfort, edema, syncope  GASTROINTESTINAL: negative for nausea, vomiting, diarrhea, constipation, blood in stool and abdominal pain  GENITOURINARY: negative for frequency, dysuria, urinary incontinence, decreased urine volume, and hematuria  HEMATOLOGIC/LYMPHATIC: negative for easy bruising, bleeding and lymphadenopathy  ALLERGIC/IMMUNOLOGIC: negative for recurrent infections, angioedema, anaphylaxis and drug reactions  ENDOCRINE: negative for weight changes and diabetic symptoms including polyuria, polydipsia and polyphagia  MUSCULOSKELETAL: positive for muscle weakness; negative for pain, joint swelling, decreased range of motion  NEUROLOGICAL: negative for headaches, slurred speech, unilateral weakness  PSYCHIATRIC/BEHAVIORAL: negative for hallucinations, behavioral problems, confusion and agitation.     Physical Examination:  Vitals: Patient Vitals for the past 24 hrs:   BP Temp Temp src Pulse Resp SpO2   02/16/24 1300 138/78 98.4 °F (36.9 °C) Oral 55 16 96 %   02/16/24 0748 128/78 97.4 °F (36.3 °C) Oral 52 16 95 %   02/16/24 0530 (!) 172/97 97.9 °F (36.6 °C) Oral 60 18 95 %     Mood: Stable  Const: No distress  ENT: Oral mucosa moist  Eyes: No discharge or injection  CV: Regular rate and rhythm, no murmur rub or gallop noted  Resp: Lungs clear to auscultation bilaterally, no rales wheezes or ronchi  GI: Soft, nontender, nondistended. Normal bowel sounds.  Neuro: Alert, oriented, appropriate. No cranial nerve deficits appreciated. Sensation intact to light touch. Motor examination reveals diffuse weakness.   Skin: No lesions or rashes noted.   MSK: No joint abnormalities noted.       Lab Results   Component Value Date    WBC 21.2 (H) 02/16/2024 
48. MG CPCR, Take 1 capsule by mouth 6 times daily  Riboflavin 400 MG TABS, Take 400 mg by mouth daily  meloxicam (MOBIC) 15 MG tablet, Take 1 tablet by mouth daily as needed for Pain  ciprofloxacin (CIPRO) 250 MG tablet, Take one tablet after intercourse to prevent bladder infection.Take 1 tablet twice a day for three days if an infection occurs. (Patient not taking: Reported on 1/26/2024)  loratadine (CLARITIN) 10 MG tablet, Take 1 tablet by mouth daily  ascorbic acid (VITAMIN C) 500 MG tablet, Take 1 tablet by mouth daily  PRAMIPEXOLE DIHYDROCHLORIDE PO, Take by mouth Dr Goins (Patient not taking: Reported on 1/17/2024)  gabapentin (NEURONTIN) 300 MG capsule, Take 100 mg by mouth 3 times daily.  Multiple Vitamins-Minerals (MULTIVITAMIN PO), Take  by mouth.  Calcium Carb-Cholecalciferol (CALCIUM 1000 + D PO), Take  by mouth.    Allergies:  Latex, Penicillins, and Codeine    Social History:    TOBACCO: reports that she has never smoked. She has been exposed to tobacco smoke. She has never used smokeless tobacco.  ETOH:   reports that she does not currently use alcohol.  Patient currently lives with family     Review of Systems -   Review of Systems: A 10 point review of systems was conducted, significant findings as notedin HPI.    Objective:          Physical Exam  Constitutional:       General: She is not in acute distress.     Appearance: She is ill-appearing.   Cardiovascular:      Rate and Rhythm: Normal rate and regular rhythm.      Heart sounds: Normal heart sounds.   Pulmonary:      Breath sounds: Normal breath sounds.   Abdominal:      General: Bowel sounds are normal.      Palpations: Abdomen is soft.   Musculoskeletal:      Right lower leg: No edema.      Left lower leg: No edema.   Skin:     General: Skin is warm and dry.   Neurological:      General: No focal deficit present.      Mental Status: She is alert and oriented to person, place, and time.          Palliative Performance 
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  She was admitted today with worsening weakness and failure to thrive.      Personally reviewed her MRI of the brain with Dr. Salas.  The left supportable lesion would potentially impact her vision but should not have any bearing on strength.  Also reviewed her CT of the chest.  No apparent lesions affecting the lower cervical or thoracic spine.  Full visualization of the cervical spine was not available on this imaging however she seems to be stronger than she was this morning so most likely related to nutritional issues.    Patient states she is hoping to get stronger but wants to also get started with her brain radiation treatments.    In order to keep her previously scheduled treatment for her left occipital lobe lesion, she will need to be discharged by Friday evening because patients have to be discharged for 72 hours prior to gamma knife/5 fraction radiation.  Otherwise we will need to postpone treatment until she can get stronger.    Kym, the lead gamma knife nurse will follow-up with the patient on Monday to discuss treatment schedule depending on when she is discharged.    Case was also discussed with SHANI Shell.      Thank you for allowing us to participate in this patient's care.     Please note this document has been produced using speech recognition software and may contain errors related to that system including errors in grammar, punctuation, and

## 2024-02-17 NOTE — RT PROTOCOL NOTE
RT Inhaler-Nebulizer Bronchodilator Protocol Note    There is a bronchodilator order in the chart from a provider indicating to follow the RT Bronchodilator Protocol and there is an “Initiate RT Inhaler-Nebulizer Bronchodilator Protocol” order as well (see protocol at bottom of note).    CXR Findings:  XR CHEST PORTABLE    Result Date: 2/14/2024  Extensive multifocal pulmonary nodules again noted. Left basilar pleural-parenchymal consolidation with obscuration of the left hemidiaphragm similar to chest CT of 1/12/2024. Top normal heart size.       The findings from the last RT Protocol Assessment were as follows:   History Pulmonary Disease: Chronic pulmonary disease  Respiratory Pattern: Regular pattern and RR 12-20 bpm  Breath Sounds: Slightly diminished and/or crackles  Cough: Strong, spontaneous, non-productive  Indication for Bronchodilator Therapy: On home bronchodilators  Bronchodilator Assessment Score: 4    Aerosolized bronchodilator medication orders have been revised according to the RT Inhaler-Nebulizer Bronchodilator Protocol below.    Respiratory Therapist to perform RT Therapy Protocol Assessment initially then follow the protocol.  Repeat RT Therapy Protocol Assessment PRN for score 0-3 or on second treatment, BID, and PRN for scores above 3.    No Indications - adjust the frequency to every 6 hours PRN wheezing or bronchospasm, if no treatments needed after 48 hours then discontinue using Per Protocol order mode.     If indication present, adjust the RT bronchodilator orders based on the Bronchodilator Assessment Score as indicated below.  Use Inhaler orders unless patient has one or more of the following: on home nebulizer, not able to hold breath for 10 seconds, is not alert and oriented, cannot activate and use MDI correctly, or respiratory rate 25 breaths per minute or more, then use the equivalent nebulizer order(s) with same Frequency and PRN reasons based on the score.  If a patient is on 
order with TID Frequency and one order with Frequency of every 4 hours PRN wheezing or increased work of breathing using Per Protocol order mode.       11-13 - enter or revise RT Bronchodilator order(s) to one equivalent RT bronchodilator order with QID Frequency and an Albuterol order with Frequency of every 4 hours PRN wheezing or increased work of breathing using Per Protocol order mode.      Greater than 13 - enter or revise RT Bronchodilator order(s) to one equivalent RT bronchodilator order with every 4 hours Frequency and an Albuterol order with Frequency of every 2 hours PRN wheezing or increased work of breathing using Per Protocol order mode.     RT to enter RT Home Evaluation for COPD & MDI Assessment order using Per Protocol order mode.    Electronically signed by Kenn Machuca RCP on 2/17/2024 at 9:09 AM

## 2024-02-18 PROCEDURE — 2580000003 HC RX 258: Performed by: STUDENT IN AN ORGANIZED HEALTH CARE EDUCATION/TRAINING PROGRAM

## 2024-02-18 PROCEDURE — 1200000000 HC SEMI PRIVATE

## 2024-02-18 PROCEDURE — 6370000000 HC RX 637 (ALT 250 FOR IP): Performed by: STUDENT IN AN ORGANIZED HEALTH CARE EDUCATION/TRAINING PROGRAM

## 2024-02-18 PROCEDURE — 6370000000 HC RX 637 (ALT 250 FOR IP): Performed by: NURSE PRACTITIONER

## 2024-02-18 PROCEDURE — 6360000002 HC RX W HCPCS: Performed by: STUDENT IN AN ORGANIZED HEALTH CARE EDUCATION/TRAINING PROGRAM

## 2024-02-18 PROCEDURE — 6360000002 HC RX W HCPCS: Performed by: NURSE PRACTITIONER

## 2024-02-18 RX ORDER — LOSARTAN POTASSIUM 50 MG/1
50 TABLET ORAL DAILY
Status: DISCONTINUED | OUTPATIENT
Start: 2024-02-18 | End: 2024-02-20 | Stop reason: HOSPADM

## 2024-02-18 RX ORDER — HYDRALAZINE HYDROCHLORIDE 20 MG/ML
10 INJECTION INTRAMUSCULAR; INTRAVENOUS EVERY 6 HOURS PRN
Status: DISCONTINUED | OUTPATIENT
Start: 2024-02-18 | End: 2024-02-20 | Stop reason: HOSPADM

## 2024-02-18 RX ORDER — HYDROXYZINE PAMOATE 25 MG/1
25 CAPSULE ORAL ONCE
Status: COMPLETED | OUTPATIENT
Start: 2024-02-18 | End: 2024-02-18

## 2024-02-18 RX ADMIN — DEXAMETHASONE 4 MG: 4 TABLET ORAL at 09:40

## 2024-02-18 RX ADMIN — HYDRALAZINE HYDROCHLORIDE 10 MG: 20 INJECTION, SOLUTION INTRAMUSCULAR; INTRAVENOUS at 01:00

## 2024-02-18 RX ADMIN — GABAPENTIN 100 MG: 100 CAPSULE ORAL at 21:20

## 2024-02-18 RX ADMIN — SODIUM CHLORIDE, PRESERVATIVE FREE 10 ML: 5 INJECTION INTRAVENOUS at 21:20

## 2024-02-18 RX ADMIN — ENOXAPARIN SODIUM 30 MG: 100 INJECTION SUBCUTANEOUS at 09:41

## 2024-02-18 RX ADMIN — ACETAMINOPHEN 650 MG: 325 TABLET ORAL at 05:47

## 2024-02-18 RX ADMIN — SODIUM CHLORIDE, PRESERVATIVE FREE 10 ML: 5 INJECTION INTRAVENOUS at 09:42

## 2024-02-18 RX ADMIN — GABAPENTIN 100 MG: 100 CAPSULE ORAL at 09:41

## 2024-02-18 RX ADMIN — PANTOPRAZOLE SODIUM 40 MG: 40 TABLET, DELAYED RELEASE ORAL at 09:59

## 2024-02-18 RX ADMIN — DEXAMETHASONE 4 MG: 4 TABLET ORAL at 21:20

## 2024-02-18 RX ADMIN — LOSARTAN POTASSIUM 50 MG: 50 TABLET, FILM COATED ORAL at 09:41

## 2024-02-18 RX ADMIN — HYDROXYZINE PAMOATE 25 MG: 25 CAPSULE ORAL at 01:40

## 2024-02-18 ASSESSMENT — PAIN SCALES - GENERAL
PAINLEVEL_OUTOF10: 3
PAINLEVEL_OUTOF10: 0

## 2024-02-18 ASSESSMENT — PAIN - FUNCTIONAL ASSESSMENT: PAIN_FUNCTIONAL_ASSESSMENT: PREVENTS OR INTERFERES SOME ACTIVE ACTIVITIES AND ADLS

## 2024-02-18 ASSESSMENT — PAIN DESCRIPTION - ONSET: ONSET: ON-GOING

## 2024-02-18 ASSESSMENT — PAIN DESCRIPTION - LOCATION: LOCATION: HEAD

## 2024-02-18 ASSESSMENT — PAIN DESCRIPTION - FREQUENCY: FREQUENCY: CONTINUOUS

## 2024-02-18 ASSESSMENT — PAIN DESCRIPTION - PAIN TYPE: TYPE: ACUTE PAIN

## 2024-02-18 ASSESSMENT — PAIN DESCRIPTION - DESCRIPTORS: DESCRIPTORS: ACHING;PRESSURE

## 2024-02-19 PROBLEM — R53.1 GENERALIZED WEAKNESS: Status: RESOLVED | Noted: 2024-02-14 | Resolved: 2024-02-19

## 2024-02-19 LAB
ANION GAP SERPL CALCULATED.3IONS-SCNC: 14 MMOL/L (ref 3–16)
BUN SERPL-MCNC: 15 MG/DL (ref 7–20)
CALCIUM SERPL-MCNC: 9.3 MG/DL (ref 8.3–10.6)
CHLORIDE SERPL-SCNC: 98 MMOL/L (ref 99–110)
CO2 SERPL-SCNC: 21 MMOL/L (ref 21–32)
CREAT SERPL-MCNC: <0.5 MG/DL (ref 0.6–1.2)
DEPRECATED RDW RBC AUTO: 16 % (ref 12.4–15.4)
GFR SERPLBLD CREATININE-BSD FMLA CKD-EPI: >60 ML/MIN/{1.73_M2}
GLUCOSE SERPL-MCNC: 151 MG/DL (ref 70–99)
HCT VFR BLD AUTO: 37 % (ref 36–48)
HGB BLD-MCNC: 11.7 G/DL (ref 12–16)
MAGNESIUM SERPL-MCNC: 1.7 MG/DL (ref 1.8–2.4)
MCH RBC QN AUTO: 27.9 PG (ref 26–34)
MCHC RBC AUTO-ENTMCNC: 31.6 G/DL (ref 31–36)
MCV RBC AUTO: 88.4 FL (ref 80–100)
PLATELET # BLD AUTO: 315 K/UL (ref 135–450)
PMV BLD AUTO: 7.2 FL (ref 5–10.5)
POTASSIUM SERPL-SCNC: 3.5 MMOL/L (ref 3.5–5.1)
RBC # BLD AUTO: 4.19 M/UL (ref 4–5.2)
SODIUM SERPL-SCNC: 133 MMOL/L (ref 136–145)
WBC # BLD AUTO: 25.3 K/UL (ref 4–11)

## 2024-02-19 PROCEDURE — 6370000000 HC RX 637 (ALT 250 FOR IP): Performed by: NEUROLOGICAL SURGERY

## 2024-02-19 PROCEDURE — 83735 ASSAY OF MAGNESIUM: CPT

## 2024-02-19 PROCEDURE — 97116 GAIT TRAINING THERAPY: CPT

## 2024-02-19 PROCEDURE — 2580000003 HC RX 258: Performed by: STUDENT IN AN ORGANIZED HEALTH CARE EDUCATION/TRAINING PROGRAM

## 2024-02-19 PROCEDURE — 85027 COMPLETE CBC AUTOMATED: CPT

## 2024-02-19 PROCEDURE — 1200000000 HC SEMI PRIVATE

## 2024-02-19 PROCEDURE — 6360000002 HC RX W HCPCS: Performed by: NURSE PRACTITIONER

## 2024-02-19 PROCEDURE — 92526 ORAL FUNCTION THERAPY: CPT

## 2024-02-19 PROCEDURE — 6370000000 HC RX 637 (ALT 250 FOR IP): Performed by: STUDENT IN AN ORGANIZED HEALTH CARE EDUCATION/TRAINING PROGRAM

## 2024-02-19 PROCEDURE — 97535 SELF CARE MNGMENT TRAINING: CPT

## 2024-02-19 PROCEDURE — 36415 COLL VENOUS BLD VENIPUNCTURE: CPT

## 2024-02-19 PROCEDURE — 97530 THERAPEUTIC ACTIVITIES: CPT

## 2024-02-19 PROCEDURE — 80048 BASIC METABOLIC PNL TOTAL CA: CPT

## 2024-02-19 PROCEDURE — 97110 THERAPEUTIC EXERCISES: CPT

## 2024-02-19 PROCEDURE — 6360000002 HC RX W HCPCS: Performed by: STUDENT IN AN ORGANIZED HEALTH CARE EDUCATION/TRAINING PROGRAM

## 2024-02-19 RX ORDER — CALCIUM CARBONATE 500 MG/1
500 TABLET, CHEWABLE ORAL 3 TIMES DAILY PRN
Qty: 90 TABLET | Refills: 0 | Status: SHIPPED | OUTPATIENT
Start: 2024-02-19 | End: 2024-03-20

## 2024-02-19 RX ORDER — DEXAMETHASONE 4 MG/1
4 TABLET ORAL EVERY 12 HOURS SCHEDULED
Qty: 60 TABLET | Refills: 0 | Status: SHIPPED | OUTPATIENT
Start: 2024-02-19 | End: 2024-03-20

## 2024-02-19 RX ORDER — SODIUM CHLORIDE, SODIUM LACTATE, POTASSIUM CHLORIDE, CALCIUM CHLORIDE 600; 310; 30; 20 MG/100ML; MG/100ML; MG/100ML; MG/100ML
INJECTION, SOLUTION INTRAVENOUS CONTINUOUS
OUTPATIENT
Start: 2024-02-19

## 2024-02-19 RX ORDER — PANTOPRAZOLE SODIUM 40 MG/1
40 TABLET, DELAYED RELEASE ORAL
Qty: 30 TABLET | Refills: 3 | Status: SHIPPED | OUTPATIENT
Start: 2024-02-20

## 2024-02-19 RX ORDER — LOSARTAN POTASSIUM 50 MG/1
50 TABLET ORAL DAILY
Qty: 30 TABLET | Refills: 3 | Status: SHIPPED | OUTPATIENT
Start: 2024-02-20

## 2024-02-19 RX ADMIN — GABAPENTIN 100 MG: 100 CAPSULE ORAL at 13:10

## 2024-02-19 RX ADMIN — GABAPENTIN 100 MG: 100 CAPSULE ORAL at 21:28

## 2024-02-19 RX ADMIN — SODIUM CHLORIDE, PRESERVATIVE FREE 10 ML: 5 INJECTION INTRAVENOUS at 11:13

## 2024-02-19 RX ADMIN — PANTOPRAZOLE SODIUM 40 MG: 40 TABLET, DELAYED RELEASE ORAL at 06:51

## 2024-02-19 RX ADMIN — NYSTATIN 500000 UNITS: 100000 SUSPENSION ORAL at 18:28

## 2024-02-19 RX ADMIN — ACETAMINOPHEN 650 MG: 325 TABLET ORAL at 04:29

## 2024-02-19 RX ADMIN — LOSARTAN POTASSIUM 50 MG: 50 TABLET, FILM COATED ORAL at 08:23

## 2024-02-19 RX ADMIN — DEXAMETHASONE 4 MG: 4 TABLET ORAL at 21:28

## 2024-02-19 RX ADMIN — NYSTATIN 500000 UNITS: 100000 SUSPENSION ORAL at 13:10

## 2024-02-19 RX ADMIN — NYSTATIN 500000 UNITS: 100000 SUSPENSION ORAL at 21:28

## 2024-02-19 RX ADMIN — ENOXAPARIN SODIUM 30 MG: 100 INJECTION SUBCUTANEOUS at 08:23

## 2024-02-19 RX ADMIN — ACETAMINOPHEN 650 MG: 325 TABLET ORAL at 00:19

## 2024-02-19 RX ADMIN — SODIUM CHLORIDE, PRESERVATIVE FREE 10 ML: 5 INJECTION INTRAVENOUS at 21:28

## 2024-02-19 RX ADMIN — GABAPENTIN 100 MG: 100 CAPSULE ORAL at 08:23

## 2024-02-19 RX ADMIN — HYDRALAZINE HYDROCHLORIDE 10 MG: 20 INJECTION, SOLUTION INTRAMUSCULAR; INTRAVENOUS at 00:21

## 2024-02-19 RX ADMIN — DEXAMETHASONE 4 MG: 4 TABLET ORAL at 08:23

## 2024-02-19 ASSESSMENT — PAIN SCALES - GENERAL
PAINLEVEL_OUTOF10: 3
PAINLEVEL_OUTOF10: 0
PAINLEVEL_OUTOF10: 3
PAINLEVEL_OUTOF10: 0
PAINLEVEL_OUTOF10: 3
PAINLEVEL_OUTOF10: 0
PAINLEVEL_OUTOF10: 4
PAINLEVEL_OUTOF10: 0

## 2024-02-19 ASSESSMENT — PAIN - FUNCTIONAL ASSESSMENT
PAIN_FUNCTIONAL_ASSESSMENT: ACTIVITIES ARE NOT PREVENTED
PAIN_FUNCTIONAL_ASSESSMENT: ACTIVITIES ARE NOT PREVENTED

## 2024-02-19 ASSESSMENT — PAIN DESCRIPTION - LOCATION
LOCATION: FACE
LOCATION: HEAD

## 2024-02-19 ASSESSMENT — PAIN DESCRIPTION - ONSET
ONSET: AWAKENED FROM SLEEP
ONSET: AWAKENED FROM SLEEP

## 2024-02-19 ASSESSMENT — PAIN DESCRIPTION - FREQUENCY
FREQUENCY: INTERMITTENT
FREQUENCY: INTERMITTENT

## 2024-02-19 ASSESSMENT — PAIN DESCRIPTION - DESCRIPTORS
DESCRIPTORS: ACHING
DESCRIPTORS: OTHER (COMMENT)

## 2024-02-19 ASSESSMENT — PAIN DESCRIPTION - PAIN TYPE
TYPE: ACUTE PAIN
TYPE: ACUTE PAIN

## 2024-02-19 ASSESSMENT — PAIN DESCRIPTION - ORIENTATION
ORIENTATION: POSTERIOR
ORIENTATION: ANTERIOR

## 2024-02-19 NOTE — DISCHARGE SUMMARY
V2.0  Discharge Summary    Name:  Nabor Valadez /Age/Sex: 1955 (68 y.o. female)   Admit Date: 2024  Discharge Date: 24    MRN & CSN:  [id number] & [id number] Encounter Date and Time 24 8:57 AM EST    Attending:  Mary Lou Gong MD Discharging Provider: Mary Lou Gong MD       Hospital Course:     Brief HPI: Nabor Valadez is a 68 y.o. 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  Patient had been recently diagnosed with left-sided squamous cell lung cancer with metastasis to the opposite lung as well as brain.  Patient was scheduled for gamma knife surgery on 2024.  Has not received any sort of chemotherapy or radiation so far.  Was found to have metastasis to the brain and was taking dexamethasone for vasogenic edema.  Patient developed acid reflux and was started on PPI therapy.  At baseline patient is able to ambulate independently however over the past week patient has had progressive need for assistance in ambulation and physical activity.  Patient reports subsisting on protein shakes because she is unable to tolerate regular diet.  In the ER patient was hypertensive with a blood pressure of 192/99. Labs were largely unremarkable except for WBC of 25.9.  Chest x-ray showed left lower lobe consolidation consistent with pre-existing cancer.  Patient was admitted for further workup and treatment.    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

## 2024-02-19 NOTE — CARE COORDINATION
CM  d/w  Liaison  [facility]  [place] and they are  reviewing  pt  and  may be able to accept  :  ( Romana )   CM  clarified with  Dr Gong  pt is still pending  MBS and  can  d/c after if  cleared.  He acknowledged  via [employer].    Electronically signed by Ashley Valderrama RN on 2/19/2024 at 2:58 PM     +++++++++++++++++++++++++++++++++++++++++++++++++++++++++++        CM  following for  d/c planning:    Patient  was  pending  PTP  this  am  and was  due  by  1200 today :    CM  was informed  at  IDR rounds  that PTP was  denied  and   rec:  SNf  level of care.    CM  met with pt at bedside to update  and  discuss  d/c planning  options  ;    CM  reviewed  SNF list with pt  and  she  will let CM  know of her  choices  , She states  she is awaiting a  Swallow  Eval  with  SLP  before she  leaves as well.     CM will cont to follow.    Electronically signed by Ashley Valderrama RN on 2/19/2024 at 2:19 PM       Ashley Valderrama RN Case Manager  [facility]  [address]  [place] [address]  [phone]  Fax [phone]

## 2024-02-20 ENCOUNTER — HOSPITAL ENCOUNTER (OUTPATIENT)
Dept: RADIATION ONCOLOGY | Age: 69
Discharge: HOME | End: 2024-02-20

## 2024-02-20 ENCOUNTER — APPOINTMENT (OUTPATIENT)
Dept: GENERAL RADIOLOGY | Age: 69
DRG: 640 | End: 2024-02-20
Payer: MEDICARE

## 2024-02-20 VITALS
BODY MASS INDEX: 18.14 KG/M2 | TEMPERATURE: 98.2 F | OXYGEN SATURATION: 94 % | WEIGHT: 102.4 LBS | HEART RATE: 70 BPM | RESPIRATION RATE: 18 BRPM | SYSTOLIC BLOOD PRESSURE: 147 MMHG | DIASTOLIC BLOOD PRESSURE: 89 MMHG | HEIGHT: 63 IN

## 2024-02-20 LAB
BILIRUB UR QL STRIP.AUTO: NEGATIVE
CLARITY UR: CLEAR
COLOR UR: YELLOW
EPI CELLS #/AREA URNS HPF: NORMAL /HPF (ref 0–5)
GLUCOSE UR STRIP.AUTO-MCNC: NEGATIVE MG/DL
HGB UR QL STRIP.AUTO: NEGATIVE
KETONES UR STRIP.AUTO-MCNC: NEGATIVE MG/DL
LEUKOCYTE ESTERASE UR QL STRIP.AUTO: NEGATIVE
NITRITE UR QL STRIP.AUTO: NEGATIVE
PH UR STRIP.AUTO: 7.5 [PH] (ref 5–8)
PROT UR STRIP.AUTO-MCNC: 30 MG/DL
RBC #/AREA URNS HPF: NORMAL /HPF (ref 0–4)
SP GR UR STRIP.AUTO: 1.02 (ref 1–1.03)
UA COMPLETE W REFLEX CULTURE PNL UR: ABNORMAL
UA DIPSTICK W REFLEX MICRO PNL UR: YES
URN SPEC COLLECT METH UR: ABNORMAL
UROBILINOGEN UR STRIP-ACNC: 1 E.U./DL
WBC #/AREA URNS HPF: NORMAL /HPF (ref 0–5)

## 2024-02-20 PROCEDURE — 6370000000 HC RX 637 (ALT 250 FOR IP): Performed by: STUDENT IN AN ORGANIZED HEALTH CARE EDUCATION/TRAINING PROGRAM

## 2024-02-20 PROCEDURE — 92611 MOTION FLUOROSCOPY/SWALLOW: CPT

## 2024-02-20 PROCEDURE — 6360000002 HC RX W HCPCS: Performed by: STUDENT IN AN ORGANIZED HEALTH CARE EDUCATION/TRAINING PROGRAM

## 2024-02-20 PROCEDURE — 6360000002 HC RX W HCPCS: Performed by: NURSE PRACTITIONER

## 2024-02-20 PROCEDURE — 74230 X-RAY XM SWLNG FUNCJ C+: CPT

## 2024-02-20 PROCEDURE — 2580000003 HC RX 258: Performed by: STUDENT IN AN ORGANIZED HEALTH CARE EDUCATION/TRAINING PROGRAM

## 2024-02-20 RX ADMIN — SODIUM CHLORIDE, PRESERVATIVE FREE 10 ML: 5 INJECTION INTRAVENOUS at 09:26

## 2024-02-20 RX ADMIN — HYDRALAZINE HYDROCHLORIDE 10 MG: 20 INJECTION, SOLUTION INTRAMUSCULAR; INTRAVENOUS at 04:44

## 2024-02-20 RX ADMIN — GABAPENTIN 100 MG: 100 CAPSULE ORAL at 12:12

## 2024-02-20 RX ADMIN — PANTOPRAZOLE SODIUM 40 MG: 40 TABLET, DELAYED RELEASE ORAL at 05:58

## 2024-02-20 RX ADMIN — NYSTATIN 500000 UNITS: 100000 SUSPENSION ORAL at 16:12

## 2024-02-20 RX ADMIN — LOSARTAN POTASSIUM 50 MG: 50 TABLET, FILM COATED ORAL at 09:32

## 2024-02-20 RX ADMIN — ENOXAPARIN SODIUM 30 MG: 100 INJECTION SUBCUTANEOUS at 09:19

## 2024-02-20 RX ADMIN — DEXAMETHASONE 4 MG: 4 TABLET ORAL at 12:12

## 2024-02-20 RX ADMIN — NYSTATIN 500000 UNITS: 100000 SUSPENSION ORAL at 12:12

## 2024-02-20 ASSESSMENT — PAIN SCALES - GENERAL
PAINLEVEL_OUTOF10: 0

## 2024-02-20 NOTE — PROGRESS NOTES
ONCOLOGY HEMATOLOGY CARE PROGRESS NOTE      SUBJECTIVE:     Nabor Valadez Is a 68-year-old female with new diagnosis metastatic lung cancer and brain metastasis.  She remains weak but is feeling better today.  Currently napping after lunch.      ROS:     Constitutional:  19 pound weight loss over the last 4 months. No fever, No chills, No night sweats.  Moderate fatigue.  Eyes:  No impairment or change in vision  ENT / Mouth:  No pain, abnormal ulceration, bleeding, nasal drip or change in voice or hearing  Cardiovascular:  No chest pain, palpitations, new edema, or calf discomfort  Respiratory:  No pain, hemoptysis, change to breathing  Breast:  No pain, discharge, change in appearance or texture  Gastrointestinal:  No pain, cramping, jaundice, change to eating and bowel habits  Urinary:  No pain, bleeding or change in continence  Genitalia: No pain, bleeding or discharge  Musculoskeletal:  Improved generalized weakness.  Skin:  No pruritus, rash, change to nodules or lesions  Neurologic:  No discomfort, change in mental status, speech, sensory or motor activity  Psychiatric:  No change in concentration or change to affect or mood  Endocrine:  No hot flashes, increased thirst, or change to urine production  Hematologic: No petechiae, ecchymosis or bleeding  Lymphatic:  No lymphadenopathy or lymphedema  Allergy / Immunologic:  No eczema, hives, frequent or recurrent infections    OBJECTIVE        Physical    VITALS:  /78   Pulse 55   Temp 98.4 °F (36.9 °C) (Oral)   Resp 16   Ht 1.6 m (5' 3\")   Wt 46.4 kg (102 lb 6.4 oz)   SpO2 96%   BMI 18.14 kg/m²   TEMPERATURE:  Current - Temp: 98.4 °F (36.9 °C); Max - Temp  Av °F (36.7 °C)  Min: 97.4 °F (36.3 °C)  Max: 98.4 °F (36.9 °C)  PULSE OXIMETRY RANGE: SpO2  Av %  Min: 95 %  Max: 98 %  24HR INTAKE/OUTPUT:    Intake/Output Summary (Last 24 hours) at 2024 1345  Last data filed at 2024 1030  Gross 
                                      ONCOLOGY HEMATOLOGY CARE PROGRESS NOTE      SUBJECTIVE:    She is feeling a bit better today. Weakness has improved. She is eating breakfast    ROS:     Constitutional:  No weight loss, No fever, No chills, No night sweats.  Energy level improved   Eyes:  No impairment or change in vision  ENT / Mouth:  No pain, abnormal ulceration, bleeding, nasal drip or change in voice or hearing  Cardiovascular:  No chest pain, palpitations, new edema, or calf discomfort  Respiratory:  No pain, hemoptysis, change to breathing  Breast:  No pain, discharge, change in appearance or texture  Gastrointestinal:  No pain, cramping, jaundice, change to eating and bowel habits  Urinary:  No pain, bleeding or change in continence  Genitalia: No pain, bleeding or discharge  Musculoskeletal:  No redness, pain, edema or weakness  Skin:  No pruritus, rash, change to nodules or lesions  Neurologic:  No discomfort, change in mental status, speech, sensory or motor activity  Psychiatric:  No change in concentration or change to affect or mood  Endocrine:  No hot flashes, increased thirst, or change to urine production  Hematologic: No petechiae, ecchymosis or bleeding  Lymphatic:  No lymphadenopathy or lymphedema  Allergy / Immunologic:  No eczema, hives, frequent or recurrent infections    OBJECTIVE        Physical    VITALS:  Patient Vitals for the past 24 hrs:   BP Temp Temp src Pulse Resp SpO2   02/16/24 0748 128/78 97.4 °F (36.3 °C) Oral 52 16 95 %   02/16/24 0530 (!) 172/97 97.9 °F (36.6 °C) Oral 60 18 95 %   02/15/24 1945 117/71 98.1 °F (36.7 °C) Oral 68 18 98 %   02/15/24 1800 (!) 141/80 98.4 °F (36.9 °C) Oral 66 18 96 %   02/15/24 1130 (!) 170/94 98.7 °F (37.1 °C) Oral 65 17 95 %       24HR INTAKE/OUTPUT:    Intake/Output Summary (Last 24 hours) at 2/16/2024 0916  Last data filed at 2/15/2024 2246  Gross per 24 hour   Intake 1906.35 ml   Output --   Net 1906.35 ml       CONSTITUTIONAL: awake, alert, 
                        Speech Language Pathology  Facility/Department:[address] TELEMETRY  Dysphagia Treatment  Name: Nabor Valadez  : 1955  MRN: [id number]                                                         Patient Diagnosis(es):   Patient Active Problem List    Diagnosis Date Noted    Bradycardia 2023    Severe malnutrition (HCC) 02/15/2024    Metastasis to brain (HCC) 02/15/2024    Failure to thrive in adult 2024    Malignant neoplasm metastatic to both lungs (HCC) 2024    Mediastinal lymphadenopathy 2024    Primary cancer of left lower lobe of lung (HCC) 2024    Acute cough 2024    Dyspnea 2024    S/P total knee replacement, left 10/13/2023    Abnormal EKG 2023    Family history of colon cancer in mother 2021    History of recurrent UTIs 2021    Seasonal allergies 2021    Parkinson disease (HCC) - diagnosed age 60 2016    Age-related osteoporosis without current pathological fracture 2012    Mild intermittent asthma without complication 11/15/2010       Past Medical History:   Diagnosis Date    Age-related osteoporosis without current pathological fracture 2012    Asthma     Mild, seasonal    Bradycardia     Degenerative arthritis of left knee 2021    History of recurrent UTIs 2021    Lung cancer (HCC)     Parkinson disease (HCC) - diagnosed age 60 2016    Postmenopausal     Urinary incontinence 2016    Bladder leaks coughing laughing etc     Past Surgical History:   Procedure Laterality Date    BRONCHOSCOPY Left 2024    BRONCHOSCOPY WITH ENDOBRONCHIAL ULTRASOUND MASS OF LOWER LOBE OF LEFT LUNG, MEDIASTINAL LYMPHADENOPATHY, LEFT LOWER LOBE LUNG MASS BIOPSY performed by Kevin Cotton MD at [facility] ENDOSCOPY    COLONOSCOPY  2006    Normal, repeat  due to (+) FH  [place]    COLONOSCOPY  2015    Dr. Kennedy; one polyp, benign; repeat     FINGER SURGERY Bilateral     
  Comprehensive Nutrition Assessment    RECOMMENDATIONS:  PO Diet: Regular, least restrictive diet, SLP evlauated  ONS: Decrease plant based ONS from TID to BID  Nutrition Education: No recommendation at this time     NUTRITION ASSESSMENT:   Nutritional summary & status: Follow-up.  Pt w/ variable PO intake per EMR.  Pt reports difficulty swallowing and tolerating ONS only. SLP rec'd reg diet however MBS pending.  Pt reports improvement in appetite/intake.  Drinking ONS BID- will decrease frequency as pt had several bottles on bedside table.  Plans to d/c to SNF.  Will continue monitoring.   Admission // PMH: FTT \\ GERD, parkinsons    MALNUTRITION ASSESSMENT  Context of Malnutrition: Chronic Illness   Malnutrition Status: Severe malnutrition  Findings of the 6 clinical characteristics of malnutrition (Minimum of 2 out of 6 clinical characteristics is required to make the diagnosis of moderate or severe Protein Calorie Malnutrition based on AND/ASPEN Guidelines):  Energy Intake:  75% or less estimated energy requirements for 1 month or longer  Weight Loss:  Greater than 20% over 1 year     Body Fat Loss:  Severe body fat loss Orbital, Triceps, Fat Overlying Ribs   Muscle Mass Loss:  Severe muscle mass loss Temples (temporalis), Clavicles (pectoralis & deltoids)  Fluid Accumulation:  Unable to assess     Strength:  Not Performed    NUTRITION DIAGNOSIS   Severe malnutrition related to catabolic illness as evidenced by poor intake prior to admission, weight loss greater than or equal to 20% in 1 year, severe loss of subcutaneous fat, severe muscle loss    Nutrition Monitoring and Evaluation:   Food/Nutrient Intake Outcomes:  Food and Nutrient Intake, Supplement Intake  Physical Signs/Symptoms Outcomes:  Biochemical Data, Weight     OBJECTIVE DATA: Significant to nutrition assessment  Nutrition Related Findings: +bm 2/18; trace BLE edema  Wounds: None  Nutrition Goals: PO intake 50% or greater, by next RD assessment 
  Comprehensive Nutrition Assessment    RECOMMENDATIONS:  PO Diet: Regular, least restrictive diet, SLP evlauated  ONS: Pt requested Sandy Arreola supplement, will order TID  Nutrition Education: No recommendation at this time     NUTRITION ASSESSMENT:   Nutritional summary & status: Patient admitted with weakness, lethargy, and decreased po intake ~1 month. Patient with recent dx of SCLC with mets to the brain and plans to start gamma knife 2/20. Per EMR edie has had steady significant wt loss over the past month, six months, and year. Patient also with severe muscle and subcutaneous fat wasting. RD will order ONS and continue to monitor nutritional status.   Admission // PMH: FTT \\ GERD, parkinsons    MALNUTRITION ASSESSMENT  Context of Malnutrition: Chronic Illness   Malnutrition Status: Severe malnutrition  Findings of the 6 clinical characteristics of malnutrition (Minimum of 2 out of 6 clinical characteristics is required to make the diagnosis of moderate or severe Protein Calorie Malnutrition based on AND/ASPEN Guidelines):  Energy Intake:  75% or less estimated energy requirements for 1 month or longer  Weight Loss:  Greater than 20% over 1 year     Body Fat Loss:  Severe body fat loss Orbital, Triceps, Fat Overlying Ribs   Muscle Mass Loss:  Severe muscle mass loss Temples (temporalis), Clavicles (pectoralis & deltoids)  Fluid Accumulation:  Unable to assess     Strength:  Not Performed    NUTRITION DIAGNOSIS   Severe malnutrition related to catabolic illness as evidenced by poor intake prior to admission, weight loss greater than or equal to 20% in 1 year, severe loss of subcutaneous fat, severe muscle loss    Nutrition Monitoring and Evaluation:   Food/Nutrient Intake Outcomes:  Food and Nutrient Intake, Supplement Intake  Physical Signs/Symptoms Outcomes:  Biochemical Data, Weight     OBJECTIVE DATA: Significant to nutrition assessment  Nutrition Related Findings: Labs reviewed, NS at 75 
  Hospital Medicine Progress Note      Date of Admission: 2/14/2024  Hospital Day: 2    Chief Admission Complaint: Generalized weakness, failure to thrive     Subjective: Patient was seen and evaluated at bedside.  Did not have any active complaints.  Reported not having good sleep overnight.    Presenting Admission History:       Nabor Valadez is a 68 y.o. 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  Patient had been recently diagnosed with left-sided squamous cell lung cancer with metastasis to the opposite lung as well as brain.  Patient was scheduled for gamma knife surgery on February 20, 2024.  Has not received any sort of chemotherapy or radiation so far.  Was found to have metastasis to the brain and was taking dexamethasone for vasogenic edema.  Patient developed acid reflux and was started on PPI therapy.  At baseline patient is able to ambulate independently however over the past week patient has had progressive need for assistance in ambulation and physical activity.  Patient reports subsisting on protein shakes because she is unable to tolerate regular diet.  In the ER patient was hypertensive with a blood pressure of 192/99. Labs were largely unremarkable except for WBC of 25.9.  Chest x-ray showed left lower lobe consolidation consistent with pre-existing cancer.  Patient was admitted for further workup and treatment.    Assessment/Plan:      Current Principal Problem:  Failure to thrive in adult    Failure to thrive/generalized weakness/cachexia  Metastatic squamous cell cancer of the left lung with mets to the brain and opposite lung  Elevated blood pressure/electrolyte 
  Hospital Medicine Progress Note      Date of Admission: 2/14/2024  Hospital Day: 5    Chief Admission Complaint: Generalized weakness, failure to thrive     Subjective: Patient was seen and evaluated at bedside.  Did not have any active complaints.  Did not have any events overnight.  Reports improvement in diet.  Also reports insomnia.    Presenting Admission History:       Nabor Valadez is a 68 y.o. 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  Patient had been recently diagnosed with left-sided squamous cell lung cancer with metastasis to the opposite lung as well as brain.  Patient was scheduled for gamma knife surgery on February 20, 2024.  Has not received any sort of chemotherapy or radiation so far.  Was found to have metastasis to the brain and was taking dexamethasone for vasogenic edema.  Patient developed acid reflux and was started on PPI therapy.  At baseline patient is able to ambulate independently however over the past week patient has had progressive need for assistance in ambulation and physical activity.  Patient reports subsisting on protein shakes because she is unable to tolerate regular diet.  In the ER patient was hypertensive with a blood pressure of 192/99. Labs were largely unremarkable except for WBC of 25.9.  Chest x-ray showed left lower lobe consolidation consistent with pre-existing cancer.  Patient was admitted for further workup and treatment.    Assessment/Plan:      Current Principal Problem:  Failure to thrive in adult    Failure to thrive/generalized weakness/cachexia  Metastatic squamous cell cancer of the left lung with mets to the brain and opposite 
  Physician Progress Note      PATIENT:               BECCA CABALLERO  CSN #:                  [id number]  :                       1955  ADMIT DATE:       2024 8:32 AM  DISCH DATE:  RESPONDING  PROVIDER #:        Beltran Barreto MD          QUERY TEXT:    Patient admitted with Failure to thrive. Noted to have \"Severe malnutrition\"   on dietician assessment on 24. If possible, please document in progress   notes and discharge summary if you are evaluating and /or treating any of the   following:      The medical record reflects the following:  Risk Factors: Squamous Cell Lung Cancer (metastatic)  Clinical Indicators: per dietician notes on  \"Severe malnutrition. Energy   Intake:  75% or less estimated energy requirements for  1 month or longer. Weight Loss:  Greater than 20% over 1 year. Body Fat Loss:    Severe body fat loss Orbital, Triceps, Fat Overlying  Ribs. Muscle Mass Loss:  Severe muscle mass loss Temples (temporalis),   Clavicles (pectoralis & deltoids)  Treatment: BMP, dietician consult, per recommendation: Regular, least   restrictive diet.  Decrease plant based ONS from TID to BID    ASPEN Criteria:    https://aspenjournals.onlinelibrary.sanchez.com/doi/full/10.1177/421461382525928  5  Options provided:  -- Protein calorie malnutrition severe  -- Other - I will add my own diagnosis  -- Disagree - Not applicable / Not valid  -- Disagree - Clinically unable to determine / Unknown  -- Refer to Clinical Documentation Reviewer    PROVIDER RESPONSE TEXT:    This patient has severe protein calorie malnutrition.    Query created by: Lali Zuniga on 2024 3:29 PM      Electronically signed by:  Beltran Barreto MD 2024 3:36 PM          
  Speech-Language Pathology    Attempted to set-up MBS; spoke with patient bedside, and she is declining at this time, stating she doesn't feel good. D/w RN; will try to check back later.    Aleah Mitchell, SLP, [id number]  Ph. # [phone]      
Occupational Therapy  Daily Treatment Note  Patient Name: Nabor Valadez  MRN: [id number]    Assessment: Pt demonstrated improved endurance and mobility today. She needed min assist only with first transfer; otherwise, needed only CGA and cues with walker. She tolerated more activity overall and was able to complete toileting without physical assist. She remains far from her baseline and would benefit from ARU at d/c to regain her independence with mobility and basic ADLs as she moves forward with treatment.     At baseline this patient was independent with functional mobility & ADL's. The current hospitalization has resulted in the patient now being limited with all aspects of mobility, negatively impacting the patient's functional independence, ability to safely access their home environment, and ability to complete valued daily tasks and life roles. Nabor Valadez is motivated for recovery and demonstrates the ability to tolerate inpatient rehabilitation 3 hours/day, 5 days/week for optimal return to functional independence, quality of life, and decreased caregiver burden.    Discharge Recommendations: ARU  Equipment Needs:  No  [facility]: 17    Chart Reviewed: Yes     Other position/activity restrictions: up as tolerated   Additional Pertinent Hx: Pt is a 68 y.o. female adm 2/14 with failure to thrive.  Pt presented to ED with complaints of progressive generalized weakness with difficulty in ambulation, poor oral intake with failure to thrive and unintentional weight loss of 20 pounds over the last 6 months.  CXR:Extensive multifocal pulmonary nodules noted.   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 Brother visiting. Pt pleasant and cooperative with therapy despite being 
Occupational Therapy  Facility/Department: [address]  Occupational Therapy Initial Assessment/Tx Note    Name: Nabor Valadez  : 1955  MRN: [id number]  Date of Service: 2/15/2024    Assessment: Prior to several weeks ago, pt was independent, active. 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 She requires assist with all aspects of her mobility and most ADLs. She is pleasant and cooperative but is a bit self-limiting due to anxiety and fear of falling. She would benefit from ARU at d/c to increase independence with mobility and ADLs.    Discharge Recommendations:  IP Rehab (if home, 24 hr A, home OT/PT)    OT Equipment Recommendations  Equipment Needed:  (if home: hospital bed and BSC)     Treatment Diagnosis: Decreased activity tolerance, impaired ADLs and mobility      Assessment   Performance deficits / Impairments: Decreased functional mobility ;Decreased ADL status;Decreased strength;Decreased endurance;Decreased balance;Decreased high-level IADLs;Decreased coordination;Decreased fine motor control  Treatment Diagnosis: Decreased activity tolerance, impaired ADLs and mobility  Decision Making: Medium Complexity  REQUIRES OT FOLLOW-UP: Yes  Activity Tolerance  Activity Tolerance: Patient Tolerated treatment well        Plan   Occupational Therapy Plan  Times Per Week: 2-5x  Times Per Day: Once a day  Current Treatment Recommendations: Strengthening, Balance training, Functional mobility training, Endurance training, Gait training, Safety education & training, Patient/Caregiver education & training, Equipment evaluation, education, & procurement, Self-Care / ADL, Cognitive/Perceptual training, Coordination training     Restrictions  Position Activity Restriction  Other position/activity restrictions: up as tolerated    Subjective   General  Chart Reviewed: 
On-call provider, SHANI Tang notified of patient's BP readings: 186/98, 191/102, HR ranging in the 70's on telemetry. Orders placed for Hydralazine (see MAR).     0100: Patient medicated with Hydralazine PRN (see MAR). Will assess for effectiveness.     0123: /88, HR 82 post Hydralazine administration. Patient does endorse feelings of tachypnea, and anxiety. RR 28 at this time, Spo2 ~95%. SHANI Tang notified of current patient condition. Orders placed for Vistaril 25 mg PO once. Patient positioned for comfort as needed.     0200: Patient resting in bed with eyes closed.   
Palliative Care Chart Review  and Check in Note:     NAME:  Nabor Valadez  Admit Date: 2/14/2024  Hospital Day:  Hospital Day: 3   Current Code status: Full Code    Palliative care is continuing to following Ms. Minor Valadez for symptom management,  and goals of care discussion as needed. Patient's chart reviewed today 2/16/24.      Saw pt at the bedside. She was sitting up eating breakfast. She reports feeling well this morning, however she does not feel well enough to go home today. If she does not discharge today she will not be able to get gamma knife as scheduled 2/20. Given that her GK will be delayed regardless, and that her brain lesion does not seem to be the cause of her weakness I recommended consideration of ARU. The pt was agreeable. I d/w Dr. Gong and SHANI Minor with rad/onc.       The following are the currently established goals/code status, and Symptom management.     Goals of care: Pt is hopeful that she will regain enough strength to be able to pursue chemotherapy. Will continue to discuss goals of care in the coming days.      Code status: Full code, discussed 2/15    Discharge plan: Poss [facility], Dr. Gong to make PM&R consult      SHANI Hernandez - CNP  02/16/24  12:01 PM    
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 Hematology and Palliative were consulted. She continues to have functional deficits below her baseline. Today she is seen without family present. She reports continued weakness. She is highly motivated to work with therapies and is interested in ARU.     Interval History:  Plan to go to [facility] in [facility]. She is agreeable to this plan. No new pain overnight but she feels like her sinemet is not working today.       REVIEW OF SYSTEMS:   Denies f/c, n/v, cp, sob     Physical Examination:  Vitals: Patient Vitals for the past 24 hrs:   BP Temp Temp src Pulse Resp SpO2   02/20/24 0917 (!) 168/94 97.8 °F (36.6 °C) Oral 73 18 95 %   02/20/24 0435 (!) 186/78 98.3 °F (36.8 °C) Oral 67 18 94 %   02/20/24 0130 (!) 150/89 97.8 °F (36.6 °C) Oral 67 18 94 %   02/19/24 2129 127/73 98.3 °F (36.8 °C) Oral 66 18 93 %   02/19/24 1547 (!) 148/90 98 °F (36.7 °C) Oral 79 16 93 %   02/19/24 1110 110/65 97.4 °F (36.3 °C) Oral 67 16 94 %       Mood: Stable  Const: No distress  ENT: Oral mucosa moist  Eyes: No discharge or injection  CV: Regular rate and rhythm, no murmur rub or gallop noted  Resp: Lungs clear to auscultation bilaterally, no rales wheezes or ronchi  GI: Soft, nontender, nondistended. Normal bowel sounds.  Neuro: Alert, oriented, appropriate. No cranial nerve deficits appreciated.   Skin: No lesions or rashes noted.   MSK: No joint abnormalities noted.       Lab Results   Component Value Date    WBC 25.3 (H) 02/19/2024    HGB 
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 Hematology and Palliative were consulted. She continues to have functional deficits below her baseline. Today she is seen without family present. She reports continued weakness. She is highly motivated to work with therapies and is interested in ARU.     Interval History:  P2P denied for ARU. Today Nabor is seen in her room. She remains interested in ARU and is considering going to [facility] as a marjorie. Offered appeal option to patient.      has a past medical history of Age-related osteoporosis without current pathological fracture, Asthma, Bradycardia, Degenerative arthritis of left knee, History of recurrent UTIs, Lung cancer (HCC), Parkinson disease (HCC) - diagnosed age 60, Postmenopausal, and Urinary incontinence.     has a past surgical history that includes laparoscopy; Tonsillectomy; Colonoscopy (11/2006); Colonoscopy (07/2015); Finger surgery (Bilateral); joint replacement (Left); and bronchoscopy (Left, 1/24/2024).     reports that she has never smoked. She has been exposed to tobacco smoke. She has never used smokeless tobacco. She reports that she does not currently use alcohol. She reports that she does not use drugs.    family history includes Arthritis in her father and mother; Asthma in her mother; Cancer in her mother; Colon Cancer in her mother; Heart Attack in her father; High Blood Pressure in her father; Lung Cancer in her father; Stroke in her sister.      REVIEW OF SYSTEMS:   Denies 
Physical Therapy  Facility/Department: [address]  Physical Therapy Treatment  Name: Nabor Valadez  : 1955  MRN: [id number]  Date of Service: 2024    Discharge Recommendations:  IP Rehab   PT Equipment Recommendations  Equipment Needed:  (defer)      Patient Diagnosis(es): The encounter diagnosis was Primary malignant neoplasm of lung metastatic to other site, unspecified laterality (HCC).  Past Medical History:  has a past medical history of Age-related osteoporosis without current pathological fracture, Asthma, Bradycardia, Degenerative arthritis of left knee, History of recurrent UTIs, Lung cancer (HCC), Parkinson disease (HCC) - diagnosed age 60, Postmenopausal, and Urinary incontinence.  Past Surgical History:  has a past surgical history that includes laparoscopy; Tonsillectomy; Colonoscopy (2006); Colonoscopy (2015); Finger surgery (Bilateral); joint replacement (Left); and bronchoscopy (Left, 2024).    Assessment   Body Structures, Functions, Activity Limitations Requiring Skilled Therapeutic Intervention: Decreased functional mobility ;Decreased strength;Decreased endurance;Decreased balance  Assessment: Improved mobility this date.  Decreased assist for transfers and increased gt/activity tolerance.  Less guarded as well.  Continues to be below baseline function.  Rec cont skilled IP PT to maximize mobility and independence.  Treatment Diagnosis: impaired functional mobility 2/2 decreased balance and endurance        Plan   Physical Therapy Plan  General Plan:  (2-5)  Current Treatment Recommendations: Strengthening, Balance training, Functional mobility training, Endurance training, Gait training, Safety education & training, Patient/Caregiver education & training  Safety Devices  Type of Devices: Call light within reach, Chair alarm in place, Nurse notified, Left in chair     Restrictions  Position Activity Restriction  Other position/activity restrictions: up 
Physical Therapy  Facility/Department: [address] TELEMETRY  Daily Treatment Note  NAME: Nabor Valadez  : 1955  MRN: [id number]    Date of Service: 2024    Discharge Recommendations:  IP Rehab   PT Equipment Recommendations  Equipment Needed: Yes  Mobility Devices: Walker  Walker: Rolling    Patient Diagnosis(es): The encounter diagnosis was Primary malignant neoplasm of lung metastatic to other site, unspecified laterality (HCC).    Assessment   Assessment: Pt tolerating therapy tx today with no adverse events. Pt participated in functional transfer training, gait training on level surfaces and LE therex. Overall, pt with improvement in her mobility and requiring dec'd assistane to mobilize however continues to function below her baseline mobility. She endorses fatigue with activity and requirng inc'd time to mobilize and to complete therex. Pt is hopeful to DC to rehab facility for continued strength and mobility training.  Activity Tolerance: Patient tolerated evaluation without incident  Equipment Needed: Yes  Mobility Devices: Walker     Plan    Physical Therapy Plan  General Plan:  (2-5 times per week)  Current Treatment Recommendations: Strengthening;Balance training;Functional mobility training;Endurance training;Gait training;Safety education & training;Patient/Caregiver education & training;Home exercise program;Stair training;Transfer training;Equipment evaluation, education, & procurement     Restrictions  Position Activity Restriction  Other position/activity restrictions: up as tolerated     Subjective    Subjective  Subjective: Pt resting in the bed upon PT arrival. Pt agreeable to PT tx.  Pain: Pt denies any pain  Orientation  Overall Orientation Status: Within Normal Limits  Cognition  Overall Cognitive Status: Exceptions  Arousal/Alertness: Delayed responses to stimuli  Initiation: Requires cues for some (delayed initiation noted.)     Objective   Vitals     Bed Mobility 
Pt discharged to [facility] via transport service. All belongings collected and given to patient, including phone, chargers, headphones, clothes, etc. Pt agreeable to rehab, and will follow up with doctors as needed. IV and tele discontinued with no complications.   
Pt off floor to barium swallow study    
Pt returned to floor  
Speech-Language Pathology  Attempt Note    0950: SLP attempted to see pt this AM for dysphagia f/u. Pt currently working with PT. SLP will re-attempt as schedule allows or as pt able.         Hiwot Carrero MA, CCC-SLP  [id number]  Speech-Language Pathologist  Pg. # [phone]  
Update 2024: P2P denied- defer to SNF level of care or patient has option of an appeal.     Update 24: Received all from [facility] with offer for peer to peer with a deadline of tomorrow 2024 12 PM. The provider must call [phone], select option 5 with the pts name, SANDRO, member ID [id number]. CM aware. Perfect[employer]erved Dr. Gong the above information    Coreen Jarrett M.A., [facility]-SLP   Clinical Liaison- [facility]   (P): [phone]  (F): [phone]    [facility] - Acute Rehab Unit   After review, this patient is felt to be:       []  Appropriate for Acute Inpatient Rehab    [x]  Appropriate for Acute Inpatient Rehab Pending Insurance Authorization    []  Not appropriate for Acute Inpatient Rehab    []  Referral received and ARU reviewing patient      Precert initiated 2024 for ARU admission. Pt in agreement per  and CL's conversation with pt this AM. Ref#: [id number] .  Will notify CM/SW with further updates. Thank you for the referral.    Lora HUNG, OTR/L  Clinical Liaison- [facility]   (P): [phone]  (F): [phone]   
VSS, afebrile, pt currently denies having pain.    Pt a/o x4.  Assessment unremarkable at this time.    POC discussed with pt, questions/concerns addressed at this time, fall px in place.   
4.6   RBC 3.74* 3.80* 3.68* 4.11   HGB 10.4* 10.6* 10.3* 11.5*   HCT 32.9* 33.4* 32.6* 36.4   MCV 88.1 88.0 88.6 88.8   MCH 27.8 28.0 28.0 28.1   MCHC 31.6 31.8 31.6 31.7   RDW 15.5* 15.3 15.4 15.6*    300 317 345       PT/INR:    Recent Labs     02/14/24 2015 02/14/24  1027   PROT  --  6.9   INR 1.24*  --      PTT:  No results for input(s): \"APTT\" in the last 720 hours.    CMP:    Recent Labs     02/16/24  0737 02/15/24  0640 02/14/24  1027 02/14/24  0902    138  --  138   K 3.7 3.6  --  4.0    101  --  100   CO2 24 25  --  24   GLUCOSE 142* 126*  --  119*   BUN 16 17  --  19   CREATININE <0.5* <0.5*  --  <0.5*   LABGLOM >60 >60  --  >60   CALCIUM 8.9 9.3  --  9.9   PROT  --   --  6.9  --    LABALBU  --   --  3.5  --    BILITOT  --   --  0.3  --    ALKPHOS  --   --  102  --    ALT  --   --  <5*  --    AST  --   --  14*  --        Lab Results   Component Value Date    CALCIUM 8.9 02/16/2024       LDH:No results for input(s): \"LDH\" in the last 720 hours.    Radiology Review:  XR CHEST PORTABLE  Narrative: Chest    History: Weakness  Number of views: 1  Impression: Extensive multifocal pulmonary nodules again noted.    Left basilar pleural-parenchymal consolidation with obscuration of the left  hemidiaphragm similar to chest CT of 1/12/2024.    Top normal heart size.      Problem List  Patient Active Problem List   Diagnosis    Mild intermittent asthma without complication    Age-related osteoporosis without current pathological fracture    Parkinson disease (HCC) - diagnosed age 60    Seasonal allergies    History of recurrent UTIs    Family history of colon cancer in mother    Bradycardia    Abnormal EKG    S/P total knee replacement, left    Acute cough    Dyspnea    Primary cancer of left lower lobe of lung (HCC)    Mediastinal lymphadenopathy    Malignant neoplasm metastatic to both lungs (HCC)    Generalized weakness    Failure to thrive in adult    Severe malnutrition (HCC)    Metastasis to 
REPLACEMENT Left     LAPAROSCOPY      TONSILLECTOMY         Reason for Referral:  Nabor Valadez  was referred for a Speech Therapy evaluation to assess swallow function and/or communication.    History of Present Illness  Per MD notes:  \"Nabor Valadez is a 68 y.o. 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  Patient had been recently diagnosed with left-sided squamous cell lung cancer with metastasis to the opposite lung as well as brain.  Patient was scheduled for gamma knife surgery on February 20, 2024.  Has not received any sort of chemotherapy or radiation so far.  Was found to have metastasis to the brain and was taking dexamethasone for vasogenic edema.  Patient developed acid reflux and was started on PPI therapy.  At baseline patient is able to ambulate independently however over the past week patient has had progressive need for assistance in ambulation and physical activity.  Patient reports subsisting on protein shakes because she is unable to tolerate regular diet.  In the ER patient was hypertensive with a blood pressure of 192/99. Labs were largely unremarkable except for WBC of 25.9.  Chest x-ray showed left lower lobe consolidation consistent with pre-existing cancer.  Patient was admitted for further workup and treatment.\"    Imaging  XR CHEST PORTABLE   Final Result      Extensive multifocal pulmonary nodules again noted.      Left basilar pleural-parenchymal consolidation with obscuration of the left   hemidiaphragm similar to chest CT of 1/12/2024.      Top normal heart size.             Date of onset: 2/14/24    Current Diet:  ADULT DIET; Regular      Treatment 
denies pain  Pt denies any pain  Social/Functional History  Social/Functional History  Lives With: Spouse (4 kids ages 13-17)  Type of Home: House  Home Layout: Multi-level (no bedrooms or bathrooms on main level)  Home Access: Stairs to enter with rails (10 RADHA)  Bathroom Shower/Tub: Tub/Shower unit  Bathroom Toilet: Standard  Bathroom Equipment: Shower chair  Home Equipment: Walker, rolling  Has the patient had two or more falls in the past year or any fall with injury in the past year?: No  ADL Assistance: Needs assistance  Homemaking Assistance:  (\"everybody pitches in\")  Ambulation Assistance: Needs assistance (with rolling walker for the last few weeks, was able to walk independently until the last week has been needing more assist)  Transfer Assistance: Needs assistance  Active :  (not recently)  Additional Comments:  works.  Siddhartha has been assisting.       Objective                Safety Devices  Type of Devices: Call light within reach;Chair alarm in place;Left in chair;All fall risk precautions in place;Gait belt  Restraints  Restraints Initially in Place: No  Bed Mobility Training  Bed Mobility Training: No  Supine to Sit: Contact-guard assistance (with HOB elevated.)  Balance  Sitting: Intact  Standing: With support  Transfer Training  Transfer Training: Yes  Sit to Stand: Contact-guard assistance  Stand to Sit: Contact-guard assistance  Toilet Transfer: Contact-guard assistance  Gait Training  Gait Training: Yes  Gait  Gait Training: Yes  Overall Level of Assistance: Contact-guard assistance;Minimum assistance (slow and guarded but no significant LOB, cues for hand placement with RW.)  Distance (ft):  (short distances in hospital room, 85' x 1 repetition)  Assistive Device: Walker, rolling (with and without RW this date in room and hallway)  Gait Abnormalities:  (Pt ambulates with a slow step through gait pattern with NBOS, x 1 episode of mild L knee buckling however pt able to self 
gallops  Respiratory: No wheeze or crackles appreciated, equal air entry bilaterally  GI/: Abdomen soft, nontender, bowel sounds audible  Skin: No rashes or ulcers present  MSK: Peripheral pulses intact  /78   Pulse 55   Temp 98.4 °F (36.9 °C) (Oral)   Resp 16   Ht 1.6 m (5' 3\")   Wt 46.4 kg (102 lb 6.4 oz)   SpO2 96%   BMI 18.14 kg/m²     Diet: ADULT DIET; Regular  ADULT ORAL NUTRITION SUPPLEMENT; Breakfast, Lunch, Dinner; Plant Based Oral Supplement  DVT Prophylaxis: [x]PPx LMWH  []SQ Heparin  []IPC/SCDs  []Eliquis  []Xarelto  []Coumadin  []Other -      Code status: Full Code  PT/OT Eval Status:   []NOT yet ordered  [x]Ordered and Pending   []Seen with Recommendations for:  []Home independently  []Home w/ assist  []HHC  []SNF  []Acute Rehab    Anticipated Discharge Day/Date: 2 to 3 days    Anticipated Discharge Location: []Home  [x]HHC  [x]SNF  []Acute Rehab  []ECF  []LTAC  []Hospice  []Other -      Consults:      IP CONSULT TO ONCOLOGY  IP CONSULT TO DIETITIAN  IP CONSULT TO ONCOLOGY  IP CONSULT TO PALLIATIVE CARE  IP CONSULT TO RADIATION ONCOLOGY  IP CONSULT TO PHYSICAL MEDICINE REHAB          Medications:  Personally reviewed in detail in conjunction w/ labs as documented for evidence of drug toxicity.     Infusion Medications    sodium chloride       Scheduled Medications    fluticasone  1 spray Each Nostril Daily    gabapentin  100 mg Oral TID    Carbidopa-Levodopa ER  1 capsule Oral 6x Daily    sodium chloride flush  5-40 mL IntraVENous 2 times per day    enoxaparin  30 mg SubCUTAneous Daily    melatonin  6 mg Oral Nightly    dexAMETHasone  4 mg Oral 2 times per day    pantoprazole  40 mg Oral QAM AC    albuterol  2.5 mg Nebulization BID RT    sodium bicarbonate  1.5 mEq IntraDERmal Once    BUPivacaine (PF)  30 mL IntraDERmal Once    lidocaine  30 mL IntraDERmal Once    neomycin-bacitracin-polymyxin   Topical BID    sodium chloride  500 mL IntraVENous Once     PRN Meds: sodium chloride flush, 
BILIRUBINUR neg 10/31/2011 11:41 AM    BLOODU Negative 02/17/2024 05:01 PM    GLUCOSEU Negative 02/17/2024 05:01 PM    KETUA Negative 02/17/2024 05:01 PM     Urine Cultures: No results found for: \"LABURIN\"  Blood Cultures: No results found for: \"BC\"  No results found for: \"BLOODCULT2\"  Organism: No results found for: \"ORG\"      Electronically signed by Dalton Eldridge MD on 2/20/2024 at 4:04 PM   
chloride flush, sodium chloride, ondansetron **OR** ondansetron, polyethylene glycol, acetaminophen **OR** acetaminophen, calcium carbonate, albuterol, guaiFENesin-dextromethorphan, acetaminophen     Labs:  Personally reviewed and interpreted for clinical significance.     Recent Labs     02/15/24  0640 02/16/24  0737 02/17/24  0626   WBC 19.7* 21.2* 20.6*   HGB 10.6* 10.4* 11.0*   HCT 33.4* 32.9* 34.7*    299 317     Recent Labs     02/15/24  0640 02/16/24  0737 02/17/24  0626    139 139   K 3.6 3.7 4.0    103 102   CO2 25 24 23   BUN 17 16 18   CREATININE <0.5* <0.5* <0.5*   CALCIUM 9.3 8.9 9.3     No results for input(s): \"PROBNP\", \"TROPHS\" in the last 72 hours.    No results for input(s): \"LABA1C\" in the last 72 hours.  No results for input(s): \"AST\", \"ALT\", \"BILIDIR\", \"BILITOT\", \"ALKPHOS\" in the last 72 hours.    Recent Labs     02/14/24 2015   INR 1.24*       Urine Cultures: No results found for: \"LABURIN\"  Blood Cultures: No results found for: \"BC\"  No results found for: \"BLOODCULT2\"  Organism: No results found for: \"ORG\"      Mary Lou Gong MD  Please note: All documentation, including physical exam and assessment/plan, from prior was copied and carried forward from encounter on 2/16/2024.  New findings and changes have been reviewed and updated appropriately to reflect today's encounter.  
rescheduled as she needs to be out of the hospital x 72 hours prior   - Palliative Care following   - we reviewed Staging of her disease, palliative treatment intent, general management principles of metastatic disease. We discussed her prognosis with and without treatment. Again reviewed her systemic treatment plan is determined by the results of her molecular profiling, which have not yet returned. Discussed she would be given a follow up to see Dr. Ricks as an outpatient and she will have a treatment planning visit prior to starting treatment.      Failure to thrive   PT/OT recommended inpatient rehab   Difficult to know the etiology of her weakness but I suspect its a combination of FTT and her brain mets   Dietician consulted--appreciate recommendations. She is improving.   ARU stay denied by her insurance  She would benefit from SNF. We discussed that she cannot undergo any treatment until she is discharged from SNF.     Anemia   Leukocytosis   iron studies consistent with anemia of chronic disease   B12, Folate non contributory   Hgb improved to 11 today   Leukocytosis with elevated neutrophils is likely reactive from steroids       ONCOLOGIC DISPOSITION:  Per primary team   Discharge summary has been placed     SHANI Shell - CNP  Please contact through [employer]    50 mins total time was spent on this visit today   
x 2     Balance  Standing - Static:  (post lean/LOB initially upon standing.  Needed min to mod assist initially, progressed to CGA with walker)           OutComes Score                                                  AM-PAC - Mobility    AM-PAC Basic Mobility - Inpatient   How much help is needed turning from your back to your side while in a flat bed without using bedrails?: A Lot  How much help is needed moving from lying on your back to sitting on the side of a flat bed without using bedrails?: A Lot  How much help is needed moving to and from a bed to a chair?: A Little  How much help is needed standing up from a chair using your arms?: A Lot  How much help is needed walking in hospital room?: A Little  How much help is needed climbing 3-5 steps with a railing?: A Lot  AM-PAC Inpatient Mobility Raw Score : 14  AM-PAC Inpatient T-Scale Score : 38.1  Mobility Inpatient CMS 0-100% Score: 61.29  Mobility Inpatient CMS G-Code Modifier : CL         Tinneti Score       Goals  Short Term Goals  Time Frame for Short Term Goals: By discharge  Short Term Goal 1: Sup to sit supervision  Short Term Goal 2: Pt will transfer sit to stand SBA  Short Term Goal 3: Pt will amb >80' with LRAD SBA       Education  Patient Education  Education Given To: Patient  Education Provided: Role of Therapy;Plan of Care;IADL Safety  Education Provided Comments: need for assist, use of call light, importance of OOB  Education Method: Verbal  Education Outcome: Verbalized understanding;Continued education needed      Therapy Time   Individual Concurrent Group Co-treatment   Time In 1320         Time Out 1415         Minutes 55               Timed Code Treatment Minutes: 40      Total Treatment Minutes:  55    Lali Mehta, PT

## 2024-02-20 NOTE — PROCEDURES
INSTRUMENTAL SWALLOW REPORT  MODIFIED BARIUM SWALLOW  Speech Therapy Discharge    NAME: Nabor Valadez     : 1955  MRN: [id number]       Date of Eval: 2024     Ordering Physician: Dr. Wheeler  Referring Diagnosis: Dysphagia    Past Medical History:  has a past medical history of Age-related osteoporosis without current pathological fracture, Asthma, Bradycardia, Degenerative arthritis of left knee, History of recurrent UTIs, Lung cancer (HCC), Parkinson disease (HCC) - diagnosed age 60, Postmenopausal, and Urinary incontinence.  Past Surgical History:  has a past surgical history that includes laparoscopy; Tonsillectomy; Colonoscopy (2006); Colonoscopy (2015); Finger surgery (Bilateral); joint replacement (Left); and bronchoscopy (Left, 2024).    CXR: 2024  Extensive multifocal pulmonary nodules again noted.   Left basilar pleural-parenchymal consolidation with obscuration of the left  hemidiaphragm similar to chest CT of 2024.   Top normal heart size.    Prior MBS Results: NA    Patient Complaints/Reason for Referral:  Nabor Valadez was referred for a MBS to assess the efficiency of his/her swallow function, assess for aspiration, and to make recommendations regarding safe dietary consistencies, effective compensatory strategies, and safe eating environment.    Onset of problem:   2024    Pain   None indicated by any means.    Subjective:  Awake, alert, pleasant. Seated upright on stretcher, on room air.     Impressions:  Oral Phase  WFL; appropriate oral acceptance, containment, timely mastication and A-P transit, no significant stasis.  Pharyngeal Phase  WFL; timely and appropriate hyolaryngeal mechanics, with adequate airway protection. No penetration, no aspiration, no significant stasis.  Upper Esophageal Screen  Indirectly assessed; appeared unremarkable.    Treatment Dx and ICD 10: dysphagia  Position: Lateral and

## 2024-02-20 NOTE — CARE COORDINATION
CM  following for  d/c planning:    Patient plans to d/c  to [facility] .    MBS completed: this AM and   can  transfer to ARU if  medically cleared:    CM  spoke with RN who said pt  was  back from testing and  cleared to  discharge.    Discharge Disposition:  [facility]  [address] , [place] [address] [phone]    REPORT:  [phone]  Fax:  [phone]    Admission liaison requested  transport  between  4:30 -5:00 PM    Transportation requested  through  Round trip.    Electronically signed by Ashley Valderrama RN on 2/20/2024 at 1:04 PM     +++++++++++++++++++++++++++++++++++++++++++++++++++           Case Management Assessment            Discharge Note                    Date / Time of Note: 2/20/2024 1:08 PM                  Discharge Note Completed by: Ashley Valderrama RN    Patient Name: Nabor Valadez   YOB: 1955  Diagnosis: Failure to thrive in adult [R62.7]  Primary malignant neoplasm of lung metastatic to other site, unspecified laterality (HCC) [C34.90]   Date / Time: 2/14/2024  8:32 AM    Current PCP: Edenilson Munroe,   Clinic patient: No    Hospitalization in the last 30 days: No       Advance Directives:  Code Status: Full Code  [place] DNR form completed and on chart: No    Financial:  Payor: [facility] MEDICARE / Plan: formerly [facility] MEDICARE ADVANTAGE / Product Type: *No Product type* /      Pharmacy:    [id number] - [place], [place] - [address] - P [phone] - F [phone]  [address]  [facility] [address]  Phone: [phone] Fax: [phone]      Assistance purchasing medications?: Potential Assistance Purchasing Medications: No  Assistance provided by Case Management: None at this time    Does patient want to participate in local refill/ meds to beds program?:      Meds To Beds General Rules:  1. Can ONLY be done Monday- Friday between 8:30am-5pm  2. Prescription(s) must be in

## 2024-02-20 NOTE — DISCHARGE INSTR - COC
Continuity of Care Form    Patient Name: Nabor Valadez   :  1955  MRN:  [id number]    Admit date:  2024  Discharge date:  2024    Code Status Order: Full Code   Advance Directives:     Admitting Physician:  Mary Lou Gong MD  PCP: Edenilson Munroe DO    Discharging Nurse:   Discharging Hospital Unit/Room#: 6308/6308-01  Discharging Unit Phone Number: [phone]    Emergency Contact:   Extended Emergency Contact Information  Primary Emergency Contact: Marito Valadez  Address: [address] [place] [address] [place]  Home Phone: [phone]  Work Phone: [phone]  Relation: Spouse    Past Surgical History:  Past Surgical History:   Procedure Laterality Date    BRONCHOSCOPY Left 2024    BRONCHOSCOPY WITH ENDOBRONCHIAL ULTRASOUND MASS OF LOWER LOBE OF LEFT LUNG, MEDIASTINAL LYMPHADENOPATHY, LEFT LOWER LOBE LUNG MASS BIOPSY performed by Kevin Cotton MD at [facility] ENDOSCOPY    COLONOSCOPY  2006    Normal, repeat  due to (+)   Hilario    COLONOSCOPY  2015    Dr. Kennedy; one polyp, benign; repeat     FINGER SURGERY Bilateral     Trigger finger repair    JOINT REPLACEMENT Left     LAPAROSCOPY      TONSILLECTOMY         Immunization History:   Immunization History   Administered Date(s) Administered    COVID-19, PFIZER PURPLE top, DILUTE for use, (age 12 y+), 30mcg/0.3mL 2021, 2021, 2021    Influenza Virus Vaccine 2016    Influenza, AFLURIA (age 3 yrs+), FLUZONE, (age 6 mo+), MDV, 0.5mL 2016    Influenza, FLUAD, (age 65 y+), Adjuvanted, 0.5mL 2021, 2022, 2023    Pneumococcal, PCV20, PREVNAR 20, (age 6w+), IM, 0.5mL 2023    Pneumococcal, PPSV23, PNEUMOVAX 23, (age 2y+), SC/IM, 0.5mL 2021    TDaP, ADACEL (age 10y-64y), BOOSTRIX (age 10y+), IM, 0.5mL 11/15/2010, 2021       Active Problems:  Patient Active Problem List   Diagnosis Code    Mild intermittent asthma

## 2024-02-20 NOTE — PLAN OF CARE
Problem: Discharge Planning  Goal: Discharge to home or other facility with appropriate resources  Outcome: Progressing     Problem: Pain  Goal: Verbalizes/displays adequate comfort level or baseline comfort level  Outcome: Progressing     Problem: Skin/Tissue Integrity  Goal: Absence of new skin breakdown  Description: 1.  Monitor for areas of redness and/or skin breakdown  2.  Assess vascular access sites hourly  3.  Every 4-6 hours minimum:  Change oxygen saturation probe site  4.  Every 4-6 hours:  If on nasal continuous positive airway pressure, respiratory therapy assess nares and determine need for appliance change or resting period.  Outcome: Progressing     Problem: ABCDS Injury Assessment  Goal: Absence of physical injury  Outcome: Progressing     Problem: Safety - Adult  Goal: Free from fall injury  Outcome: Progressing     Problem: Nutrition Deficit:  Goal: Optimize nutritional status  Outcome: Progressing     
  Problem: Discharge Planning  Goal: Discharge to home or other facility with appropriate resources  Outcome: Progressing   Note: Patient discharge to be determined    Problem: Pain  Goal: Verbalizes/displays adequate comfort level or baseline comfort level  Outcome: Progressing   Note: Pain adequately controlled    Problem: Skin/Tissue Integrity  Goal: Absence of new skin breakdown  Description: 1.  Monitor for areas of redness and/or skin breakdown  2.  Assess vascular access sites hourly  3.  Every 4-6 hours minimum:  Change oxygen saturation probe site  4.  Every 4-6 hours:  If on nasal continuous positive airway pressure, respiratory therapy assess nares and determine need for appliance change or resting period.  Outcome: Progressing   Note: No new skin breakdown    Problem: ABCDS Injury Assessment  Goal: Absence of physical injury  Outcome: Progressing     Problem: Safety - Adult  Goal: Free from fall injury  Outcome: Progressing   Note: Free from falls    Problem: Nutrition Deficit:  Goal: Optimize nutritional status  Outcome: Progressing     
  Problem: Discharge Planning  Goal: Discharge to home or other facility with appropriate resources  Outcome: Progressing  Flowsheets (Taken 2/14/2024 1849)  Discharge to home or other facility with appropriate resources:   Identify barriers to discharge with patient and caregiver   Arrange for needed discharge resources and transportation as appropriate   Refer to discharge planning if patient needs post-hospital services based on physician order or complex needs related to functional status, cognitive ability or social support system   Identify discharge learning needs (meds, wound care, etc)     Problem: Pain  Goal: Verbalizes/displays adequate comfort level or baseline comfort level  Outcome: Progressing  Flowsheets (Taken 2/14/2024 1849)  Verbalizes/displays adequate comfort level or baseline comfort level:   Encourage patient to monitor pain and request assistance   Administer analgesics based on type and severity of pain and evaluate response   Assess pain using appropriate pain scale   Implement non-pharmacological measures as appropriate and evaluate response   Consider cultural and social influences on pain and pain management     Problem: Skin/Tissue Integrity  Goal: Absence of new skin breakdown  Description: 1.  Monitor for areas of redness and/or skin breakdown  2.  Assess vascular access sites hourly  3.  Every 4-6 hours minimum:  Change oxygen saturation probe site  4.  Every 4-6 hours:  If on nasal continuous positive airway pressure, respiratory therapy assess nares and determine need for appliance change or resting period.  Outcome: Progressing     Problem: ABCDS Injury Assessment  Goal: Absence of physical injury  Outcome: Progressing  Flowsheets (Taken 2/14/2024 1849)  Absence of Physical Injury: Implement safety measures based on patient assessment     Problem: Safety - Adult  Goal: Free from fall injury  Outcome: Progressing  Flowsheets (Taken 2/14/2024 1849)  Free From Fall Injury:   Instruct 
  Problem: Pain  Goal: Verbalizes/displays adequate comfort level or baseline comfort level  Outcome: Progressing  Note:  Patient denies pain/needs.     Problem: Safety - Adult  Goal: Free from fall injury  Outcome: Progressing  Note:  Remains free from falls, and patient is steady with ambulation and uses a walker for mobility without difficulty.     
  Problem: SLP Adult - Impaired Swallowing  Goal: By Discharge: Advance to least restrictive diet without signs or symptoms of aspiration for planned discharge setting.  See evaluation for individualized goals.  Outcome: Progressing       Clinical swallow evaluation completed.   Please see EMR for full report/recs.         Hiwot Carrero MA, CCC-SLP  [id number]  Speech-Language Pathologist    
  Problem: Safety - Adult  Goal: Free from fall injury  Outcome: Progressing  Flowsheets (Taken 2/18/2024 2307)  Free From Fall Injury:   Instruct family/caregiver on patient safety   Based on caregiver fall risk screen, instruct family/caregiver to ask for assistance with transferring infant if caregiver noted to have fall risk factors  Note: Assisted pt to bathroom. Plan ongoing.     
Problem: Discharge Planning  Goal: Discharge to home or other facility with appropriate resources  Outcome: Progressing     Problem: Pain  Goal: Verbalizes/displays adequate comfort level or baseline comfort level  Outcome: Progressing     Problem: Skin/Tissue Integrity  Goal: Absence of new skin breakdown  Description: 1.  Monitor for areas of redness and/or skin breakdown  2.  Assess vascular access sites hourly  3.  Every 4-6 hours minimum:  Change oxygen saturation probe site  4.  Every 4-6 hours:  If on nasal continuous positive airway pressure, respiratory therapy assess nares and determine need for appliance change or resting period.  Outcome: Progressing     Problem: ABCDS Injury Assessment  Goal: Absence of physical injury  Outcome: Progressing  Flowsheets (Taken 2/17/2024 2000)  Absence of Physical Injury: Implement safety measures based on patient assessment     Problem: Safety - Adult  Goal: Free from fall injury  Outcome: Progressing  Flowsheets (Taken 2/17/2024 2000)  Free From Fall Injury: Instruct family/caregiver on patient safety     Problem: Nutrition Deficit:  Goal: Optimize nutritional status  Outcome: Progressing     Continue with plan of care.   
resources  2/14/2024 2044 by Nba Samaniego, RN  Outcome: Progressing

## 2024-02-21 LAB
Lab: NORMAL
REPORT: NORMAL
THIS TEST SENT TO: NORMAL

## 2024-02-27 ENCOUNTER — APPOINTMENT (OUTPATIENT)
Dept: GENERAL RADIOLOGY | Age: 69
End: 2024-02-27
Payer: MEDICARE

## 2024-02-27 ENCOUNTER — APPOINTMENT (OUTPATIENT)
Dept: CT IMAGING | Age: 69
End: 2024-02-27
Payer: MEDICARE

## 2024-02-27 ENCOUNTER — HOSPITAL ENCOUNTER (INPATIENT)
Age: 69
LOS: 2 days | Discharge: HOSPICE | End: 2024-03-01
Attending: EMERGENCY MEDICINE | Admitting: FAMILY MEDICINE
Payer: MEDICARE

## 2024-02-27 DIAGNOSIS — C78.01 MALIGNANT NEOPLASM METASTATIC TO BOTH LUNGS (HCC): ICD-10-CM

## 2024-02-27 DIAGNOSIS — C78.02 MALIGNANT NEOPLASM METASTATIC TO BOTH LUNGS (HCC): ICD-10-CM

## 2024-02-27 DIAGNOSIS — J96.01 ACUTE RESPIRATORY FAILURE WITH HYPOXIA (HCC): Primary | ICD-10-CM

## 2024-02-27 LAB
ANION GAP SERPL CALCULATED.3IONS-SCNC: 13 MMOL/L (ref 3–16)
BASE EXCESS BLDV CALC-SCNC: 3 MMOL/L (ref -2–3)
BASOPHILS # BLD: 0.1 K/UL (ref 0–0.2)
BASOPHILS NFR BLD: 0.2 %
BUN SERPL-MCNC: 20 MG/DL (ref 7–20)
CALCIUM SERPL-MCNC: 8.6 MG/DL (ref 8.3–10.6)
CHLORIDE SERPL-SCNC: 101 MMOL/L (ref 99–110)
CO2 BLDV-SCNC: 27 MMOL/L
CO2 SERPL-SCNC: 23 MMOL/L (ref 21–32)
COHGB MFR BLDV: 1.3 % (ref 0–1.5)
CREAT SERPL-MCNC: <0.5 MG/DL (ref 0.6–1.2)
DEPRECATED RDW RBC AUTO: 16.9 % (ref 12.4–15.4)
DO-HGB MFR BLDV: 13.5 %
EOSINOPHIL # BLD: 0 K/UL (ref 0–0.6)
EOSINOPHIL NFR BLD: 0 %
FLUAV RNA RESP QL NAA+PROBE: NOT DETECTED
FLUBV RNA RESP QL NAA+PROBE: NOT DETECTED
GFR SERPLBLD CREATININE-BSD FMLA CKD-EPI: >60 ML/MIN/{1.73_M2}
GLUCOSE SERPL-MCNC: 148 MG/DL (ref 70–99)
HCO3 BLDV-SCNC: 26 MMOL/L (ref 24–28)
HCT VFR BLD AUTO: 35.3 % (ref 36–48)
HGB BLD-MCNC: 11.3 G/DL (ref 12–16)
LACTATE BLDV-SCNC: 2.7 MMOL/L (ref 0.4–2)
LYMPHOCYTES # BLD: 0.6 K/UL (ref 1–5.1)
LYMPHOCYTES NFR BLD: 1.8 %
MAGNESIUM SERPL-MCNC: 1.7 MG/DL (ref 1.8–2.4)
MCH RBC QN AUTO: 28.3 PG (ref 26–34)
MCHC RBC AUTO-ENTMCNC: 32.1 G/DL (ref 31–36)
MCV RBC AUTO: 87.9 FL (ref 80–100)
METHGB MFR BLDV: <0 % (ref 0–1.5)
MONOCYTES # BLD: 1 K/UL (ref 0–1.3)
MONOCYTES NFR BLD: 3 %
NEUTROPHILS # BLD: 33.3 K/UL (ref 1.7–7.7)
NEUTROPHILS NFR BLD: 95 %
NT-PROBNP SERPL-MCNC: 378 PG/ML (ref 0–124)
PCO2 BLDV: 34.5 MMHG (ref 41–51)
PH BLDV: 7.49 [PH] (ref 7.35–7.45)
PLATELET # BLD AUTO: 234 K/UL (ref 135–450)
PMV BLD AUTO: 7.4 FL (ref 5–10.5)
PO2 BLDV: 52.3 MMHG (ref 25–40)
POTASSIUM SERPL-SCNC: 4.5 MMOL/L (ref 3.5–5.1)
RBC # BLD AUTO: 4.01 M/UL (ref 4–5.2)
SAO2 % BLDV: 86 %
SARS-COV-2 RNA RESP QL NAA+PROBE: NOT DETECTED
SODIUM SERPL-SCNC: 137 MMOL/L (ref 136–145)
TROPONIN, HIGH SENSITIVITY: 13 NG/L (ref 0–14)
TROPONIN, HIGH SENSITIVITY: 15 NG/L (ref 0–14)
WBC # BLD AUTO: 35.1 K/UL (ref 4–11)

## 2024-02-27 PROCEDURE — 87636 SARSCOV2 & INF A&B AMP PRB: CPT

## 2024-02-27 PROCEDURE — 83880 ASSAY OF NATRIURETIC PEPTIDE: CPT

## 2024-02-27 PROCEDURE — 84484 ASSAY OF TROPONIN QUANT: CPT

## 2024-02-27 PROCEDURE — 71260 CT THORAX DX C+: CPT

## 2024-02-27 PROCEDURE — 36415 COLL VENOUS BLD VENIPUNCTURE: CPT

## 2024-02-27 PROCEDURE — 93005 ELECTROCARDIOGRAM TRACING: CPT | Performed by: EMERGENCY MEDICINE

## 2024-02-27 PROCEDURE — 85025 COMPLETE CBC W/AUTO DIFF WBC: CPT

## 2024-02-27 PROCEDURE — 83605 ASSAY OF LACTIC ACID: CPT

## 2024-02-27 PROCEDURE — 70450 CT HEAD/BRAIN W/O DYE: CPT

## 2024-02-27 PROCEDURE — 83735 ASSAY OF MAGNESIUM: CPT

## 2024-02-27 PROCEDURE — 71045 X-RAY EXAM CHEST 1 VIEW: CPT

## 2024-02-27 PROCEDURE — 99285 EMERGENCY DEPT VISIT HI MDM: CPT

## 2024-02-27 PROCEDURE — 80048 BASIC METABOLIC PNL TOTAL CA: CPT

## 2024-02-27 PROCEDURE — 82803 BLOOD GASES ANY COMBINATION: CPT

## 2024-02-27 PROCEDURE — 6360000004 HC RX CONTRAST MEDICATION: Performed by: EMERGENCY MEDICINE

## 2024-02-27 RX ADMIN — IOPAMIDOL 75 ML: 755 INJECTION, SOLUTION INTRAVENOUS at 22:40

## 2024-02-27 ASSESSMENT — PAIN - FUNCTIONAL ASSESSMENT: PAIN_FUNCTIONAL_ASSESSMENT: 0-10

## 2024-02-27 ASSESSMENT — PAIN SCALES - GENERAL: PAINLEVEL_OUTOF10: 0

## 2024-02-28 PROBLEM — R09.02 HYPOXIA: Status: ACTIVE | Noted: 2024-02-28

## 2024-02-28 LAB
ANION GAP SERPL CALCULATED.3IONS-SCNC: 10 MMOL/L (ref 3–16)
BASOPHILS # BLD: 0.1 K/UL (ref 0–0.2)
BASOPHILS NFR BLD: 0.3 %
BILIRUB UR QL STRIP.AUTO: NEGATIVE
BUN SERPL-MCNC: 15 MG/DL (ref 7–20)
CALCIUM SERPL-MCNC: 9.2 MG/DL (ref 8.3–10.6)
CHLORIDE SERPL-SCNC: 99 MMOL/L (ref 99–110)
CLARITY UR: CLEAR
CO2 SERPL-SCNC: 25 MMOL/L (ref 21–32)
COLOR UR: YELLOW
CREAT SERPL-MCNC: <0.5 MG/DL (ref 0.6–1.2)
DEPRECATED RDW RBC AUTO: 17.1 % (ref 12.4–15.4)
EKG ATRIAL RATE: 75 BPM
EKG DIAGNOSIS: NORMAL
EKG P AXIS: 56 DEGREES
EKG P-R INTERVAL: 130 MS
EKG Q-T INTERVAL: 348 MS
EKG QRS DURATION: 76 MS
EKG QTC CALCULATION (BAZETT): 388 MS
EKG R AXIS: 57 DEGREES
EKG T AXIS: 54 DEGREES
EKG VENTRICULAR RATE: 75 BPM
EOSINOPHIL # BLD: 0.2 K/UL (ref 0–0.6)
EOSINOPHIL NFR BLD: 0.7 %
EPI CELLS #/AREA URNS HPF: NORMAL /HPF (ref 0–5)
GFR SERPLBLD CREATININE-BSD FMLA CKD-EPI: >60 ML/MIN/{1.73_M2}
GLUCOSE SERPL-MCNC: 111 MG/DL (ref 70–99)
GLUCOSE UR STRIP.AUTO-MCNC: NEGATIVE MG/DL
HCT VFR BLD AUTO: 36.3 % (ref 36–48)
HGB BLD-MCNC: 12 G/DL (ref 12–16)
HGB UR QL STRIP.AUTO: ABNORMAL
KETONES UR STRIP.AUTO-MCNC: NEGATIVE MG/DL
LACTATE BLDV-SCNC: 1.7 MMOL/L (ref 0.4–2)
LEUKOCYTE ESTERASE UR QL STRIP.AUTO: NEGATIVE
LYMPHOCYTES # BLD: 1.4 K/UL (ref 1–5.1)
LYMPHOCYTES NFR BLD: 4.8 %
MAGNESIUM SERPL-MCNC: 2.2 MG/DL (ref 1.8–2.4)
MCH RBC QN AUTO: 29 PG (ref 26–34)
MCHC RBC AUTO-ENTMCNC: 33.1 G/DL (ref 31–36)
MCV RBC AUTO: 87.5 FL (ref 80–100)
MONOCYTES # BLD: 1.5 K/UL (ref 0–1.3)
MONOCYTES NFR BLD: 5.2 %
NEUTROPHILS # BLD: 26.1 K/UL (ref 1.7–7.7)
NEUTROPHILS NFR BLD: 89 %
NITRITE UR QL STRIP.AUTO: NEGATIVE
PH UR STRIP.AUTO: 7 [PH] (ref 5–8)
PLATELET # BLD AUTO: 221 K/UL (ref 135–450)
PMV BLD AUTO: 7 FL (ref 5–10.5)
POTASSIUM SERPL-SCNC: 4.5 MMOL/L (ref 3.5–5.1)
PROCALCITONIN SERPL IA-MCNC: 0.22 NG/ML (ref 0–0.15)
PROT UR STRIP.AUTO-MCNC: 30 MG/DL
RBC # BLD AUTO: 4.15 M/UL (ref 4–5.2)
RBC #/AREA URNS HPF: NORMAL /HPF (ref 0–4)
SODIUM SERPL-SCNC: 134 MMOL/L (ref 136–145)
SP GR UR STRIP.AUTO: 1.01 (ref 1–1.03)
UA COMPLETE W REFLEX CULTURE PNL UR: ABNORMAL
UA DIPSTICK W REFLEX MICRO PNL UR: YES
URN SPEC COLLECT METH UR: ABNORMAL
UROBILINOGEN UR STRIP-ACNC: 0.2 E.U./DL
WBC # BLD AUTO: 29.3 K/UL (ref 4–11)
WBC #/AREA URNS HPF: NORMAL /HPF (ref 0–5)

## 2024-02-28 PROCEDURE — 6360000002 HC RX W HCPCS

## 2024-02-28 PROCEDURE — 85025 COMPLETE CBC W/AUTO DIFF WBC: CPT

## 2024-02-28 PROCEDURE — 97162 PT EVAL MOD COMPLEX 30 MIN: CPT

## 2024-02-28 PROCEDURE — 6370000000 HC RX 637 (ALT 250 FOR IP)

## 2024-02-28 PROCEDURE — 87040 BLOOD CULTURE FOR BACTERIA: CPT

## 2024-02-28 PROCEDURE — 2580000003 HC RX 258: Performed by: ANESTHESIOLOGY

## 2024-02-28 PROCEDURE — 1200000000 HC SEMI PRIVATE

## 2024-02-28 PROCEDURE — 83735 ASSAY OF MAGNESIUM: CPT

## 2024-02-28 PROCEDURE — 97530 THERAPEUTIC ACTIVITIES: CPT

## 2024-02-28 PROCEDURE — 36415 COLL VENOUS BLD VENIPUNCTURE: CPT

## 2024-02-28 PROCEDURE — 97116 GAIT TRAINING THERAPY: CPT

## 2024-02-28 PROCEDURE — 2580000003 HC RX 258

## 2024-02-28 PROCEDURE — 83605 ASSAY OF LACTIC ACID: CPT

## 2024-02-28 PROCEDURE — 80048 BASIC METABOLIC PNL TOTAL CA: CPT

## 2024-02-28 PROCEDURE — 97535 SELF CARE MNGMENT TRAINING: CPT

## 2024-02-28 PROCEDURE — 87641 MR-STAPH DNA AMP PROBE: CPT

## 2024-02-28 PROCEDURE — 84145 PROCALCITONIN (PCT): CPT

## 2024-02-28 PROCEDURE — 81001 URINALYSIS AUTO W/SCOPE: CPT

## 2024-02-28 PROCEDURE — 97166 OT EVAL MOD COMPLEX 45 MIN: CPT

## 2024-02-28 RX ORDER — ALBUTEROL SULFATE 90 UG/1
2 AEROSOL, METERED RESPIRATORY (INHALATION)
Status: DISCONTINUED | OUTPATIENT
Start: 2024-02-28 | End: 2024-02-28

## 2024-02-28 RX ORDER — ACETAMINOPHEN 325 MG/1
650 TABLET ORAL EVERY 4 HOURS PRN
Status: CANCELLED | OUTPATIENT
Start: 2024-02-28

## 2024-02-28 RX ORDER — ONDANSETRON 2 MG/ML
4 INJECTION INTRAMUSCULAR; INTRAVENOUS EVERY 6 HOURS PRN
Status: DISCONTINUED | OUTPATIENT
Start: 2024-02-28 | End: 2024-03-01 | Stop reason: HOSPADM

## 2024-02-28 RX ORDER — 0.9 % SODIUM CHLORIDE 0.9 %
500 INTRAVENOUS SOLUTION INTRAVENOUS ONCE
Status: CANCELLED | OUTPATIENT
Start: 2024-02-28 | End: 2024-02-28

## 2024-02-28 RX ORDER — CARBIDOPA AND LEVODOPA 25; 100 MG/1; MG/1
2 TABLET, EXTENDED RELEASE ORAL ONCE
Status: COMPLETED | OUTPATIENT
Start: 2024-02-28 | End: 2024-02-28

## 2024-02-28 RX ORDER — SODIUM CHLORIDE 9 MG/ML
INJECTION, SOLUTION INTRAVENOUS PRN
Status: DISCONTINUED | OUTPATIENT
Start: 2024-02-28 | End: 2024-03-01 | Stop reason: HOSPADM

## 2024-02-28 RX ORDER — 0.9 % SODIUM CHLORIDE 0.9 %
500 INTRAVENOUS SOLUTION INTRAVENOUS ONCE
Status: DISCONTINUED | OUTPATIENT
Start: 2024-02-28 | End: 2024-03-01 | Stop reason: HOSPADM

## 2024-02-28 RX ORDER — CETIRIZINE HYDROCHLORIDE 10 MG/1
10 TABLET ORAL DAILY
Status: DISCONTINUED | OUTPATIENT
Start: 2024-02-28 | End: 2024-03-01 | Stop reason: HOSPADM

## 2024-02-28 RX ORDER — MAGNESIUM SULFATE IN WATER 40 MG/ML
2000 INJECTION, SOLUTION INTRAVENOUS ONCE
Status: COMPLETED | OUTPATIENT
Start: 2024-02-28 | End: 2024-02-28

## 2024-02-28 RX ORDER — MULTIVIT-MIN/FERROUS GLUCONATE 9 MG/15 ML
15 LIQUID (ML) ORAL DAILY
Status: DISCONTINUED | OUTPATIENT
Start: 2024-02-28 | End: 2024-03-01 | Stop reason: HOSPADM

## 2024-02-28 RX ORDER — BUPIVACAINE HYDROCHLORIDE 5 MG/ML
30 INJECTION, SOLUTION EPIDURAL; INTRACAUDAL ONCE
Status: DISCONTINUED | OUTPATIENT
Start: 2024-02-28 | End: 2024-02-28 | Stop reason: HOSPADM

## 2024-02-28 RX ORDER — IPRATROPIUM BROMIDE AND ALBUTEROL SULFATE 2.5; .5 MG/3ML; MG/3ML
1 SOLUTION RESPIRATORY (INHALATION)
Status: DISCONTINUED | OUTPATIENT
Start: 2024-02-28 | End: 2024-02-28

## 2024-02-28 RX ORDER — ACETAMINOPHEN 325 MG/1
650 TABLET ORAL EVERY 6 HOURS PRN
Status: DISCONTINUED | OUTPATIENT
Start: 2024-02-28 | End: 2024-03-01 | Stop reason: HOSPADM

## 2024-02-28 RX ORDER — HYDROXYZINE HYDROCHLORIDE 25 MG/1
50 TABLET, FILM COATED ORAL
Status: ACTIVE | OUTPATIENT
Start: 2024-02-28 | End: 2024-02-29

## 2024-02-28 RX ORDER — ENOXAPARIN SODIUM 100 MG/ML
30 INJECTION SUBCUTANEOUS DAILY
Status: DISCONTINUED | OUTPATIENT
Start: 2024-02-28 | End: 2024-03-01 | Stop reason: HOSPADM

## 2024-02-28 RX ORDER — ONDANSETRON 4 MG/1
4 TABLET, ORALLY DISINTEGRATING ORAL EVERY 8 HOURS PRN
Status: DISCONTINUED | OUTPATIENT
Start: 2024-02-28 | End: 2024-03-01 | Stop reason: HOSPADM

## 2024-02-28 RX ORDER — CARBIDOPA AND LEVODOPA 50; 200 MG/1; MG/1
1 TABLET, EXTENDED RELEASE ORAL EVERY 4 HOURS
Status: DISCONTINUED | OUTPATIENT
Start: 2024-02-28 | End: 2024-02-28 | Stop reason: CLARIF

## 2024-02-28 RX ORDER — SODIUM BICARBONATE 42 MG/ML
1.5 INJECTION, SOLUTION INTRAVENOUS ONCE
Status: CANCELLED | OUTPATIENT
Start: 2024-02-28 | End: 2024-02-28

## 2024-02-28 RX ORDER — FLUTICASONE PROPIONATE 50 MCG
1 SPRAY, SUSPENSION (ML) NASAL DAILY
Status: DISCONTINUED | OUTPATIENT
Start: 2024-02-28 | End: 2024-03-01 | Stop reason: HOSPADM

## 2024-02-28 RX ORDER — IBUPROFEN 200 MG
TABLET ORAL 2 TIMES DAILY
Status: CANCELLED | OUTPATIENT
Start: 2024-02-28

## 2024-02-28 RX ORDER — ASCORBIC ACID 500 MG
500 TABLET ORAL DAILY
Status: DISCONTINUED | OUTPATIENT
Start: 2024-02-28 | End: 2024-03-01 | Stop reason: HOSPADM

## 2024-02-28 RX ORDER — DEXAMETHASONE SODIUM PHOSPHATE 4 MG/ML
4 INJECTION, SOLUTION INTRA-ARTICULAR; INTRALESIONAL; INTRAMUSCULAR; INTRAVENOUS; SOFT TISSUE ONCE
Status: CANCELLED | OUTPATIENT
Start: 2024-02-28 | End: 2024-02-28

## 2024-02-28 RX ORDER — SODIUM CHLORIDE, SODIUM LACTATE, POTASSIUM CHLORIDE, CALCIUM CHLORIDE 600; 310; 30; 20 MG/100ML; MG/100ML; MG/100ML; MG/100ML
INJECTION, SOLUTION INTRAVENOUS CONTINUOUS
Status: DISCONTINUED | OUTPATIENT
Start: 2024-02-28 | End: 2024-03-01 | Stop reason: HOSPADM

## 2024-02-28 RX ORDER — LOSARTAN POTASSIUM 50 MG/1
50 TABLET ORAL DAILY
Status: DISCONTINUED | OUTPATIENT
Start: 2024-02-28 | End: 2024-03-01 | Stop reason: HOSPADM

## 2024-02-28 RX ORDER — PANTOPRAZOLE SODIUM 40 MG/1
40 TABLET, DELAYED RELEASE ORAL
Status: DISCONTINUED | OUTPATIENT
Start: 2024-02-28 | End: 2024-03-01 | Stop reason: HOSPADM

## 2024-02-28 RX ORDER — SODIUM CHLORIDE 0.9 % (FLUSH) 0.9 %
5-40 SYRINGE (ML) INJECTION PRN
Status: DISCONTINUED | OUTPATIENT
Start: 2024-02-28 | End: 2024-03-01 | Stop reason: HOSPADM

## 2024-02-28 RX ORDER — DEXAMETHASONE SODIUM PHOSPHATE 4 MG/ML
4 INJECTION, SOLUTION INTRA-ARTICULAR; INTRALESIONAL; INTRAMUSCULAR; INTRAVENOUS; SOFT TISSUE ONCE
Status: DISCONTINUED | OUTPATIENT
Start: 2024-02-28 | End: 2024-02-28 | Stop reason: HOSPADM

## 2024-02-28 RX ORDER — ACETAMINOPHEN 325 MG/1
650 TABLET ORAL EVERY 4 HOURS PRN
Status: DISCONTINUED | OUTPATIENT
Start: 2024-02-28 | End: 2024-02-28

## 2024-02-28 RX ORDER — ONDANSETRON 2 MG/ML
4 INJECTION INTRAMUSCULAR; INTRAVENOUS EVERY 6 HOURS PRN
Status: DISCONTINUED | OUTPATIENT
Start: 2024-02-28 | End: 2024-02-28

## 2024-02-28 RX ORDER — SODIUM CHLORIDE, SODIUM LACTATE, POTASSIUM CHLORIDE, CALCIUM CHLORIDE 600; 310; 30; 20 MG/100ML; MG/100ML; MG/100ML; MG/100ML
INJECTION, SOLUTION INTRAVENOUS CONTINUOUS
Status: ACTIVE | OUTPATIENT
Start: 2024-02-28 | End: 2024-02-28

## 2024-02-28 RX ORDER — SODIUM CHLORIDE 0.9 % (FLUSH) 0.9 %
5-40 SYRINGE (ML) INJECTION EVERY 12 HOURS SCHEDULED
Status: DISCONTINUED | OUTPATIENT
Start: 2024-02-28 | End: 2024-03-01 | Stop reason: HOSPADM

## 2024-02-28 RX ORDER — POLYETHYLENE GLYCOL 3350 17 G/17G
17 POWDER, FOR SOLUTION ORAL DAILY PRN
Status: DISCONTINUED | OUTPATIENT
Start: 2024-02-28 | End: 2024-03-01 | Stop reason: HOSPADM

## 2024-02-28 RX ORDER — IBUPROFEN 200 MG
TABLET ORAL 2 TIMES DAILY
Status: DISCONTINUED | OUTPATIENT
Start: 2024-02-28 | End: 2024-03-01 | Stop reason: HOSPADM

## 2024-02-28 RX ORDER — DEXAMETHASONE SODIUM PHOSPHATE 4 MG/ML
4 INJECTION, SOLUTION INTRA-ARTICULAR; INTRALESIONAL; INTRAMUSCULAR; INTRAVENOUS; SOFT TISSUE ONCE
Status: DISCONTINUED | OUTPATIENT
Start: 2024-02-28 | End: 2024-02-28

## 2024-02-28 RX ORDER — ACETAMINOPHEN 650 MG/1
650 SUPPOSITORY RECTAL EVERY 6 HOURS PRN
Status: DISCONTINUED | OUTPATIENT
Start: 2024-02-28 | End: 2024-03-01 | Stop reason: HOSPADM

## 2024-02-28 RX ORDER — LIDOCAINE HYDROCHLORIDE 10 MG/ML
30 INJECTION, SOLUTION INFILTRATION; PERINEURAL ONCE
Status: CANCELLED | OUTPATIENT
Start: 2024-02-28 | End: 2024-02-28

## 2024-02-28 RX ORDER — IPRATROPIUM BROMIDE AND ALBUTEROL SULFATE 2.5; .5 MG/3ML; MG/3ML
1 SOLUTION RESPIRATORY (INHALATION) EVERY 4 HOURS PRN
Status: DISCONTINUED | OUTPATIENT
Start: 2024-02-28 | End: 2024-03-01 | Stop reason: HOSPADM

## 2024-02-28 RX ORDER — DEXAMETHASONE 4 MG/1
4 TABLET ORAL EVERY 12 HOURS SCHEDULED
Status: DISCONTINUED | OUTPATIENT
Start: 2024-02-28 | End: 2024-03-01 | Stop reason: HOSPADM

## 2024-02-28 RX ORDER — LIDOCAINE HYDROCHLORIDE 10 MG/ML
30 INJECTION, SOLUTION INFILTRATION; PERINEURAL ONCE
Status: DISCONTINUED | OUTPATIENT
Start: 2024-02-28 | End: 2024-02-28 | Stop reason: HOSPADM

## 2024-02-28 RX ORDER — BUPIVACAINE HYDROCHLORIDE 5 MG/ML
30 INJECTION, SOLUTION EPIDURAL; INTRACAUDAL ONCE
Status: CANCELLED | OUTPATIENT
Start: 2024-02-28 | End: 2024-02-28

## 2024-02-28 RX ORDER — GABAPENTIN 100 MG/1
100 CAPSULE ORAL 3 TIMES DAILY
Status: DISCONTINUED | OUTPATIENT
Start: 2024-02-28 | End: 2024-03-01 | Stop reason: HOSPADM

## 2024-02-28 RX ORDER — SODIUM BICARBONATE 42 MG/ML
1.5 INJECTION, SOLUTION INTRAVENOUS ONCE
Status: DISCONTINUED | OUTPATIENT
Start: 2024-02-28 | End: 2024-02-28 | Stop reason: HOSPADM

## 2024-02-28 RX ORDER — RIBOFLAVIN (VITAMIN B2) 400 MG
400 TABLET ORAL DAILY
Status: DISCONTINUED | OUTPATIENT
Start: 2024-02-28 | End: 2024-02-28 | Stop reason: CLARIF

## 2024-02-28 RX ADMIN — LOSARTAN POTASSIUM 50 MG: 50 TABLET, FILM COATED ORAL at 13:06

## 2024-02-28 RX ADMIN — VANCOMYCIN HYDROCHLORIDE 1000 MG: 10 INJECTION, POWDER, LYOPHILIZED, FOR SOLUTION INTRAVENOUS at 03:18

## 2024-02-28 RX ADMIN — SODIUM CHLORIDE, POTASSIUM CHLORIDE, SODIUM LACTATE AND CALCIUM CHLORIDE: 600; 310; 30; 20 INJECTION, SOLUTION INTRAVENOUS at 12:12

## 2024-02-28 RX ADMIN — MAGNESIUM SULFATE HEPTAHYDRATE 2000 MG: 40 INJECTION, SOLUTION INTRAVENOUS at 12:15

## 2024-02-28 RX ADMIN — CARBIDOPA AND LEVODOPA 2 TABLET: 25; 100 TABLET, EXTENDED RELEASE ORAL at 08:55

## 2024-02-28 RX ADMIN — SODIUM CHLORIDE, POTASSIUM CHLORIDE, SODIUM LACTATE AND CALCIUM CHLORIDE: 600; 310; 30; 20 INJECTION, SOLUTION INTRAVENOUS at 23:26

## 2024-02-28 RX ADMIN — DEXAMETHASONE 4 MG: 4 TABLET ORAL at 20:59

## 2024-02-28 RX ADMIN — Medication 15 ML: at 12:14

## 2024-02-28 RX ADMIN — DEXAMETHASONE 4 MG: 4 TABLET ORAL at 13:07

## 2024-02-28 RX ADMIN — ONDANSETRON 4 MG: 2 INJECTION INTRAMUSCULAR; INTRAVENOUS at 16:46

## 2024-02-28 RX ADMIN — ENOXAPARIN SODIUM 30 MG: 100 INJECTION SUBCUTANEOUS at 13:06

## 2024-02-28 RX ADMIN — GABAPENTIN 100 MG: 100 CAPSULE ORAL at 13:06

## 2024-02-28 RX ADMIN — SODIUM CHLORIDE, POTASSIUM CHLORIDE, SODIUM LACTATE AND CALCIUM CHLORIDE: 600; 310; 30; 20 INJECTION, SOLUTION INTRAVENOUS at 20:59

## 2024-02-28 RX ADMIN — ACETAMINOPHEN 650 MG: 325 TABLET ORAL at 14:00

## 2024-02-28 RX ADMIN — MAGNESIUM SULFATE HEPTAHYDRATE 2000 MG: 40 INJECTION, SOLUTION INTRAVENOUS at 06:15

## 2024-02-28 RX ADMIN — CEFEPIME 2000 MG: 2 INJECTION, POWDER, FOR SOLUTION INTRAVENOUS at 02:41

## 2024-02-28 RX ADMIN — SODIUM CHLORIDE, PRESERVATIVE FREE 10 ML: 5 INJECTION INTRAVENOUS at 12:13

## 2024-02-28 RX ADMIN — ONDANSETRON 4 MG: 2 INJECTION INTRAMUSCULAR; INTRAVENOUS at 06:06

## 2024-02-28 RX ADMIN — GABAPENTIN 100 MG: 100 CAPSULE ORAL at 20:59

## 2024-02-28 ASSESSMENT — PAIN SCALES - GENERAL
PAINLEVEL_OUTOF10: 3
PAINLEVEL_OUTOF10: 0

## 2024-02-28 ASSESSMENT — ENCOUNTER SYMPTOMS: SHORTNESS OF BREATH: 1

## 2024-02-28 ASSESSMENT — PAIN DESCRIPTION - DESCRIPTORS: DESCRIPTORS: ACHING

## 2024-02-28 ASSESSMENT — PAIN DESCRIPTION - LOCATION: LOCATION: HEAD

## 2024-02-28 NOTE — H&P
ICU/Internal Medicine Note    Patient Name: Nabor Valadez   Admit Date: 2/27/2024   Code:Prior  PCP: Edenilson Munroe DO   Attending: Rafael Jackman MD    Chief Complaint: Shortness of Breath       Subjective   HPI:   Nabor Valadez is a 68 y.o. 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 Pt does not use oxygen at rehab facility at baseline. She has not noticed any increased fatigue, sweating, fever, chills, N/V/D, dysuria, skin irritation or other signs of infection. She denies recent sick contacts.     Most recent admission 2/14/24 for failure to thrive and generalized weakness. PT/OT, dietician and monitor off abx at [facility].    ROS:  As per HPI    ED Course:  Oriented x 3  Pt with SpO2 88-90% on arrival, placed on 2L NC. GARCIA with limited motion artifact but no acute ischemic abnormalities noted.   CBC with WBC at 35.  Troponin 15 : 13  LA 2.7  Flu and COVID negative    CXR: Findings similar to previous multiple large pulmonary nodules consistent with metastatic disease.  Left diaphragm not visualized possible left basilar infiltrate.  CT Chest: Left lower lobe mass and extensive pulmonary metastases. Somewhat limited evaluation distal pulmonary arterial branches. No large emboli. Peripheral emboli particularly right lower lobe cannot be excluded entirely.  CT Head: Left occipital mass similar to prior MRI is some vasogenic edema. No acute intracranial hemorrhage.    Past Medical Hx:      Diagnosis Date    Age-related osteoporosis without current pathological fracture 03/20/2012    Asthma     Mild, seasonal    Bradycardia     Degenerative arthritis of left knee 11/01/2021    History of recurrent UTIs 05/04/2021    Lung cancer (HCC)     Parkinson disease (HCC) - diagnosed age 60 09/12/2016    Postmenopausal     Urinary

## 2024-02-28 NOTE — PROGRESS NOTES
Patient arrived on the floor. MD notified . Vitals were taken patient is currently in bed sleeping. Will continue to monitor.

## 2024-02-28 NOTE — PLAN OF CARE
Problem: Skin/Tissue Integrity  Goal: Absence of new skin breakdown  Description: 1.  Monitor for areas of redness and/or skin breakdown  2.  Assess vascular access sites hourly  3.  Every 4-6 hours minimum:  Change oxygen saturation probe site  4.  Every 4-6 hours:  If on nasal continuous positive airway pressure, respiratory therapy assess nares and determine need for appliance change or resting period.  2/28/2024 1840 by Abigail Graf, RN  Outcome: Progressing  2/28/2024 1710 by Jessica Cm, RN  Outcome: Progressing     Problem: Safety - Adult  Goal: Free from fall injury  Outcome: Progressing  Flowsheets (Taken 2/28/2024 1840)  Free From Fall Injury:   Instruct family/caregiver on patient safety   Based on caregiver fall risk screen, instruct family/caregiver to ask for assistance with transferring infant if caregiver noted to have fall risk factors

## 2024-02-28 NOTE — ED PROVIDER NOTES
THE [facility]  EMERGENCY DEPARTMENT ENCOUNTER          ATTENDING PHYSICIAN NOTE       Date of evaluation: 2/27/2024    Chief Complaint     Shortness of Breath      History of Present Illness     Nabor Valadez is a 68 y.o. female who presents to the emergency department with a complaint of shortness of breath.  The patient has a known history of lung cancer with metastasis to the brain is currently on 4 mg twice daily Decadron.  She reports that she has had increased shortness of breath over the course the past several weeks was seen in the emergency department for same symptoms several weeks ago and at that time had a chest x-ray which was unremarkable received bronchodilators also ultimately discharged.  She reports no new fevers noted nausea vomiting has shortness of breath is worsened with any level of exertion she denies any chest pain denies orthopnea or PND    ASSESSMENT / PLAN  (MEDICAL DECISION MAKING)     INITIAL VITALS: BP: (!) 143/82, Temp: 98 °F (36.7 °C), Pulse: 80, Respirations: 20, SpO2: 99 %      Nabor Valadez is a 68 y.o. female who presented to the Emergency Department with concerns for shortness of breath.  On assessment the patient had some mild tachypnea her room air oxygen saturations were 88 to 90% requiring her to be placed on 2 L of nasal cannula oxygen to maintain oxygen saturation greater than 92%.  The patient had coarse breath sounds throughout all lung fields without significant wheezing.  EKG had difficult interpretation secondary to her underlying Parkinson's, however showed no evidence of any significant ischemic changes.  Her venous blood gas showed no evidence of any significant acidosis her CBC shows a continuing rise in her white blood cell count of 35.1 potentially related to steroid use although infection would be a concern as well.  Her chest x-ray shows multifocal opacities consistent with her most recent x-rays as well.  At this point time the patient  nonerythematous  Neck: supple, no lymphadenopathy  Chest: Tachypnea, coarse breath sounds heard throughout all lung fields more prominent in the right as opposed to the left where there is diminished breath sounds in the left lower lung base  Cardiovascular: Regular, rate, and rhythm, 2+ radial pulses bilaterally, capillary refill 2 seconds  Abdominal: Soft, nontender, nondistended, no rebound or guarding  Skin: Warm, dry well perfused, no rashes  Musculoskeletal: no obvious deformities, no tenderness to palpation diffusely  Neurologic: Resting tremor, alert and oriented x 4 speech is clear and intact without dysarthria, no facial droop.                 Kevin Rouse MD  02/27/24 0352

## 2024-02-28 NOTE — PROGRESS NOTES
Pt being transported to her new room. All of the pt's belongings including her home medication was sent with her. Calli Moore RN

## 2024-02-28 NOTE — PROGRESS NOTES
Occupational Therapy  Facility/Department: THE [facility] EMERGENCY DEPARTMENT  Occupational Therapy Initial Assessment/Treatment    Name: Nabor Valadez  : 1955  MRN: [id number]  Date of Service: 2024    Discharge Recommendations:  IP Rehab  OT Equipment Recommendations  Equipment Needed: No  Other: Defer to next level of care       Patient Diagnosis(es): The encounter diagnosis was Acute respiratory failure with hypoxia (HCC).  Past Medical History:  has a past medical history of Age-related osteoporosis without current pathological fracture, Asthma, Bradycardia, Degenerative arthritis of left knee, History of recurrent UTIs, Lung cancer (HCC), Parkinson disease (HCC) - diagnosed age 60, Postmenopausal, and Urinary incontinence.  Past Surgical History:  has a past surgical history that includes laparoscopy; Tonsillectomy; Colonoscopy (2006); Colonoscopy (2015); Finger surgery (Bilateral); joint replacement (Left); and bronchoscopy (Left, 2024).    Treatment Diagnosis: Impaired ADL, functional activity tolerance, functional mobility      Assessment   Performance deficits / Impairments: Decreased functional mobility ;Decreased ADL status;Decreased endurance  Assessment: Pt presents with a decline in functional ndependence.  Pt was admitted from acute rehab. Pt c/o weakness.  Currently, pt req min assist for transfers and min-mod assist for ADL.  Will follow for OT and anticipate return to rehab.  Treatment Diagnosis: Impaired ADL, functional activity tolerance, functional mobility  Prognosis: Good  Decision Making: Medium Complexity  REQUIRES OT FOLLOW-UP: Yes  Activity Tolerance  Activity Tolerance: Patient limited by fatigue        Plan   Occupational Therapy Plan  Times Per Week: 2-5  Current Treatment Recommendations: Strengthening, Balance training, Functional mobility training, Endurance training, Self-Care / ADL     Restrictions  Position Activity Restriction  Other  position/activity restrictions: Up with assist    Subjective   General  Chart Reviewed: Yes  Patient assessed for rehabilitation services?: Yes  Additional Pertinent Hx: Pt admitted 2/27/24 with shortness of breath.  Pt has h/o lung cancer and Parkinson disease.     Pt at [facility] 2/14-2/20 and discharged to [facility] .     Head CT= Left occipital mass similar to prior MRI is some vasogenic edema. No acute intracranial hemorrhage.     CT chest PE= Left lower lobe mass and extensive pulmonary metastases.  Somewhat limited evaluation distal pulmonary arterial branches. No large emboli. Peripheral emboli particularly right lower lobe cannot be excluded entirely.  Family / Caregiver Present: No  Referring Practitioner: Dr. Carbone  Diagnosis: Hypoxia  Subjective  Subjective: Pt in bed upon entry.   Pt c/o weakness.   Pain: 0/10     Social/Functional History  Social/Functional History  Lives With: Spouse (4 kids 13-17yrs)  [facility]  Home Layout: Multi-level, Bed/Bath upstairs  Home Access: Stairs to enter with rails  Entrance Stairs - Number of Steps: 10  Bathroom Shower/Tub: Tub/Shower unit  Bathroom Toilet: Standard  Bathroom Equipment: Shower chair  Has the patient had two or more falls in the past year or any fall with injury in the past year?: No  Receives Help From: Family  ADL Assistance: Needs assistance  Homemaking Assistance: Needs assistance (shared responsibilities)  Ambulation Assistance: Needs assistance (RW)  Transfer Assistance: Needs assistance  Active : No  Leisure & Hobbies: Watch crime shows  Additional Comments: Pt admitted from [facility]       Objective      Safety Devices  Type of Devices: Left in bed;Call light within reach;Nurse notified;Gait belt  Restraints  Restraints Initially in Place: No  Balance  Sitting: Intact (at EOB)  Standing: With support (Bilat HHA with standing/ambulation balance)     AROM: Within functional

## 2024-02-28 NOTE — PROGRESS NOTES
02/28/24 0931   Encounter Summary   Encounter Overview/Reason  Initial Encounter   Service Provided For: Patient   Support System Spouse;Children   Last Encounter  02/28/24   Complexity of Encounter Moderate   Begin Time 0915   End Time  0933   Total Time Calculated 18 min   Assessment/Intervention/Outcome   Assessment   (Frustrated)   Intervention Active listening;Discussed belief system/Jew practices/el;Discussed illness injury and it’s impact   Outcome Expressed feelings, needs, and concerns;Expressed Gratitude   Plan and Referrals   Plan/Referrals Continue Support (comment)  (Pt reqiest CV-MSR-When admitted to floor.)      Note:   met with pt to offer spiritual and emotional support.  supported through active listening, pt shared frustration and feelings associated with CA. Pt request  to visit on floor,  will continue to offer support.   Rev. Claudia Wolff Mdiv., BCC

## 2024-02-28 NOTE — CONSULTS
Vancomycin has been discontinued. Pharmacy will sign off consult. If medication dosing is resumed, please re-consult pharmacy.    Madhavi MonsalveD, [facility] 2/28/2024 1:39 PM

## 2024-02-28 NOTE — PROGRESS NOTES
[facility] -  Clinical Pharmacy Note    Vancomycin - Management by Pharmacy    Consult Date(s): 02/28/24  Consulting Provider(s): Raf    Assessment / Plan  Sepsis of unknown etiology - Vancomycin  Concurrent Antimicrobials:   N/a  Day of Vanc Therapy / Ordered Duration: Day 1 of 7  Current Dosing Method: Bayesian-Guided AUC Dosing  Therapeutic Goal: -600 mg/L*hr  Current Dose / Plan:   Vancomycin 1000mg IV x 1 dose followed by 1000mg IV Q12H for 7 days  Estimated AUC of 537 mg/L*hr and Ctrough  of 12.5 mg/L  Will continue to monitor clinical condition and make adjustments to regimen as appropriate.    Thank you for consulting pharmacy,    Ran Partida, [facility] ext [phone]        Interval update:  New Start    Subjective/Objective:   Nabor Valadez is a 68 y.o. 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     Pharmacy is consulted to dose vancomycin.    Ht Readings from Last 1 Encounters:   02/27/24 1.6 m (5' 3\")     Wt Readings from Last 1 Encounters:   02/27/24 47.6 kg (105 lb)     Current & Prior Antimicrobial Regimen(s):  Cefepime 2000mg IV x 1 dose  Vancomycin 1000mg IV x 1 dose followed by 1000mg IV Q12H for 7 days    Vancomycin Level(s) / Doses:    Date Time Dose Type of Level / Level Interpretation                 Note: Serum levels collected for AUC-based dosing may be high if collected in close proximity to the dose administered. This is not necessarily indicative of toxicity.    Cultures & Sensitivities:    Date Site Micro Susceptibility / Result                 Recent Labs     02/27/24 2107   CREATININE <0.5*   BUN 20   WBC 35.1*       Estimated Creatinine Clearance: 82 mL/min (based on SCr of 0.5 mg/dL).    Additional Lab Values / Findings of Note:    No results for input(s): \"PROCAL\" in the

## 2024-02-28 NOTE — PLAN OF CARE
Problem: Skin/Tissue Integrity  Goal: Absence of new skin breakdown  Description: 1.  Monitor for areas of redness and/or skin breakdown  2.  Assess vascular access sites hourly  3.  Every 4-6 hours minimum:  Change oxygen saturation probe site  4.  Every 4-6 hours:  If on nasal continuous positive airway pressure, respiratory therapy assess nares and determine need for appliance change or resting period.  Outcome: Progressing   No new skin issues

## 2024-02-28 NOTE — RT PROTOCOL NOTE
RT Nebulizer Bronchodilator Protocol Note    There is a bronchodilator order in the chart from a provider indicating to follow the RT Bronchodilator Protocol and there is an “Initiate RT Bronchodilator Protocol” order as well (see protocol at bottom of note).    CXR Findings:  XR CHEST PORTABLE    Result Date: 2/27/2024  Findings similar to previous multiple large pulmonary nodules consistent with metastatic disease. Left diaphragm not visualized possible left basilar infiltrate. Electronically signed by Maggie Pearson      The findings from the last RT Protocol Assessment were as follows:  Smoking: None or smoker <15 pack years  Respiratory Pattern: Dyspnea on exertion or RR 21-25 bpm  Breath Sounds: Slightly diminished and/or crackles  Cough: Strong, productive  Indication for Bronchodilator Therapy: Decreased or absent breath sounds  Bronchodilator Assessment Score: 5    Aerosolized bronchodilator medication orders have been revised according to the RT Nebulizer Bronchodilator Protocol below.    Respiratory Therapist to perform RT Therapy Protocol Assessment initially then follow the protocol.  Repeat RT Therapy Protocol Assessment PRN for score 0-3 or on second treatment, BID, and PRN for scores above 3.    No Indications - adjust the frequency to every 6 hours PRN wheezing or bronchospasm, if no treatments needed after 48 hours then discontinue using Per Protocol order mode.     If indication present, adjust the RT bronchodilator orders based on the Bronchodilator Assessment Score as indicated below.  If a patient is on this medication at home then do not decrease Frequency below that used at home.    0-3 - enter or revise RT bronchodilator order(s) to equivalent RT Bronchodilator order with Frequency of every 4 hours PRN for wheezing or increased work of breathing using Per Protocol order mode.       4-6 - enter or revise RT Bronchodilator order(s) to two equivalent RT bronchodilator orders with one order  with BID Frequency and one order with Frequency of every 4 hours PRN wheezing or increased work of breathing using Per Protocol order mode.         7-10 - enter or revise RT Bronchodilator order(s) to two equivalent RT bronchodilator orders with one order with TID Frequency and one order with Frequency of every 4 hours PRN wheezing or increased work of breathing using Per Protocol order mode.       11-13 - enter or revise RT Bronchodilator order(s) to one equivalent RT bronchodilator order with QID Frequency and an Albuterol order with Frequency of every 4 hours PRN wheezing or increased work of breathing using Per Protocol order mode.      Greater than 13 - enter or revise RT Bronchodilator order(s) to one equivalent RT bronchodilator order with every 4 hours Frequency and an Albuterol order with Frequency of every 2 hours PRN wheezing or increased work of breathing using Per Protocol order mode.     RT to enter RT Home Evaluation for COPD & MDI Assessment order using Per Protocol order mode.    The patient does not take medication on schedule at home. The patient is not wheezing or short of breathe at the moment. The patient expressed they are okay with PRN treatments.    Electronically signed by Lora Machuca RCP on 2/28/2024 at 9:03 AM

## 2024-02-28 NOTE — CONSULTS
Mild intermittent asthma without complication    Age-related osteoporosis without current pathological fracture    Parkinson disease (HCC) - diagnosed age 60    Seasonal allergies    History of recurrent UTIs    Family history of colon cancer in mother    Bradycardia    Abnormal EKG    S/P total knee replacement, left    Acute cough    Dyspnea    Primary cancer of left lower lobe of lung (HCC)    Mediastinal lymphadenopathy    Malignant neoplasm metastatic to both lungs (HCC)    Failure to thrive in adult    Severe malnutrition (HCC)    Metastasis to brain (HCC)    Hypoxia       IMPRESSION/RECOMMENDATIONS:    Nabor Valadez is a 68-year-old female with new diagnosis metastatic squamous cell carcinoma.  She was seen approximately 2 weeks ago for failure to thrive and scheduled gamma knife radiation had to be postponed at that time for her to go to rehab to help with conditioning.    Unfortunately while at rehab, she developed increased dyspnea and cough and returned to the ER for further evaluation.  She is now oxygen dependent and more somnolent.    CT of the head did not reveal any evidence of bleed or disease progression.  CT of the chest continues to show left lower lobe lung mass and collapse and diffuse pulmonary metastasis.    Discussed with the patient that her more worrisome disease process at this time is her lung disease and respiratory status.    Unfortunately, because she has diffuse metastatic disease, there is not one good target for radiation that would improve her respiratory status.  This was discussed with the patient.    We are going to put planned gamma knife radiation on hold at this time unless she makes a significant improvement in clinical status.    Per SHANI Shell, she is not likely to be able to tolerate systemic therapy at this time and would be hospice appropriate.      Thank you for allowing us to participate in this patient's care.     Please note this document has been  produced using speech recognition software and may contain errors related to that system including errors in grammar, punctuation, and spelling, as well as words and phrases that may be inappropriate. If there are any questions or concerns please feel free to contact me for clarification.    A total of 75 minutes was spent on today's patient encounter.  If applicable, non-patient-facing activities:     (  x )   Preparing to see the patient and reviewing records     (  x )   Individual interpretation of results      (  x )   Discussion or coordination of care with other health care professionals     (   )   Ordering of unique tests, medications, or procedures     (  x )   Documentation within the EHR       DONNA Douglas Dr. was consulted for this visit.  Please Contact Through [employer]

## 2024-02-28 NOTE — PROGRESS NOTES
Report was called to the oncoming RN on [address] (Abigail) all questions answered. The pt was updated that she now has a room, we are now working on getting her transported to room 5499. Calli Moore RN

## 2024-02-28 NOTE — PROGRESS NOTES
Progress Note    Admit Date: 2/27/2024  Day: 2  Diet: ADULT DIET; Regular; 4 carb choices (60 gm/meal)  ADULT ORAL NUTRITION SUPPLEMENT; Breakfast, Lunch; Standard High Calorie/High Protein Oral Supplement  ADULT ORAL NUTRITION SUPPLEMENT; Breakfast, Lunch, Dinner; Diabetic Oral Supplement    CC:  Shortness of Breath     Interval history:   Pt. seen this morning in room. No acute events overnight.    HPI:   Nabor Valadez is a 68 y.o. 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 Pt does not use oxygen at rehab facility at baseline. She has not noticed any increased fatigue, sweating, fever, chills, N/V/D, dysuria, skin irritation or other signs of infection. She denies recent sick contacts.      Patient was diagnosed with metastatic lung cancer at [facility] 01/2024.  Most recent admission 2/14/24 for failure to thrive and generalized weakness. Was scheduled to have Gamma knife surgery on 02/20/24 but surgery did not go through. Appears that patient required more rehab and was not deemed a good candidate for surgery.    Patient was admitted for further workup and management for new increased O2 requirement.    Medications:     Scheduled Meds:   albuterol sulfate HFA  2 puff Inhalation 4x Daily RT    ascorbic acid  500 mg Oral Daily    oyster shell calcium w/D  2 tablet Oral Daily    dexAMETHasone  4 mg Oral 2 times per day    fluticasone  1 spray Each Nostril Daily    gabapentin  100 mg Oral TID    cetirizine  10 mg Oral Daily    losartan  50 mg Oral Daily    CENTRUM/CERTA-OMER with minerals oral  15 mL Oral Daily    pantoprazole  40 mg Oral QAM AC    sodium chloride flush  5-40 mL IntraVENous 2 times per day    enoxaparin  30 mg SubCUTAneous Daily    vancomycin  1,000 mg IntraVENous Q12H    ipratropium 0.5 mg-albuterol 2.5 mg  1 Dose Inhalation Q4H WA RT     hours.  Troponin: No results for input(s): \"TROPONINI\" in the last 72 hours.  BNP: No results for input(s): \"BNP\" in the last 72 hours.  Lipids: No results for input(s): \"CHOL\", \"HDL\" in the last 72 hours.    Invalid input(s): \"LDLCALCU\", \"TRIGLYCERIDE\"  ABGs:  No results for input(s): \"PHART\", \"FDQ4DOB\", \"PO2ART\", \"OLN1PUF\", \"BEART\", \"THGBART\", \"J3FHMVTX\", \"IWB8RBW\" in the last 72 hours.    INR: No results for input(s): \"INR\" in the last 72 hours.  Lactate: No results for input(s): \"LACTATE\" in the last 72 hours.  Cultures:  -----------------------------------------------------------------  RAD:   CT CHEST PULMONARY EMBOLISM W CONTRAST   Final Result      Left lower lobe mass and extensive pulmonary metastases.   Somewhat limited evaluation distal pulmonary arterial branches. No large emboli. Peripheral emboli particularly right lower lobe cannot be excluded entirely.      Electronically signed by Maggie Pearson      CT HEAD WO CONTRAST   Final Result   Left occipital mass similar to prior MRI is some vasogenic edema.   No acute intracranial hemorrhage.      Electronically signed by Maggie Pearson      XR CHEST PORTABLE   Final Result   Findings similar to previous multiple large pulmonary nodules consistent with metastatic disease.   Left diaphragm not visualized possible left basilar infiltrate.      Electronically signed by Maggie Pearson          Assessment/Plan:   Nabor Valadez is a 68 y.o. 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     Increasing O2 Requirement  PNA vs increasing burden from metastatic lung CA  SpO2 88-90% on arrival, 2L NC. WBC at 35, WBC count past month has been elevated around 20k. Patient is on decadron for vasogenic edema of the brain 2/2 brain mets from lung cancer.  CXR: Findings similar to previous multiple

## 2024-02-28 NOTE — ONCOLOGY
[facility] RADIATION ONCOLOGY    [phone]    DATE:2/28/24    AREA TREATED: SPINE C6-T3 AND T11-L1    DAILY DOSE:300 cGy BOTH SITES    CUMULATIVE DOSE: 300 cGy BOTH SITES    # 1  OUT OF  10 TREATMENTS    NEXT PLANNED TREATMENT  DATE: THURSDAY 2/29    Daily Treatments are Monday through Friday      INPATIENT CODING:  Beam Radiation   Photons  BOTH SITES  Photon energy : 15 MV BOTH SITES

## 2024-02-28 NOTE — ED PROVIDER NOTES
THE [facility]  EMERGENCY DEPARTMENT ENCOUNTER          ATTENDING PHYSICIAN NOTE       Date of evaluation: 2/27/2024    ADDENDUM:      Care of this patient was assumed from Dr. Candido Rouse.  The patient was seen for Shortness of Breath  .  The patient's initial evaluation and plan have been discussed with the prior provider who initially evaluated the patient.  Nursing Notes, Past Medical Hx, Past Surgical Hx, Social Hx, Allergies, and Family Hx were all reviewed.  Patient is a 68-year-old female with known history of lung cancer with metastatic disease to the brain currently on 4 mg of Decadron twice daily who presents complaining of increased shortness of breath.  She was seen in the emergency room several weeks ago for similar complaints and had an unremarkable chest x-ray and received bronchodilators improvement of symptoms.  She denies any chest pain or productive cough with this.  She denies any fevers or chills.  On arrival, patient was noted to have oxygen saturations of 88 to 90% on room air necessitating being placed on 2 L nasal cannula oxygen.  EKG was limited by motion artifact but showed no acute ischemic abnormalities.  CBC does show worsening white blood cell count to 35 when it was 20 on her most recent hospitalization.  VBG is unremarkable.  At time of turnover, completion of laboratory studies as well as imaging was pending.    ASSESSMENT / PLAN  (MEDICAL DECISION MAKING)     Nabor Valadez is a 68 y.o. female with history of lung cancer metastatic to the brain dexamethasone twice daily presents for increasing shortness of breath.  Patient is currently in rehab after recent admission for generalized weakness and states she has been having increased shortness of breath since discharge.  She states it did not become significantly worse today but has been gradually building up.  She does note occasional cough is nonproductive of sputum.  On arrival, patient was noted be hypoxic with oxygen  - 10.6 mg/dL   Magnesium   Result Value Ref Range    Magnesium 1.70 (L) 1.80 - 2.40 mg/dL   Troponin   Result Value Ref Range    Troponin, High Sensitivity 15 (H) 0 - 14 ng/L   Brain Natriuretic Peptide   Result Value Ref Range    Pro- (H) 0 - 124 pg/mL   Blood Gas, Venous   Result Value Ref Range    pH, Joss 7.486 (H) 7.350 - 7.450    pCO2, Joss 34.5 (L) 41.0 - 51.0 mmHg    pO2, Joss 52.3 (H) 25.0 - 40.0 mmHg    HCO3, Venous 26.0 24.0 - 28.0 mmol/L    Base Excess, Joss 3.0 -2.0 - 3.0 mmol/L    O2 Sat, Joss 86 Not established %    Carboxyhemoglobin 1.3 0.0 - 1.5 %    MetHgb, Joss <0.0 0.0 - 1.5 %    TC02 (Calc), Joss 27 mmol/L    Hemoglobin, Joss, Reduced 13.50 %   Lactic Acid   Result Value Ref Range    Lactic Acid 2.7 (H) 0.4 - 2.0 mmol/L   Troponin   Result Value Ref Range    Troponin, High Sensitivity 13 0 - 14 ng/L     EKG   EKG shows normal sinus rhythm with no obvious ischemic abnormalities though limited by motion artifact.    MOST RECENT VITALS:  BP: (!) 148/88, Temp: 98 °F (36.7 °C), Pulse: 71, Respirations: 19, SpO2: 97 %     Procedures     N/A    ED Course          The patient was given the following medications:  Orders Placed This Encounter   Medications    iopamidol (ISOVUE-370) 76 % injection 75 mL       CONSULTS:  None       Placido Ennis MD  02/28/24 0026

## 2024-02-28 NOTE — CONSULTS
[facility]    Consult Note      Requesting Physician:  Dennis Chao     CHIEF COMPLAINT:  Hypoxia       HISTORY OF PRESENT ILLNESS:    Ms. Minor Valadez  is a 68 y.o. female we are seeing in consultation for Metastatic Lung Cancer. Unfortunately, due to hospitalizations, she has not yet been started on treatment. Was due to have Gamma Knife on 2/20 however this was rescheduled due to rehab admission. 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 She has not yet received this medication.     She was admitted 2/14/24 with weakness and failure to thrive. Goals of care conversations were had at that time. Ultimately, she wanted to pursue treatment but needed rehab prior. She presents from rehab with increased SOB and cough. She tells me that rehab was \"hard\" but she felt she had made some small progress.      She appears very weak and ill today. A marked change from when she discharged previously. She is having difficulty staying awake to have conversation. She is asking when she can have her Gamma Knife. Again endorses desire to pursue treatment but also states \" tell me what you want me to do.\"       Past Medical History:  Past Medical History:   Diagnosis Date    Age-related osteoporosis without current pathological fracture 03/20/2012    Asthma     Mild, seasonal    Bradycardia     Degenerative arthritis of left knee 11/01/2021    History of recurrent UTIs 05/04/2021    Lung cancer (HCC)     Parkinson disease (HCC) - diagnosed age 60 09/12/2016    Postmenopausal     Urinary incontinence 2016    Bladder leaks coughing laughing etc       Past Surgical History:  Past Surgical History:   Procedure Laterality Date    BRONCHOSCOPY Left 1/24/2024    BRONCHOSCOPY WITH ENDOBRONCHIAL ULTRASOUND MASS OF LOWER LOBE  count multiple pulmonary parenchymal nodules are seen throughout both lungs consistent with metastatic disease correlate clinically findings unchanged from previous exam.  There is cardiomegaly without vascular congestion.  There may be more focal consolidation at the left lung base either a mass or infiltrate. The left diaphragm is not visualized.  Senescent changes dorsal spine and cardiovascular structures  Impression: Findings similar to previous multiple large pulmonary nodules consistent with metastatic disease.  Left diaphragm not visualized possible left basilar infiltrate.    Electronically signed by Maggie Pearson      Problem List  Patient Active Problem List   Diagnosis    Mild intermittent asthma without complication    Age-related osteoporosis without current pathological fracture    Parkinson disease (HCC) - diagnosed age 60    Seasonal allergies    History of recurrent UTIs    Family history of colon cancer in mother    Bradycardia    Abnormal EKG    S/P total knee replacement, left    Acute cough    Dyspnea    Primary cancer of left lower lobe of lung (HCC)    Mediastinal lymphadenopathy    Malignant neoplasm metastatic to both lungs (HCC)    Failure to thrive in adult    Severe malnutrition (HCC)    Metastasis to brain (HCC)    Hypoxia       IMPRESSION/RECOMMENDATIONS:    Squamous Cell Lung Cancer; T3N2M1  - PET/CT demonstrates the left lower lobe primary tumor with multiple bilateral pulmonary metastases, small bilateral pleural effusions, left adrenal metastasis, bone metastasis involving the pubic bone and the left 11th rib, lymph node metastasis in the left neck, mediastinum, left hilum, right thoracic inlet.  - Left lower lobe mass, causing left lower lobe bronchial obstruction  - Bronchoscopy with EBUS and biopsy showed squamous cell carcinoma  - Caris molecular profiling revealed ROS-1 fusion. POT placed for Entrectinib which we instructed to her begin in the rehab however she has not yet

## 2024-02-28 NOTE — PROGRESS NOTES
Physical Therapy  Facility/Department: THE [facility] EMERGENCY DEPARTMENT  Physical Therapy Initial Assessment/Treatment    Name: Nabor Valadez  : 1955  MRN: [id number]  Date of Service: 2024    Discharge Recommendations:  IP Rehab   PT Equipment Recommendations  Other: defer      Patient Diagnosis(es): The encounter diagnosis was Acute respiratory failure with hypoxia (HCC).  Past Medical History:  has a past medical history of Age-related osteoporosis without current pathological fracture, Asthma, Bradycardia, Degenerative arthritis of left knee, History of recurrent UTIs, Lung cancer (HCC), Parkinson disease (HCC) - diagnosed age 60, Postmenopausal, and Urinary incontinence.  Past Surgical History:  has a past surgical history that includes laparoscopy; Tonsillectomy; Colonoscopy (2006); Colonoscopy (2015); Finger surgery (Bilateral); joint replacement (Left); and bronchoscopy (Left, 2024).    Assessment   Body Structures, Functions, Activity Limitations Requiring Skilled Therapeutic Intervention: Decreased functional mobility ;Decreased endurance  Assessment: Pt is a 67 yo female with dx of hypoxia. Pt presents from [facility] where she was receiving therapy services. Prior to rehab, pt was living home with  and children and required some assistance with ADLs, but was mostly IND with transfers and ambulation using a RW. Pt currently requires mostly SBA for bed mobility, transfers, and bilat HHA for ambulation up to 3ft. Pt demo'd mostly steady standing balance but is limited by decreased endurance with activity. PT will continue to follow pt throughout hospital admission. PT to recommend dc back to rehab facility to improve functional independence and endurance with mobility.  Treatment Diagnosis: decreased endurance with mobility  Therapy Prognosis: Fair  Decision Making: Medium Complexity  Requires PT Follow-Up: Yes  Activity Tolerance  Activity

## 2024-02-29 LAB
ANION GAP SERPL CALCULATED.3IONS-SCNC: 11 MMOL/L (ref 3–16)
BASOPHILS # BLD: 0 K/UL (ref 0–0.2)
BASOPHILS NFR BLD: 0 %
BUN SERPL-MCNC: 17 MG/DL (ref 7–20)
CALCIUM SERPL-MCNC: 8.5 MG/DL (ref 8.3–10.6)
CHLORIDE SERPL-SCNC: 99 MMOL/L (ref 99–110)
CO2 SERPL-SCNC: 22 MMOL/L (ref 21–32)
CREAT SERPL-MCNC: <0.5 MG/DL (ref 0.6–1.2)
DEPRECATED RDW RBC AUTO: 17.7 % (ref 12.4–15.4)
EOSINOPHIL # BLD: 0 K/UL (ref 0–0.6)
EOSINOPHIL NFR BLD: 0 %
GFR SERPLBLD CREATININE-BSD FMLA CKD-EPI: >60 ML/MIN/{1.73_M2}
GLUCOSE SERPL-MCNC: 118 MG/DL (ref 70–99)
HCT VFR BLD AUTO: 35.7 % (ref 36–48)
HGB BLD-MCNC: 11.3 G/DL (ref 12–16)
LYMPHOCYTES # BLD: 0.7 K/UL (ref 1–5.1)
LYMPHOCYTES NFR BLD: 2.1 %
MAGNESIUM SERPL-MCNC: 2 MG/DL (ref 1.8–2.4)
MCH RBC QN AUTO: 28.9 PG (ref 26–34)
MCHC RBC AUTO-ENTMCNC: 31.7 G/DL (ref 31–36)
MCV RBC AUTO: 91.1 FL (ref 80–100)
MONOCYTES # BLD: 1.4 K/UL (ref 0–1.3)
MONOCYTES NFR BLD: 4.1 %
MRSA DNA SPEC QL NAA+PROBE: NORMAL
NEUTROPHILS # BLD: 32.6 K/UL (ref 1.7–7.7)
NEUTROPHILS NFR BLD: 93.8 %
PLATELET # BLD AUTO: 211 K/UL (ref 135–450)
PMV BLD AUTO: 7 FL (ref 5–10.5)
POTASSIUM SERPL-SCNC: 4.7 MMOL/L (ref 3.5–5.1)
RBC # BLD AUTO: 3.91 M/UL (ref 4–5.2)
SODIUM SERPL-SCNC: 132 MMOL/L (ref 136–145)
WBC # BLD AUTO: 34.8 K/UL (ref 4–11)

## 2024-02-29 PROCEDURE — 6360000002 HC RX W HCPCS: Performed by: NURSE PRACTITIONER

## 2024-02-29 PROCEDURE — 80048 BASIC METABOLIC PNL TOTAL CA: CPT

## 2024-02-29 PROCEDURE — 85025 COMPLETE CBC W/AUTO DIFF WBC: CPT

## 2024-02-29 PROCEDURE — 99223 1ST HOSP IP/OBS HIGH 75: CPT | Performed by: NURSE PRACTITIONER

## 2024-02-29 PROCEDURE — 83735 ASSAY OF MAGNESIUM: CPT

## 2024-02-29 PROCEDURE — 1200000000 HC SEMI PRIVATE

## 2024-02-29 PROCEDURE — 2580000003 HC RX 258: Performed by: ANESTHESIOLOGY

## 2024-02-29 PROCEDURE — 6370000000 HC RX 637 (ALT 250 FOR IP)

## 2024-02-29 PROCEDURE — 2580000003 HC RX 258

## 2024-02-29 PROCEDURE — 6360000002 HC RX W HCPCS

## 2024-02-29 PROCEDURE — 36415 COLL VENOUS BLD VENIPUNCTURE: CPT

## 2024-02-29 RX ORDER — MORPHINE SULFATE 2 MG/ML
0.5 INJECTION, SOLUTION INTRAMUSCULAR; INTRAVENOUS EVERY 4 HOURS PRN
Status: DISCONTINUED | OUTPATIENT
Start: 2024-02-29 | End: 2024-03-01 | Stop reason: HOSPADM

## 2024-02-29 RX ORDER — MORPHINE SULFATE 2 MG/ML
0.5 INJECTION, SOLUTION INTRAMUSCULAR; INTRAVENOUS EVERY 4 HOURS PRN
Qty: 4.5 ML | Refills: 0 | Status: SHIPPED | OUTPATIENT
Start: 2024-02-29 | End: 2024-03-03

## 2024-02-29 RX ORDER — LORAZEPAM 2 MG/ML
1 INJECTION INTRAMUSCULAR EVERY 6 HOURS PRN
Qty: 6 ML | Refills: 0 | Status: SHIPPED | OUTPATIENT
Start: 2024-02-29 | End: 2024-03-03

## 2024-02-29 RX ADMIN — SODIUM CHLORIDE, PRESERVATIVE FREE 10 ML: 5 INJECTION INTRAVENOUS at 08:32

## 2024-02-29 RX ADMIN — GABAPENTIN 100 MG: 100 CAPSULE ORAL at 19:54

## 2024-02-29 RX ADMIN — GABAPENTIN 100 MG: 100 CAPSULE ORAL at 14:38

## 2024-02-29 RX ADMIN — LOSARTAN POTASSIUM 50 MG: 50 TABLET, FILM COATED ORAL at 08:31

## 2024-02-29 RX ADMIN — ACETAMINOPHEN 650 MG: 325 TABLET ORAL at 19:53

## 2024-02-29 RX ADMIN — DEXAMETHASONE 4 MG: 4 TABLET ORAL at 19:54

## 2024-02-29 RX ADMIN — PANTOPRAZOLE SODIUM 40 MG: 40 TABLET, DELAYED RELEASE ORAL at 06:05

## 2024-02-29 RX ADMIN — SODIUM CHLORIDE, POTASSIUM CHLORIDE, SODIUM LACTATE AND CALCIUM CHLORIDE: 600; 310; 30; 20 INJECTION, SOLUTION INTRAVENOUS at 08:35

## 2024-02-29 RX ADMIN — DEXAMETHASONE 4 MG: 4 TABLET ORAL at 08:31

## 2024-02-29 RX ADMIN — MORPHINE SULFATE 0.5 MG: 2 INJECTION, SOLUTION INTRAMUSCULAR; INTRAVENOUS at 14:38

## 2024-02-29 RX ADMIN — ONDANSETRON 4 MG: 2 INJECTION INTRAMUSCULAR; INTRAVENOUS at 04:33

## 2024-02-29 RX ADMIN — SODIUM CHLORIDE, POTASSIUM CHLORIDE, SODIUM LACTATE AND CALCIUM CHLORIDE: 600; 310; 30; 20 INJECTION, SOLUTION INTRAVENOUS at 17:27

## 2024-02-29 RX ADMIN — ENOXAPARIN SODIUM 30 MG: 100 INJECTION SUBCUTANEOUS at 08:30

## 2024-02-29 RX ADMIN — GABAPENTIN 100 MG: 100 CAPSULE ORAL at 08:31

## 2024-02-29 ASSESSMENT — PAIN SCALES - GENERAL
PAINLEVEL_OUTOF10: 0
PAINLEVEL_OUTOF10: 3

## 2024-02-29 ASSESSMENT — PAIN DESCRIPTION - ORIENTATION: ORIENTATION: MID

## 2024-02-29 ASSESSMENT — PAIN DESCRIPTION - LOCATION: LOCATION: HEAD

## 2024-02-29 ASSESSMENT — PAIN DESCRIPTION - FREQUENCY: FREQUENCY: INTERMITTENT

## 2024-02-29 ASSESSMENT — PAIN DESCRIPTION - ONSET: ONSET: GRADUAL

## 2024-02-29 ASSESSMENT — PAIN - FUNCTIONAL ASSESSMENT: PAIN_FUNCTIONAL_ASSESSMENT: ACTIVITIES ARE NOT PREVENTED

## 2024-02-29 ASSESSMENT — PAIN DESCRIPTION - PAIN TYPE: TYPE: ACUTE PAIN

## 2024-02-29 NOTE — PLAN OF CARE
Problem: Skin/Tissue Integrity  Goal: Absence of new skin breakdown  Description: 1.  Monitor for areas of redness and/or skin breakdown  2.  Assess vascular access sites hourly  3.  Every 4-6 hours minimum:  Change oxygen saturation probe site  4.  Every 4-6 hours:  If on nasal continuous positive airway pressure, respiratory therapy assess nares and determine need for appliance change or resting period.  Outcome: Progressing     Problem: Safety - Adult  Goal: Free from fall injury  Outcome: Progressing     Problem: Discharge Planning  Goal: Discharge to home or other facility with appropriate resources  Outcome: Progressing

## 2024-02-29 NOTE — PLAN OF CARE
Problem: Skin/Tissue Integrity  Goal: Absence of new skin breakdown  Description: 1.  Monitor for areas of redness and/or skin breakdown  2.  Assess vascular access sites hourly  3.  Every 4-6 hours minimum:  Change oxygen saturation probe site  4.  Every 4-6 hours:  If on nasal continuous positive airway pressure, respiratory therapy assess nares and determine need for appliance change or resting period.  2/28/2024 2343 by Nba Samaniego, RN  Outcome: Progressing  Note: Patient noted to have very fragile skin. Assist to change position every 2 hours and keep patient back dry and clean     Problem: Safety - Adult  Goal: Free from fall injury  2/28/2024 2343 by Nba Samaniego, RN  Outcome: Progressing  Flowsheets (Taken 2/28/2024 2343)  Free From Fall Injury:   Instruct family/caregiver on patient safety   Based on caregiver fall risk screen, instruct family/caregiver to ask for assistance with transferring infant if caregiver noted to have fall risk factors  Note: Discuss with patient safety measures and ensure all are in place. Bed lock in lowest position, bed alarm on, non skid socks worn by patient, bedside table with patients belongings and call light with in patient reach      Problem: Discharge Planning  Goal: Discharge to home or other facility with appropriate resources  Outcome: Progressing

## 2024-02-29 NOTE — CONSULTS
[facility]  Palliative Medicine Consultation Note      Date Of Admission:2/27/2024  Date of consult: 02/29/24  Seen by PC in the past:  Yes    Recommendations:        Pt is known to the palliative care team. Met with pt at the bedside along with Dr. Barney. Discussed her understanding of her condition. She reports that she had slight improvement in conditioning at [facility] but recently has been extremely short of breath. She reported that she did remember her conversation with Collette with Oncology yesterday and she agrees that she would not be able to withstand treatment at this point. I introduced the idea of hospice and she was in agreement. She requested a referral to [facility] hospice and is interested in their IPU due to her shortness of breath. I offered to call her  to discuss with him and she asked me not to call until after he is off work because he is a teacher and she did not want to upset him at work. She will tell her  to take off work tomorrow and come to the hospital so we can discuss together. She is OK with meeting with [facility] in the mean time. I also discussed changing her code status to DNRCC and starting low dose of morphine for air hunger. She was in agreement.    D/w Dr. Morgan, Collette, APRN and pt's RN. Left VM for CM YVONNE Ramirez about hospice referral.     1. Goals of Care/Advanced Care planning/Code status: DNRCC, changed today upon discussion with pt. Goals are comfort directed. She understands that she is not a good candidate for treatment and would prefer to focus on her symptoms. She requested a referral to hospice.   2. Pain: pt denied  3. SOB: pt endorsed shortness of breath and appeared with increased WOB, accessory muscle use. Likely related to her cancer - started morphine 0.5mg IV prn for air hunger and d/w RN.   4. Disposition: Possible [facility] hospice IPU, vs home with hospice. Discussed both options with the pt today.     Reason for Consult:         [x]  Goals    PHUR 7.0   WBCUA 0-2   RBCUA 3-4   CLARITYU Clear   SPECGRAV 1.015   LEUKOCYTESUR Negative   UROBILINOGEN 0.2   BILIRUBINUR Negative   BLOODU TRACE-INTACT*   GLUCOSEU Negative       CT CHEST PULMONARY EMBOLISM W CONTRAST   Final Result      Left lower lobe mass and extensive pulmonary metastases.   Somewhat limited evaluation distal pulmonary arterial branches. No large emboli. Peripheral emboli particularly right lower lobe cannot be excluded entirely.      Electronically signed by Maggie Pearson      CT HEAD WO CONTRAST   Final Result   Left occipital mass similar to prior MRI is some vasogenic edema.   No acute intracranial hemorrhage.      Electronically signed by Maggie Pearson      XR CHEST PORTABLE   Final Result   Findings similar to previous multiple large pulmonary nodules consistent with metastatic disease.   Left diaphragm not visualized possible left basilar infiltrate.      Electronically signed by Maggie Pearson            Conclusion/Time spent:         Recommendations see above    Time spent with patient and/or family: 40  Time reviewing records: 10 min   Time communicating with staff: 10 min     A total of 60 minutes spent with the patient and family on unit greater than 50% in counseling regarding palliative care and in goals of care for the patient.    Thank you to Dr. Jackman for this consultation. We will continue to follow Ms. Minor Valadez's care as needed.      Cathy Whittaker APRN  Inpatient Palliative Care  [phone]

## 2024-02-29 NOTE — PROGRESS NOTES
4 Eyes Admission Assessment     I agree as the admission nurse that 2 RN's have performed a thorough Head to Toe Skin Assessment on the patient. ALL assessment sites listed below have been assessed on admission.       Areas assessed by both nurses:   [x]   Head, Face, and Ears   [x]   Shoulders, Back, and Chest  [x]   Arms, Elbows, and Hands   [x]   Coccyx, Sacrum, and Ischum  [x]   Legs, Feet, and Heels        Does the Patient have Skin Breakdown?  Yes a wound was noted on the Admission Assessment and an LDA was Initiated documentation include the Piper-wound, Wound Assessment, Measurements, Dressing Treatment, Drainage, and Color\",         Nghia Prevention initiated:  Yes   Wound Care Orders initiated:  Yes      WOC nurse consulted for Pressure Injury (Stage 3,4, Unstageable, DTI, NWPT, and Complex wounds):  No    Patient has stage 2 on her coccyx covered with a mepilex. The skin has begun to break down.    Nurse 1 eSignature: Electronically signed by Abigail Graf RN on 2/28/24 at 7:07 PM EST    **SHARE this note so that the co-signing nurse is able to place an eSignature**    Nurse 2 eSignature: Electronically signed by Jessica Cm RN on 2/28/24 at 7:24 PM EST

## 2024-02-29 NOTE — DISCHARGE SUMMARY
INTERNAL MEDICINE DEPARTMENT AT THE [facility]  DISCHARGE SUMMARY    Patient ID: Nabor aVladez                                             Discharge Date: 2/29/2024   Patient's PCP: Edenilson Munroe DO                                          Discharge Physician: Mary Wasserman MD  Admit Date: 2/27/2024   Admitting Physician: Rafael Jackman MD    PROBLEMS DURING HOSPITALIZATION:  Patient Active Problem List   Diagnosis    Mild intermittent asthma without complication    Age-related osteoporosis without current pathological fracture    Parkinson disease (HCC) - diagnosed age 60    Seasonal allergies    History of recurrent UTIs    Family history of colon cancer in mother    Bradycardia    Abnormal EKG    S/P total knee replacement, left    Acute cough    Dyspnea    Primary cancer of left lower lobe of lung (HCC)    Mediastinal lymphadenopathy    Malignant neoplasm metastatic to both lungs (HCC)    Failure to thrive in adult    Severe malnutrition (HCC)    Metastasis to brain (HCC)    Hypoxia       DISCHARGE DIAGNOSES:  1- Squamous Cell Carcinoma of Lung with Metastasis  - OHC consulted  - Radiation Oncology Consulted  - Palliative consulted  - Discharge with hospice, [facility] IP or home    Hospital Course:  Patient reports SOB and requiring increased O2 supplementation from baseline with no home O2. Infection cause unlikely and empiric cefepime and vancomycin stopped. OHC and Radiation oncology consulted and discussed with patient that she would be a poor candidate for systemic treatment due to lack of stength and poor nutritional status, and too many metastasis for radiation treatment. Palliative consulted and patient has decided to pursue comfort care and hospice.    On the date of discharge, the patient reported feeling stable. The patient was found to not be in any acute distress, with vital signs within normal limits, and no new abnormalities on physical examination. Further, the patient  a paper prescription for each of these medications  LORazepam 2 MG/ML injection  morphine (PF) 2 MG/ML Soln injection       Activity: activity as tolerated  Diet: regular diet  Wound Care: none needed    Time Spent on discharge is more than 30 minutes    Signed:  Mary Wasserman,  MS4  2/29/2024

## 2024-02-29 NOTE — PROGRESS NOTES
ONCOLOGY HEMATOLOGY CARE PROGRESS NOTE      SUBJECTIVE:Lying in bed wearing NC oxygen. No complaints this AM.      ROS:  Constitutional:  No weight loss, No fever, No chills, No night sweats.  Energy level fair.  Eyes:  No impairment or change in vision  ENT / Mouth:  No pain, abnormal ulceration, bleeding, nasal drip or change in voice or hearing  Cardiovascular:  No chest pain, palpitations, new edema, or calf discomfort  Respiratory:  No pain, hemoptysis, change to breathing  Gastrointestinal:  No pain, cramping, jaundice, change to eating and bowel habits  Urinary:  No pain, bleeding or change in continence  Musculoskeletal:  No redness, pain, edema or weakness  Skin:  No pruritus, rash, change to nodules or lesions  Neurologic:  No discomfort, change in mental status, speech, sensory or motor activity  Psychiatric:  No change in concentration or change to affect or mood  Endocrine:  No hot flashes, increased thirst, or change to urine production  Hematologic: No petechiae, ecchymosis or bleeding  Lymphatic:  No lymphadenopathy or lymphedema  Allergy / Immunologic:  No eczema, hives, frequent or recurrent infections    OBJECTIVE      HISTORY OF PRESENT ILLNESS:     Ms. Minor Valadez  is a 68 y.o. 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 Unfortunately, due to hospitalizations, she has not yet been started on treatment. Was due to have Gamma Knife on 2/20 however this was rescheduled due to rehab admission. Last week her Caris molecular profiling revealed an MTAP and ROS1 mutation and we prescribed Entrectinib with the intention to start ASAP. She has not yet received this medication.      She was admitted 2/14/24 with weakness and failure to thrive. Goals of care conversations were had at that time. Ultimately, she wanted to pursue treatment but needed rehab prior. She presents from rehab with increased SOB and cough. She tells me that  performance status improves. Otherwise, she would likely be a poor systemic treatment candidate      Appreciate Palliative Care's assistance with goals of care discussions.     ONCOLOGIC DISPOSITION:  TBD    Adrian Ricks MD  Please contact through [employer]

## 2024-03-01 VITALS
TEMPERATURE: 98.5 F | RESPIRATION RATE: 16 BRPM | OXYGEN SATURATION: 96 % | WEIGHT: 105.16 LBS | DIASTOLIC BLOOD PRESSURE: 81 MMHG | SYSTOLIC BLOOD PRESSURE: 134 MMHG | HEIGHT: 63 IN | BODY MASS INDEX: 18.63 KG/M2 | HEART RATE: 82 BPM

## 2024-03-01 LAB
ANION GAP SERPL CALCULATED.3IONS-SCNC: 9 MMOL/L (ref 3–16)
BASOPHILS # BLD: 0 K/UL (ref 0–0.2)
BASOPHILS NFR BLD: 0.1 %
BUN SERPL-MCNC: 15 MG/DL (ref 7–20)
CALCIUM SERPL-MCNC: 8.8 MG/DL (ref 8.3–10.6)
CHLORIDE SERPL-SCNC: 102 MMOL/L (ref 99–110)
CO2 SERPL-SCNC: 26 MMOL/L (ref 21–32)
CREAT SERPL-MCNC: <0.5 MG/DL (ref 0.6–1.2)
DEPRECATED RDW RBC AUTO: 17.5 % (ref 12.4–15.4)
EOSINOPHIL # BLD: 0 K/UL (ref 0–0.6)
EOSINOPHIL NFR BLD: 0.1 %
GFR SERPLBLD CREATININE-BSD FMLA CKD-EPI: >60 ML/MIN/{1.73_M2}
GLUCOSE SERPL-MCNC: 165 MG/DL (ref 70–99)
HCT VFR BLD AUTO: 34.7 % (ref 36–48)
HGB BLD-MCNC: 11 G/DL (ref 12–16)
LYMPHOCYTES # BLD: 0.8 K/UL (ref 1–5.1)
LYMPHOCYTES NFR BLD: 2.4 %
MAGNESIUM SERPL-MCNC: 1.7 MG/DL (ref 1.8–2.4)
MCH RBC QN AUTO: 28.7 PG (ref 26–34)
MCHC RBC AUTO-ENTMCNC: 31.8 G/DL (ref 31–36)
MCV RBC AUTO: 90.1 FL (ref 80–100)
MONOCYTES # BLD: 1.1 K/UL (ref 0–1.3)
MONOCYTES NFR BLD: 3.2 %
NEUTROPHILS # BLD: 33.4 K/UL (ref 1.7–7.7)
NEUTROPHILS NFR BLD: 94.2 %
PLATELET # BLD AUTO: 241 K/UL (ref 135–450)
PMV BLD AUTO: 7.3 FL (ref 5–10.5)
POTASSIUM SERPL-SCNC: 4.6 MMOL/L (ref 3.5–5.1)
RBC # BLD AUTO: 3.85 M/UL (ref 4–5.2)
SODIUM SERPL-SCNC: 137 MMOL/L (ref 136–145)
WBC # BLD AUTO: 35.4 K/UL (ref 4–11)

## 2024-03-01 PROCEDURE — 80048 BASIC METABOLIC PNL TOTAL CA: CPT

## 2024-03-01 PROCEDURE — 6370000000 HC RX 637 (ALT 250 FOR IP)

## 2024-03-01 PROCEDURE — 85025 COMPLETE CBC W/AUTO DIFF WBC: CPT

## 2024-03-01 PROCEDURE — 6360000002 HC RX W HCPCS

## 2024-03-01 PROCEDURE — 83735 ASSAY OF MAGNESIUM: CPT

## 2024-03-01 PROCEDURE — 2580000003 HC RX 258

## 2024-03-01 PROCEDURE — 36415 COLL VENOUS BLD VENIPUNCTURE: CPT

## 2024-03-01 RX ADMIN — ENOXAPARIN SODIUM 30 MG: 100 INJECTION SUBCUTANEOUS at 10:20

## 2024-03-01 RX ADMIN — GABAPENTIN 100 MG: 100 CAPSULE ORAL at 15:15

## 2024-03-01 RX ADMIN — GABAPENTIN 100 MG: 100 CAPSULE ORAL at 10:19

## 2024-03-01 RX ADMIN — SODIUM CHLORIDE, PRESERVATIVE FREE 10 ML: 5 INJECTION INTRAVENOUS at 11:26

## 2024-03-01 RX ADMIN — ONDANSETRON 4 MG: 2 INJECTION INTRAMUSCULAR; INTRAVENOUS at 06:13

## 2024-03-01 RX ADMIN — DEXAMETHASONE 4 MG: 4 TABLET ORAL at 10:19

## 2024-03-01 RX ADMIN — PANTOPRAZOLE SODIUM 40 MG: 40 TABLET, DELAYED RELEASE ORAL at 05:57

## 2024-03-01 RX ADMIN — LOSARTAN POTASSIUM 50 MG: 50 TABLET, FILM COATED ORAL at 10:19

## 2024-03-01 NOTE — PLAN OF CARE
Problem: Skin/Tissue Integrity  Goal: Absence of new skin breakdown  Description: 1.  Monitor for areas of redness and/or skin breakdown  2.  Assess vascular access sites hourly  3.  Every 4-6 hours minimum:  Change oxygen saturation probe site  4.  Every 4-6 hours:  If on nasal continuous positive airway pressure, respiratory therapy assess nares and determine need for appliance change or resting period.  3/1/2024 1632 by Aissatou Horne RN  Outcome: Progressing  Note: Monitor skin breakdown/redness. Repositioned frequently.      Problem: Safety - Adult  Goal: Free from fall injury  Outcome: Progressing  Note: Pt remains free of falls. Call light is within reach, bed at lowest position, and bed alarm is on.      Problem: Discharge Planning  Goal: Discharge to home or other facility with appropriate resources  Outcome: Progressing

## 2024-03-01 NOTE — PLAN OF CARE
No new skin issues noted. Patient remains free from falls and injury. Complaint of headache at beginning of shift controlled with PRN tylenol.  Problem: Skin/Tissue Integrity  Goal: Absence of new skin breakdown  Description: 1.  Monitor for areas of redness and/or skin breakdown  2.  Assess vascular access sites hourly  3.  Every 4-6 hours minimum:  Change oxygen saturation probe site  4.  Every 4-6 hours:  If on nasal continuous positive airway pressure, respiratory therapy assess nares and determine need for appliance change or resting period.  3/1/2024 0132 by Mily Hernadez, RN  Outcome: Progressing     Problem: Safety - Adult  Goal: Free from fall injury  3/1/2024 0132 by Mily Hernadez, RN  Outcome: Progressing     Problem: Pain  Goal: Verbalizes/displays adequate comfort level or baseline comfort level  Outcome: Progressing

## 2024-03-01 NOTE — PROGRESS NOTES
Progress Note    Admit Date: 2/27/2024  Day: 4  Diet: ADULT DIET; Regular  ADULT ORAL NUTRITION SUPPLEMENT; Breakfast, Lunch, Dinner; Standard High Calorie/High Protein Oral Supplement    CC:  Shortness of Breath     Interval history:   No acute events overnight. No changes per DC summary. Patient medically ready for discharge, awaiting hospice disposition.    HPI:   Nabor Valadez is a 68 y.o. 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 Pt does not use oxygen at rehab facility at baseline. She has not noticed any increased fatigue, sweating, fever, chills, N/V/D, dysuria, skin irritation or other signs of infection. She denies recent sick contacts.      Patient was diagnosed with metastatic lung cancer at [facility] 01/2024.  Most recent admission 2/14/24 for failure to thrive and generalized weakness. Was scheduled to have Gamma knife surgery on 02/20/24 but surgery did not go through. Appears that patient required more rehab and was not deemed a good candidate for surgery.    Patient was admitted for further workup and management for new increased O2 requirement.    Medications:     Scheduled Meds:   ascorbic acid  500 mg Oral Daily    oyster shell calcium w/D  2 tablet Oral Daily    dexAMETHasone  4 mg Oral 2 times per day    fluticasone  1 spray Each Nostril Daily    gabapentin  100 mg Oral TID    cetirizine  10 mg Oral Daily    losartan  50 mg Oral Daily    CENTRUM/CERTA-OMER with minerals oral  15 mL Oral Daily    pantoprazole  40 mg Oral QAM AC    sodium chloride flush  5-40 mL IntraVENous 2 times per day    enoxaparin  30 mg SubCUTAneous Daily    Carbidopa-Levodopa ER  1 capsule Oral 6x Daily    neomycin-bacitracin-polymyxin   Topical BID    sodium chloride  500 mL IntraVENous Once     Continuous Infusions:   sodium chloride      lactated ringers IV  soln 125 mL/hr at 02/29/24 1727     PRN Meds:morphine, sodium chloride flush, sodium chloride, ondansetron **OR** ondansetron, polyethylene glycol, acetaminophen **OR** acetaminophen, ipratropium 0.5 mg-albuterol 2.5 mg    Objective:   Vitals:   T-max:  Patient Vitals for the past 8 hrs:   BP Temp Temp src Pulse Resp SpO2 Weight   03/01/24 1019 133/83 -- -- -- -- -- --   03/01/24 0940 135/86 98.1 °F (36.7 °C) Oral 85 17 97 % --   03/01/24 0600 -- -- -- -- -- -- 47.7 kg (105 lb 2.6 oz)   03/01/24 0401 (!) 140/92 98.1 °F (36.7 °C) Oral 77 16 97 % --       No intake or output data in the 24 hours ending 03/01/24 1131    Review of Systems   All other systems reviewed and are negative.      Physical Exam  Constitutional:       Appearance: She is not ill-appearing.      Comments: cachetic   HENT:      Head: Normocephalic and atraumatic.      Nose: Nose normal.      Mouth/Throat:      Mouth: Mucous membranes are moist.      Pharynx: Oropharynx is clear.   Eyes:      Pupils: Pupils are equal, round, and reactive to light.   Cardiovascular:      Rate and Rhythm: Normal rate and regular rhythm.      Pulses: Normal pulses.      Heart sounds: Normal heart sounds.   Pulmonary:      Breath sounds: Normal breath sounds.      Comments: 2 L NC  Abdominal:      Palpations: Abdomen is soft.   Skin:     General: Skin is warm and dry.   Neurological:      General: No focal deficit present.      Mental Status: She is alert and oriented to person, place, and time.      Motor: Weakness present.   Psychiatric:         Behavior: Behavior normal.         LABS:    CBC:   Recent Labs     02/28/24  1242 02/29/24  0828 03/01/24  0924   WBC 29.3* 34.8* 35.4*   HGB 12.0 11.3* 11.0*   HCT 36.3 35.7* 34.7*    211 241   MCV 87.5 91.1 90.1       Renal:    Recent Labs     02/28/24  1242 02/29/24  0828 03/01/24  0923   * 132* 137   K 4.5 4.7 4.6   CL 99 99 102   CO2 25 22 26   BUN 15 17 15   CREATININE <0.5* <0.5* <0.5*   GLUCOSE 111* 118*

## 2024-03-01 NOTE — PROGRESS NOTES
Palliative Care Chart Review  and Check in Note:     NAME:  Nabor Valadez  Admit Date: 2/27/2024  Hospital Day:  Hospital Day: 4   Current Code status: DNR-CC    Palliative care is continuing to following Ms. Minor Valadez for symptom management,  and goals of care discussion as needed. Patient's chart reviewed today 3/1/24.      D/w SHANI Murdock with oncology and [facility] hospice RN. Pt has elected to enroll with hospice. Plan to discharge to hospice IPU      The following are the currently established goals/code status, and Symptom management.     Goals of care: Goals are comfort directed    Code status: DNRCC - signed portable [place] DNR form and placed on chart    Discharge plan: [facility] hospice IPU      SHANI Hernandez - CNP  03/01/24  2:27 PM

## 2024-03-01 NOTE — PROGRESS NOTES
ONCOLOGY HEMATOLOGY CARE PROGRESS NOTE      SUBJECTIVE:    She tells me that she remains hopeful she could be treated. Working very hard to breathe today with heavy use of accessory muscles. She has a cough.     ROS:     Constitutional:  No weight loss, No fever, No chills, No night sweats.  Energy level good.  Eyes:  No impairment or change in vision  ENT / Mouth:  No pain, abnormal ulceration, bleeding, nasal drip or change in voice or hearing  Cardiovascular:  No chest pain, palpitations, new edema, or calf discomfort  Respiratory:  No pain, hemoptysis, +change to breathing  Breast:  No pain, discharge, change in appearance or texture  Gastrointestinal:  No pain, cramping, jaundice, change to eating and bowel habits  Urinary:  No pain, bleeding or change in continence  Genitalia: No pain, bleeding or discharge  Musculoskeletal:  No redness, pain, edema or weakness  Skin:  No pruritus, rash, change to nodules or lesions  Neurologic:  No discomfort, change in mental status, speech, sensory or motor activity  Psychiatric:  No change in concentration or change to affect or mood  Endocrine:  No hot flashes, increased thirst, or change to urine production  Hematologic: No petechiae, ecchymosis or bleeding  Lymphatic:  No lymphadenopathy or lymphedema  Allergy / Immunologic:  No eczema, hives, frequent or recurrent infections    OBJECTIVE        Physical    VITALS:  Patient Vitals for the past 24 hrs:   BP Temp Temp src Pulse Resp SpO2 Weight   03/01/24 1242 (!) 145/87 98.1 °F (36.7 °C) Oral 89 16 95 % --   03/01/24 1019 133/83 -- -- -- -- -- --   03/01/24 0940 135/86 98.1 °F (36.7 °C) Oral 85 17 97 % --   03/01/24 0600 -- -- -- -- -- -- 47.7 kg (105 lb 2.6 oz)   03/01/24 0401 (!) 140/92 98.1 °F (36.7 °C) Oral 77 16 97 % --   03/01/24 0008 (!) 156/64 98.2 °F (36.8 °C) Oral 91 17 94 % --   02/29/24 1950 (!) 143/84 98 °F (36.7 °C) Oral 79 17 98 % --   02/29/24 1800 119/72 97.5 °F  of mastoid effusion.  Bones: No evidence of fracture or osseous destructive lesion..  Impression: Left occipital mass similar to prior MRI is some vasogenic edema.  No acute intracranial hemorrhage.    Electronically signed by Maggie Pearson  XR CHEST PORTABLE  Narrative: Examination: Chest x-ray 1 view    Exam time: 2/27/2024 21:17 EST    Clinical history: shortness of breath .    Comparison: 14 February 2024    Technique: Upright AP view of the chest demonstrated.    Findings:    2 numerous to count multiple pulmonary parenchymal nodules are seen throughout both lungs consistent with metastatic disease correlate clinically findings unchanged from previous exam.  There is cardiomegaly without vascular congestion.  There may be more focal consolidation at the left lung base either a mass or infiltrate. The left diaphragm is not visualized.  Senescent changes dorsal spine and cardiovascular structures  Impression: Findings similar to previous multiple large pulmonary nodules consistent with metastatic disease.  Left diaphragm not visualized possible left basilar infiltrate.    Electronically signed by Maggie Pearson      Problem List  Patient Active Problem List   Diagnosis    Mild intermittent asthma without complication    Age-related osteoporosis without current pathological fracture    Parkinson disease (HCC) - diagnosed age 60    Seasonal allergies    History of recurrent UTIs    Family history of colon cancer in mother    Bradycardia    Abnormal EKG    S/P total knee replacement, left    Acute cough    Dyspnea    Primary cancer of left lower lobe of lung (HCC)    Mediastinal lymphadenopathy    Malignant neoplasm metastatic to both lungs (HCC)    Failure to thrive in adult    Severe malnutrition (HCC)    Metastasis to brain (HCC)    Hypoxia       ASSESSMENT AND PLAN:    Squamous Cell Lung Cancer; T3N2M1; ROS1 fusion; MTAP deletion; PDL1 positive  - PET/CT demonstrates the left lower lobe primary tumor with

## 2024-03-01 NOTE — PROGRESS NOTES
Pt tele removed, and IV was not removed, stayed in place. Pt belongings were packed and sent with them. Pt discharged to [facility].

## 2024-03-01 NOTE — CARE COORDINATION
Case Management Assessment            Discharge Note                    Date / Time of Note: 2/29/2024 12:07 PM                  Discharge Note Completed by: Ashley Valderrama RN    Patient Name: Nabor Valadez   YOB: 1955  Diagnosis: Hypoxia [R09.02]  Acute respiratory failure with hypoxia (HCC) [J96.01]   Date / Time: 2/27/2024  8:41 PM    Current PCP: Edenilson Munroe,   Clinic patient: No    Hospitalization in the last 30 days: Yes  Readmission Assessment  Number of Days since last admission?: 8-30 days  Previous Disposition: Acute Rehab  Who is being Interviewed: Patient, Caregiver  What was the patient's/caregiver's perception as to why they think they needed to return back to the hospital?: Other (Comment) (SOB at rehab over night sent to ED, lung cancer)  Did you visit your Primary Care Physician after you left the hospital, before you returned this time?: No  Why weren't you able to visit your PCP?: Did not have an appointment (at [facility])  Did you see a specialist, such as Cardiac, Pulmonary, Orthopedic Physician, etc. after you left the hospital?: No (at [facility])  Who advised the patient to return to the hospital?: Acute Rehab  Does the patient report anything that got in the way of taking their medications?: No  In our efforts to provide the best possible care to you and others like you, can you think of anything that we could have done to help you after you left the hospital the first time, so that you might not have needed to return so soon?: Other (Comment) (none)    Advance Directives:  Code Status: DNR-CC  [place] DNR form completed and on chart: Yes    Financial:  Payor: [facility] MEDICARE / Plan: [facility] MEDICARE ADVANTAGE / Product Type: *No Product type* /      Pharmacy:    [id number] - [place], [place] - [address] [phone] - F [phone]  [address] [facility] [place] [address]  Phone: [phone] Fax: [phone]      Assistance 
CM  following for  d/c planning:    PC  consulted:  Goals of  Care  Dietitian: consulted:  Poor Intake/Appetite 5 or more days     Per  MD , Hem/Onc: consulted: she is not likely to be able to tolerate systemic therapy at this time and would be hospice appropriate.     CM  will assist  with  referral to  Hospice  of  choice  if  agreeable.      Electronically signed by Ashley Valderrama RN on 2/29/2024 at 9:28 AM         Ashley Valderrama RN Case Manager  [facility] [address]  [facility] [address] [phone]  Fax [phone]     
JESUS  met with  Grace RUSSELL  who followed  up with pt  who  states she  has  decided to  d/c to [facility] today  >      Grace to make  transport arrangements. And  Inform  bedside  YVONNE Barnes  of  Report  #  .      Grace to execute d/c plan .     Electronically signed by Ashley Valderrama RN on 3/1/2024 at 2:05 PM     +++++++++++++++++++++++++++++++++++++++++++++++++++++++++      CM  following for  d/c planning:    JESUS  met with patient  at  bedside to follow up from [facility] meeting  with  Kayla  [phone].     Patient  informed  she has a d/c in order  in  and needs to choose her  choice/preference  of  Disposition  Home  vs  [facility]  :  She  is still deciding  and  will let  us  know, all questions and concerns  addressed.     CM  spoke with  Kayla  to update her  on status  and that pt has  d/c  order:  and she  will follow up with the  patient as  well.      [facility]  [facility]  [address] 5th floor  [facility] [address]  [phone]      Electronically signed by Ashley Valderrama RN on 3/1/2024 at 12:00 PM       Ashley Valderrama RN Case Manager  [facility] [id number]  [facility] [address]  [phone]  Fax [phone]   
(none)    Advance Directives:      Code Status: Limited   Patient's Primary Decision Maker is: Legal Next of Kin    Primary Decision Maker: Marito Valadez C - Spouse - [phone]    Discharge Planning:    Patient lives with: Spouse/Significant Other Type of Home: House  Primary Care Giver: Self  Patient Support Systems include: Spouse/Significant Other, Family Members   Current Financial resources: Medicare  Current community resources:    Current services prior to admission: Durable Medical Equipment, Transitional Care            Current DME: Shower Chair            Type of Home Care services:  None    ADLS  Prior functional level: Independent in ADLs/IADLs  Current functional level: Independent in ADLs/IADLs    PT AM-PAC: 17 /24  OT AM-PAC: 17 /24    Family can provide assistance at DC: Yes  Would you like Case Management to discuss the discharge plan with any other family members/significant others, and if so, who? Yes ()  Plans to Return to Present Housing: Yes  Other Identified Issues/Barriers to RETURNING to current housing: med stability/ cancer treatment   Potential Assistance needed at discharge: Transitional Care, Home Care            Potential DME:    Patient expects to discharge to: Unknown  Plan for transportation at discharge: Family    Financial    Payor: [facility] MEDICARE / Plan: [facility] MEDICARE ADVANTAGE / Product Type: *No Product type* /     Does insurance require precert for SNF: Yes    Potential assistance Purchasing Medications: No  Meds-to-Beds request:        [id number] - [place], [place] - [address] - P [phone] - F [phone]  [address]  [facility] [address]  Phone: [phone] Fax: [phone]      Notes:    Factors facilitating achievement of predicted outcomes: Family support, Motivated, Cooperative, Pleasant, Sense of humor, Good insight into deficits, Has needed Durable Medical Equipment at home, and Knowledge about rehab    Barriers to

## 2024-03-03 LAB — BACTERIA BLD CULT ORG #2: NORMAL

## 2024-03-05 ENCOUNTER — HOSPITAL ENCOUNTER (OUTPATIENT)
Dept: RADIATION ONCOLOGY | Age: 69
Discharge: HOME | End: 2024-03-05

## (undated) DEVICE — FORCEPS BX L100CM DIA1.8MM WRK CHN 2MM PULM S STL RAD JAW 4

## (undated) DEVICE — Z DISCONTINUED USE 2749457 TUBING SAMP AD W12.5XH8.4IN D9.1IN NSL ORAL SMRT CAPNOLINE

## (undated) DEVICE — NEEDLE ASPIR 700X2 MM DISP